# Patient Record
Sex: MALE | Race: WHITE | NOT HISPANIC OR LATINO | Employment: OTHER | URBAN - METROPOLITAN AREA
[De-identification: names, ages, dates, MRNs, and addresses within clinical notes are randomized per-mention and may not be internally consistent; named-entity substitution may affect disease eponyms.]

---

## 2017-06-01 ENCOUNTER — HISTORICAL (OUTPATIENT)
Dept: ADMINISTRATIVE | Facility: HOSPITAL | Age: 57
End: 2017-06-01

## 2017-06-05 ENCOUNTER — TELEPHONE (OUTPATIENT)
Dept: UROLOGY | Facility: CLINIC | Age: 57
End: 2017-06-05

## 2017-06-05 DIAGNOSIS — R97.20 ELEVATED PSA, LESS THAN 10 NG/ML: ICD-10-CM

## 2017-06-05 DIAGNOSIS — R31.0 GROSS HEMATURIA: Primary | ICD-10-CM

## 2017-06-05 NOTE — TELEPHONE ENCOUNTER
Spoke with patient assisted with scheduling follow up appointments , date time and locations confirmed for all appointments. Informed appointment slips would be mailed out as an reminder. Verbalized understanding

## 2017-06-07 ENCOUNTER — CLINICAL SUPPORT (OUTPATIENT)
Dept: UROLOGY | Facility: CLINIC | Age: 57
End: 2017-06-07
Payer: MEDICARE

## 2017-06-07 ENCOUNTER — LAB VISIT (OUTPATIENT)
Dept: LAB | Facility: HOSPITAL | Age: 57
End: 2017-06-07
Attending: UROLOGY
Payer: MEDICARE

## 2017-06-07 DIAGNOSIS — R31.0 GROSS HEMATURIA: ICD-10-CM

## 2017-06-07 LAB
ANION GAP SERPL CALC-SCNC: 13 MMOL/L
BUN SERPL-MCNC: 10 MG/DL
CALCIUM SERPL-MCNC: 9.8 MG/DL
CHLORIDE SERPL-SCNC: 99 MMOL/L
CO2 SERPL-SCNC: 24 MMOL/L
CREAT SERPL-MCNC: 1 MG/DL
EST. GFR  (AFRICAN AMERICAN): >60 ML/MIN/1.73 M^2
EST. GFR  (NON AFRICAN AMERICAN): >60 ML/MIN/1.73 M^2
GLUCOSE SERPL-MCNC: 88 MG/DL
POTASSIUM SERPL-SCNC: 3.5 MMOL/L
SODIUM SERPL-SCNC: 136 MMOL/L

## 2017-06-07 PROCEDURE — 36415 COLL VENOUS BLD VENIPUNCTURE: CPT

## 2017-06-07 PROCEDURE — 80048 BASIC METABOLIC PNL TOTAL CA: CPT

## 2017-06-07 NOTE — PROGRESS NOTES
Pt arrived to clinic to have barker cath removed. 16FR barker cath with 10cc balloon intacked and d/c'd with no problem. Pt tolerated procedure well. Pt advised to drink plenty of fluid to help urinate. If by 2pm pt has not urinated pt instructed to come back to clinic to get another barker cath.

## 2017-06-08 ENCOUNTER — LAB VISIT (OUTPATIENT)
Dept: LAB | Facility: HOSPITAL | Age: 57
End: 2017-06-08
Attending: UROLOGY
Payer: MEDICARE

## 2017-06-08 ENCOUNTER — CLINICAL SUPPORT (OUTPATIENT)
Dept: UROLOGY | Facility: CLINIC | Age: 57
End: 2017-06-08
Payer: MEDICARE

## 2017-06-08 ENCOUNTER — TELEPHONE (OUTPATIENT)
Dept: UROLOGY | Facility: CLINIC | Age: 57
End: 2017-06-08

## 2017-06-08 DIAGNOSIS — R31.0 GROSS HEMATURIA: ICD-10-CM

## 2017-06-08 DIAGNOSIS — R97.20 ELEVATED PSA, LESS THAN 10 NG/ML: ICD-10-CM

## 2017-06-08 DIAGNOSIS — E29.1 HYPOGONADISM IN MALE: ICD-10-CM

## 2017-06-08 DIAGNOSIS — R97.20 ELEVATED PSA, LESS THAN 10 NG/ML: Primary | ICD-10-CM

## 2017-06-08 DIAGNOSIS — R31.0 GROSS HEMATURIA: Primary | ICD-10-CM

## 2017-06-08 DIAGNOSIS — N40.0 BENIGN PROSTATIC HYPERPLASIA, PRESENCE OF LOWER URINARY TRACT SYMPTOMS UNSPECIFIED, UNSPECIFIED MORPHOLOGY: ICD-10-CM

## 2017-06-08 LAB
PROSTATE SPECIFIC ANTIGEN, TOTAL: 9.6 NG/ML
PSA FREE MFR SERPL: 34.69 %
PSA FREE SERPL-MCNC: 3.33 NG/ML

## 2017-06-08 PROCEDURE — 51700 IRRIGATION OF BLADDER: CPT | Mod: S$PBB,,, | Performed by: UROLOGY

## 2017-06-08 PROCEDURE — 99212 OFFICE O/P EST SF 10 MIN: CPT | Mod: PBBFAC,PO

## 2017-06-08 PROCEDURE — 88112 CYTOPATH CELL ENHANCE TECH: CPT | Performed by: PATHOLOGY

## 2017-06-08 PROCEDURE — 88112 CYTOPATH CELL ENHANCE TECH: CPT | Mod: 26,,, | Performed by: PATHOLOGY

## 2017-06-08 PROCEDURE — 87086 URINE CULTURE/COLONY COUNT: CPT

## 2017-06-08 PROCEDURE — 84153 ASSAY OF PSA TOTAL: CPT

## 2017-06-08 PROCEDURE — 99999 PR PBB SHADOW E&M-EST. PATIENT-LVL II: CPT | Mod: PBBFAC,,,

## 2017-06-08 PROCEDURE — 99215 OFFICE O/P EST HI 40 MIN: CPT | Mod: S$PBB,25,, | Performed by: UROLOGY

## 2017-06-08 PROCEDURE — 51700 IRRIGATION OF BLADDER: CPT | Mod: PBBFAC,PO | Performed by: UROLOGY

## 2017-06-08 PROCEDURE — 36415 COLL VENOUS BLD VENIPUNCTURE: CPT

## 2017-06-08 PROCEDURE — 96372 THER/PROPH/DIAG INJ SC/IM: CPT | Mod: PBBFAC,PO

## 2017-06-08 RX ORDER — FINASTERIDE 5 MG/1
5 TABLET, FILM COATED ORAL DAILY
Qty: 30 TABLET | Refills: 11 | Status: SHIPPED | OUTPATIENT
Start: 2017-06-08 | End: 2018-01-24

## 2017-06-08 RX ORDER — LIDOCAINE HYDROCHLORIDE 20 MG/ML
JELLY TOPICAL
Status: CANCELLED | OUTPATIENT
Start: 2017-06-08 | End: 2017-06-08

## 2017-06-08 RX ORDER — LIDOCAINE HYDROCHLORIDE 20 MG/ML
JELLY TOPICAL ONCE
Status: CANCELLED | OUTPATIENT
Start: 2017-06-08 | End: 2017-06-08

## 2017-06-08 RX ORDER — TAMSULOSIN HYDROCHLORIDE 0.4 MG/1
0.4 CAPSULE ORAL DAILY
Qty: 30 CAPSULE | Refills: 11 | Status: SHIPPED | OUTPATIENT
Start: 2017-06-08 | End: 2017-09-06

## 2017-06-08 RX ORDER — GENTAMICIN SULFATE 40 MG/ML
160 INJECTION, SOLUTION INTRAMUSCULAR; INTRAVENOUS ONCE
Status: COMPLETED | OUTPATIENT
Start: 2017-06-08 | End: 2017-06-08

## 2017-06-08 RX ADMIN — GENTAMICIN SULFATE 160 MG: 40 INJECTION, SOLUTION INTRAMUSCULAR; INTRAVENOUS at 04:06

## 2017-06-08 NOTE — PROGRESS NOTES
Pt arrived to clinic. Pt sent to have PSA done at lab. Pt returned to clinic. Dr Mcconnell to put 20FR coude cath with 30cc balloon inserted. Dr Mcconnell to irrigate bladder continuously until urine went from dark, red blood to pink. Leg bag attached and taped to side of leg for gravity. Pt given more water to drink to check out put.

## 2017-06-08 NOTE — PROGRESS NOTES
"[facility] Urology Progress Note    Mo Escobar is a 57 y.o. male who presents for gross hematuria follow up.     I saw him as inpt consult at [facility] on 5/29/17 where he had just completed CBI for gross hematuria which has been a recurrent problem given his history of BPH and clotting disorder with DVTs/PEs on chronic anticoagulation.   Previously followed by Dr Humphrey since urinary retention episode in 2014 and had TURP with lovenox bridge at that time. Reports that ever since that TURP in 2014 he has had intermittent GH for a few days at a time.  Longest hematuria free interval in 3 years has been 2 mos. Path reportedly benign at that time.  PSA 10/2015 4.3, 4/2016 3.6. Has not had dedicated prostate biopsy.  4/2016 negative RBUS  5/2016 noncon CT normal except prostatomegaly ([id number]) with sig median lobe (larger med lobe than in 2010)  6/2016 scope by Dr Humphrey as part of GH workup only noted BPH regrowth and was started on finasteride for presumed prostatic bleeding (which he stopped 9/2016 noting a "funny feeling")    2 weeks of GH with clots prompted his [facility] admit late may. Once barker out, retention and needed replaced next day after which I saw him. Recommended restarting flomax (had been off since pre-TURP) and had nurse visit yesterday for barker removal.  Discussed restarting 5ari but only after free total psa after his barker had been out a few week for assessment of his age specific elevated psa. Also recommended fresh GH workup with CT urogram and cystoscopy. With further interventions based on assessment of psa and bladder/urethral findings.   He was found to have nonocclusive R popliteal DVT during his [facility] admit    Resumed anticoagulation (warfarin with lovenox bridge) yesterday. Had recurrence of GH and called today noting frankly bloody urine and has passed some clots.  I advised he come in to clinic for catheter placement and bladder irrigation, and expedite timeline of evaluation.  Does " state GH started to resume last night even before starting anticoagulation. Had otherwise been clear with barker in place.    He also mentioned today he has been on TRT for 6 months, Q2 weeks, with last injection <1 week ago as he was found to have low T on fatigue workup earlier this year.    ROS: A comprehensive 10 system review was performed and is negative except as noted above in HPI    PHYSICAL EXAM:  General: Alert, cooperative, no distress, appears stated age   Head: Normocephalic, without obvious abnormality, atraumatic   Eyes: PERRL, conjunctiva/corneas clear   Lungs: Respirations unlabored   Heart: Warm and well perfused   Abdomen: soft NT ND  : uncirc normal phallus, normal meatus, bilat desc testes without mass.  Extremities: Extremities normal, atraumatic, no cyanosis or edema   Skin: Skin color, texture, turgor normal, no rashes or lesions   Psych: Appropriate   Neurologic: Non-focal     Catheterization/Bladder irrigation  Given his persistent hematuria with clots, proceeded with irrigation of his bladder after verbal consent.  Using aseptic technique, a 20fr coude barker catheter placed into bladder. 350cc grossly bloody urine drained, no clots but had appearance of raghu blood.  60 cc cath tip syringe filled with sterile water used to irrigate bladder. 60cc instilled, then next 60cc used to flush and irrigate until return of clear to light pink urine seen after approximately 1.5 liters of manual irrigation, during which the 30cc balloon was overdistended with 40cc and catheter held on traction while irrigating to tamponade presumed prostatic bleeding, after which urine cleared. Barker taped to traction on thigh, irrigated clear again, and left on traction for 1 hour after which 10cc removed from balloon and untaped from traction and placed to stat lock and leg bag at which time urine translucent/clear light red. No clots.  Patient tolerated well. He was then given 160mg IM gentamicin. Urine sent for  culture and cytology.        Recent Results (from the past 336 hour(s))   Basic metabolic panel    Collection Time: 06/07/17  8:51 AM   Result Value Ref Range    Sodium 136 136 - 145 mmol/L    Potassium 3.5 3.5 - 5.1 mmol/L    Chloride 99 95 - 110 mmol/L    CO2 24 23 - 29 mmol/L    Glucose 88 70 - 110 mg/dL    BUN, Bld 10 6 - 20 mg/dL    Creatinine 1.0 0.5 - 1.4 mg/dL    Calcium 9.8 8.7 - 10.5 mg/dL    Anion Gap 13 8 - 16 mmol/L    eGFR if African American >60 >60 mL/min/1.73 m^2    eGFR if non African American >60 >60 mL/min/1.73 m^2       ASSESSMENT   1. Gross hematuria  Urine culture    Cytology, urine    finasteride (PROSCAR) 5 mg tablet   2. Elevated PSA, less than 10 ng/ml     3. Benign prostatic hyperplasia, presence of lower urinary tract symptoms unspecified, unspecified morphology  tamsulosin (FLOMAX) 0.4 mg Cp24    finasteride (PROSCAR) 5 mg tablet   4. Hypogonadism in male         Plan    Recommended barker remaining indwelling for prostatic urethral tamponade effect until cystoscopic evaluation, and resuming finasteride to decrease prostatic bleeding. I had him draw his free/total psa prior to catheterization today and prior to finasteride effect. Though may not be accurate given recent barker, was out for > 24 hours and is best window will have to reeval.  His CT urogram has been r/s for tomorrow and cystoscopy scheduled at [facility] 6/13/17.  Did discuss that if urine clear would proceed with diagnostic cysto for evaluation and definitive planning. Have tentatively planned for OR 6/22/17 as that is next prostate laser availability as we discussed if his friable prostate regrowth is indeed source of his hematuria would need TURP and would prefer laser vaporization for hemostasis. If other findings contributory to his hematuria such as bladder source could be managed at that time as well. For now advised he continue his current anticoagulation plan. Will not need to stop bloodthinners for diagnostic flexible  cysto, so will have time to discuss with Maribel Garrido/Swapna prior to any intervention 6/22/17, but advised will most likely require lovenox. Will chart check CT results and check in on Monday regarding status of urine.  Advised if urine becomes frankly bloody again, significant clots, or barker has poor drainage should call back if business hours otherwise present to ER.  Advised he hold off on any TRT pending eval of psa/prostate. Discussed that TRT does not cause prostate cancer, but if was too develop could exacerbate.  Given his BPH and recent retention should continue flomax. If psa elevated or free psa low would consider prostate biopsy but he is high risk given anticoag and hematuria and may need to proceed with turp without biopsy given his significant refractory hematuria if indeed prostate is source  45 mins spent in face to face encounter with patient, over half in counseling, and otherwise in extended manual irrigation of bladder

## 2017-06-08 NOTE — TELEPHONE ENCOUNTER
Pt called to say still bleeding and every once in a while feels a clot pass. Pt just wanted Dr Mcconnell to know. He is drinking plenty of water and is urinating fine. Pt having CT can and Cystoscope next week. Pt can come in to have another barker cath put in until procedure next week.   Pt would prefer not to have cath put back in. Dr Mcconnell stated with his hx he needs the cath. Pt will come in today.

## 2017-06-09 ENCOUNTER — TELEPHONE (OUTPATIENT)
Dept: UROLOGY | Facility: CLINIC | Age: 57
End: 2017-06-09

## 2017-06-09 ENCOUNTER — HOSPITAL ENCOUNTER (OUTPATIENT)
Dept: RADIOLOGY | Facility: HOSPITAL | Age: 57
Discharge: HOME | End: 2017-06-09
Attending: UROLOGY
Payer: MEDICARE

## 2017-06-09 DIAGNOSIS — D68.9 COAGULOPATHY: ICD-10-CM

## 2017-06-09 DIAGNOSIS — D68.9 CLOTTING DISORDER: Primary | ICD-10-CM

## 2017-06-09 DIAGNOSIS — N40.0 BPH (BENIGN PROSTATIC HYPERPLASIA): ICD-10-CM

## 2017-06-09 DIAGNOSIS — N40.0 BENIGN PROSTATIC HYPERPLASIA, PRESENCE OF LOWER URINARY TRACT SYMPTOMS UNSPECIFIED, UNSPECIFIED MORPHOLOGY: Primary | ICD-10-CM

## 2017-06-09 DIAGNOSIS — R31.0 GROSS HEMATURIA: ICD-10-CM

## 2017-06-09 LAB — BACTERIA UR CULT: NO GROWTH

## 2017-06-09 PROCEDURE — 25500020 PHARM REV CODE 255

## 2017-06-09 PROCEDURE — 74178 CT ABD&PLV WO CNTR FLWD CNTR: CPT | Mod: TC

## 2017-06-09 PROCEDURE — 74178 CT ABD&PLV WO CNTR FLWD CNTR: CPT | Mod: 26,,, | Performed by: RADIOLOGY

## 2017-06-09 RX ORDER — ENOXAPARIN SODIUM 100 MG/ML
1 INJECTION SUBCUTANEOUS 2 TIMES DAILY
Qty: 20 SYRINGE | Refills: 1 | Status: SHIPPED | OUTPATIENT
Start: 2017-06-09 | End: 2017-07-31

## 2017-06-09 RX ORDER — CIPROFLOXACIN 500 MG/1
500 TABLET ORAL 2 TIMES DAILY
Qty: 6 TABLET | Refills: 0 | Status: SHIPPED | OUTPATIENT
Start: 2017-06-09 | End: 2017-06-21 | Stop reason: ALTCHOICE

## 2017-06-09 RX ORDER — SODIUM CHLORIDE 9 MG/ML
INJECTION, SOLUTION INTRAVENOUS CONTINUOUS
Status: CANCELLED | OUTPATIENT
Start: 2017-06-09

## 2017-06-09 RX ADMIN — IOHEXOL 100 ML: 350 INJECTION, SOLUTION INTRAVENOUS at 07:06

## 2017-06-09 NOTE — TELEPHONE ENCOUNTER
Spoke with Pt. Pt stated he had a clog again but was able to pinch tube and push air back through to get unclogged. Pt doing fine now. Pt was seen in the ER at 5am this morning for clog.

## 2017-06-09 NOTE — TELEPHONE ENCOUNTER
Spoke with patient.   Has had a few times catheter stopped flowing since ER visit this am but has been able to manipulate it to drain again. Has had sediment, like sand, drain through.  Reviewed CT urogram with him - no stones, otherwise normal except for large prostate.  States his urine is now draining clear.

## 2017-06-09 NOTE — TELEPHONE ENCOUNTER
Call rtn to pt notified that we will have him placed on a Lovenox bridge that will start 5 days prior to his procedure I will review his bridge with him next week and submit a script for Lovenox to his pharmacy. Pt verbalized understanding has been on bridge for procedures in the past. appreciates call.

## 2017-06-09 NOTE — TELEPHONE ENCOUNTER
----- Message from Radha Alejo sent at 6/9/2017  2:46 PM CDT -----  Contact: Patient  Place call to pod, patient called  Regarding catheter clog,need to know what to do? Please call back at [phone]. Thanks,

## 2017-06-09 NOTE — TELEPHONE ENCOUNTER
----- Message from Dion Garrido MD sent at 6/8/2017  8:38 PM CDT -----  He is going to have urological procedure on 6/22 and may just need to stay on lovenox until that time; or he can go back on coumadin and start lovenox bridge 5 days before procedure. I think just staying on lovenox is the better of the two options      ----- Message -----  From: iMke Mcconnell MD  Sent: 6/8/2017   4:40 PM  To: Dion Garrido MD

## 2017-06-12 ENCOUNTER — TELEPHONE (OUTPATIENT)
Dept: HEMATOLOGY/ONCOLOGY | Facility: CLINIC | Age: 57
End: 2017-06-12

## 2017-06-13 ENCOUNTER — HOSPITAL ENCOUNTER (OUTPATIENT)
Facility: AMBULARY SURGERY CENTER | Age: 57
Discharge: HOME | End: 2017-06-13
Attending: UROLOGY | Admitting: UROLOGY
Payer: MEDICARE

## 2017-06-13 ENCOUNTER — SURGERY (OUTPATIENT)
Age: 57
End: 2017-06-13

## 2017-06-13 DIAGNOSIS — R31.0 GROSS HEMATURIA: ICD-10-CM

## 2017-06-13 PROCEDURE — G8907 PT DOC NO EVENTS ON DISCHARG: HCPCS | Performed by: UROLOGY

## 2017-06-13 PROCEDURE — G8918 PT W/O PREOP ORDER IV AB PRO: HCPCS | Performed by: UROLOGY

## 2017-06-13 PROCEDURE — 76942 ECHO GUIDE FOR BIOPSY: CPT | Mod: 26,59,, | Performed by: UROLOGY

## 2017-06-13 PROCEDURE — 51700 IRRIGATION OF BLADDER: CPT | Mod: 59,,, | Performed by: UROLOGY

## 2017-06-13 PROCEDURE — 76872 US TRANSRECTAL: CPT | Mod: 26,,, | Performed by: UROLOGY

## 2017-06-13 PROCEDURE — 52000 CYSTOURETHROSCOPY: CPT | Performed by: UROLOGY

## 2017-06-13 PROCEDURE — 52000 CYSTOURETHROSCOPY: CPT | Mod: 59,,, | Performed by: UROLOGY

## 2017-06-13 PROCEDURE — 52005 CYSTO W/URTRL CATHJ: CPT | Performed by: UROLOGY

## 2017-06-13 PROCEDURE — 88305 TISSUE EXAM BY PATHOLOGIST: CPT | Performed by: PATHOLOGY

## 2017-06-13 PROCEDURE — 88305 TISSUE EXAM BY PATHOLOGIST: CPT | Mod: 26,,, | Performed by: PATHOLOGY

## 2017-06-13 PROCEDURE — 76872 US TRANSRECTAL: CPT | Performed by: UROLOGY

## 2017-06-13 PROCEDURE — 55700 HC PROSTATE NEEDLE BIOPSY: CPT | Performed by: UROLOGY

## 2017-06-13 PROCEDURE — 55700 PR BIOPSY OF PROSTATE,NEEDLE/PUNCH: CPT | Mod: ,,, | Performed by: UROLOGY

## 2017-06-13 RX ORDER — GENTAMICIN SULFATE 40 MG/ML
INJECTION, SOLUTION INTRAMUSCULAR; INTRAVENOUS
Status: DISCONTINUED
Start: 2017-06-13 | End: 2017-06-13 | Stop reason: HOSPADM

## 2017-06-13 RX ORDER — WATER 1 ML/ML
IRRIGANT IRRIGATION
Status: DISCONTINUED | OUTPATIENT
Start: 2017-06-13 | End: 2017-06-13 | Stop reason: HOSPADM

## 2017-06-13 RX ORDER — LIDOCAINE HYDROCHLORIDE 20 MG/ML
JELLY TOPICAL ONCE
Status: COMPLETED | OUTPATIENT
Start: 2017-06-13 | End: 2017-06-13

## 2017-06-13 RX ORDER — GENTAMICIN SULFATE 40 MG/ML
INJECTION, SOLUTION INTRAMUSCULAR; INTRAVENOUS
Status: COMPLETED
Start: 2017-06-13 | End: 2017-06-13

## 2017-06-13 RX ORDER — GENTAMICIN SULFATE 40 MG/ML
160 INJECTION, SOLUTION INTRAMUSCULAR; INTRAVENOUS ONCE
Status: COMPLETED | OUTPATIENT
Start: 2017-06-13 | End: 2017-06-13

## 2017-06-13 RX ORDER — LIDOCAINE HYDROCHLORIDE 10 MG/ML
INJECTION, SOLUTION EPIDURAL; INFILTRATION; INTRACAUDAL; PERINEURAL
Status: DISCONTINUED | OUTPATIENT
Start: 2017-06-13 | End: 2017-06-13 | Stop reason: HOSPADM

## 2017-06-13 RX ORDER — LIDOCAINE HYDROCHLORIDE 20 MG/ML
JELLY TOPICAL
Status: DISCONTINUED
Start: 2017-06-13 | End: 2017-06-13 | Stop reason: HOSPADM

## 2017-06-13 RX ADMIN — LIDOCAINE HYDROCHLORIDE: 20 JELLY TOPICAL at 02:06

## 2017-06-13 RX ADMIN — WATER 500 ML: 1 IRRIGANT IRRIGATION at 02:06

## 2017-06-13 RX ADMIN — LIDOCAINE HYDROCHLORIDE 14 ML: 10 INJECTION, SOLUTION EPIDURAL; INFILTRATION; INTRACAUDAL; PERINEURAL at 03:06

## 2017-06-13 RX ADMIN — GENTAMICIN SULFATE 160 MG: 40 INJECTION, SOLUTION INTRAMUSCULAR; INTRAVENOUS at 01:06

## 2017-06-13 NOTE — OR NURSING
20 Thai catheter removed prior to procedure with 300mls of clear yellow fluid.     Bladder flushed with 120 mls of sterile water after catheter placed per .

## 2017-06-13 NOTE — INTERVAL H&P NOTE
The patient has been examined and the H&P has been reviewed:    In addition to above, as psa 9.3 despite high free psa percentage and recent catheterization, given history of elevated psa discussed proceeding with prostate biopsy in addition to cysto as long as his urine was clear up to this point,  He had been using lovenox, and held it 24 hours ago, completed abx and enema, and urine has been clear yellow per barker for last 2-3 days.    Proceed with biopsy and cysto    Anesthesia/Surgery risks, benefits and alternative options discussed and understood by patient/family.          Active Hospital Problems    Diagnosis  POA    Gross hematuria [R31.0]  Yes      Resolved Hospital Problems    Diagnosis Date Resolved POA   No resolved problems to display.

## 2017-06-13 NOTE — H&P (VIEW-ONLY)
"[facility] Urology Progress Note    Mo Escobar is a 57 y.o. male who presents for gross hematuria follow up.     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   Previously followed by Dr Humphrey since urinary retention episode in 2014 and had TURP with lovenox bridge at that time. Reports that ever since that TURP in 2014 he has had intermittent GH for a few days at a time.  Longest hematuria free interval in 3 years has been 2 mos. Path reportedly benign at that time.  PSA 10/2015 4.3, 4/2016 3.6. Has not had dedicated prostate biopsy.  4/2016 negative RBUS  5/2016 noncon CT normal except prostatomegaly ([id number]) with sig median lobe (larger med lobe than in 2010)  6/2016 scope by Dr Humphrey as part of GH workup only noted BPH regrowth and was started on finasteride for presumed prostatic bleeding (which he stopped 9/2016 noting a "funny feeling")    2 weeks of GH with clots prompted his [facility] admit late may. Once barker out, retention and needed replaced next day after which I saw him. Recommended restarting flomax (had been off since pre-TURP) and had nurse visit yesterday for barker removal.  Discussed restarting 5ari but only after free total psa after his barker had been out a few week for assessment of his age specific elevated psa. Also recommended fresh GH workup with CT urogram and cystoscopy. With further interventions based on assessment of psa and bladder/urethral findings.   He was found to have nonocclusive R popliteal DVT during his [facility] admit    Resumed anticoagulation (warfarin with lovenox bridge) yesterday. Had recurrence of GH and called today noting frankly bloody urine and has passed some clots.  I advised he come in to clinic for catheter placement and bladder irrigation, and expedite timeline of evaluation.  Does " state GH started to resume last night even before starting anticoagulation. Had otherwise been clear with barker in place.    He also mentioned today he has been on TRT for 6 months, Q2 weeks, with last injection <1 week ago as he was found to have low T on fatigue workup earlier this year.    ROS: A comprehensive 10 system review was performed and is negative except as noted above in HPI    PHYSICAL EXAM:  General: Alert, cooperative, no distress, appears stated age   Head: Normocephalic, without obvious abnormality, atraumatic   Eyes: PERRL, conjunctiva/corneas clear   Lungs: Respirations unlabored   Heart: Warm and well perfused   Abdomen: soft NT ND  : uncirc normal phallus, normal meatus, bilat desc testes without mass.  Extremities: Extremities normal, atraumatic, no cyanosis or edema   Skin: Skin color, texture, turgor normal, no rashes or lesions   Psych: Appropriate   Neurologic: Non-focal     Catheterization/Bladder irrigation  Given his persistent hematuria with clots, proceeded with irrigation of his bladder after verbal consent.  Using aseptic technique, a 20fr coude barker catheter placed into bladder. 350cc grossly bloody urine drained, no clots but had appearance of raghu blood.  60 cc cath tip syringe filled with sterile water used to irrigate bladder. 60cc instilled, then next 60cc used to flush and irrigate until return of clear to light pink urine seen after approximately 1.5 liters of manual irrigation, during which the 30cc balloon was overdistended with 40cc and catheter held on traction while irrigating to tamponade presumed prostatic bleeding, after which urine cleared. Barker taped to traction on thigh, irrigated clear again, and left on traction for 1 hour after which 10cc removed from balloon and untaped from traction and placed to stat lock and leg bag at which time urine translucent/clear light red. No clots.  Patient tolerated well. He was then given 160mg IM gentamicin. Urine sent for  culture and cytology.        Recent Results (from the past 336 hour(s))   Basic metabolic panel    Collection Time: 06/07/17  8:51 AM   Result Value Ref Range    Sodium 136 136 - 145 mmol/L    Potassium 3.5 3.5 - 5.1 mmol/L    Chloride 99 95 - 110 mmol/L    CO2 24 23 - 29 mmol/L    Glucose 88 70 - 110 mg/dL    BUN, Bld 10 6 - 20 mg/dL    Creatinine 1.0 0.5 - 1.4 mg/dL    Calcium 9.8 8.7 - 10.5 mg/dL    Anion Gap 13 8 - 16 mmol/L    eGFR if African American >60 >60 mL/min/1.73 m^2    eGFR if non African American >60 >60 mL/min/1.73 m^2       ASSESSMENT   1. Gross hematuria  Urine culture    Cytology, urine    finasteride (PROSCAR) 5 mg tablet   2. Elevated PSA, less than 10 ng/ml     3. Benign prostatic hyperplasia, presence of lower urinary tract symptoms unspecified, unspecified morphology  tamsulosin (FLOMAX) 0.4 mg Cp24    finasteride (PROSCAR) 5 mg tablet   4. Hypogonadism in male         Plan    Recommended barker remaining indwelling for prostatic urethral tamponade effect until cystoscopic evaluation, and resuming finasteride to decrease prostatic bleeding. I had him draw his free/total psa prior to catheterization today and prior to finasteride effect. Though may not be accurate given recent barker, was out for > 24 hours and is best window will have to reeval.  His CT urogram has been r/s for tomorrow and cystoscopy scheduled at [facility] 6/13/17.  Did discuss that if urine clear would proceed with diagnostic cysto for evaluation and definitive planning. Have tentatively planned for OR 6/22/17 as that is next prostate laser availability as we discussed if his friable prostate regrowth is indeed source of his hematuria would need TURP and would prefer laser vaporization for hemostasis. If other findings contributory to his hematuria such as bladder source could be managed at that time as well. For now advised he continue his current anticoagulation plan. Will not need to stop bloodthinners for diagnostic flexible  cysto, so will have time to discuss with Maribel Garrido/Swapna prior to any intervention 6/22/17, but advised will most likely require lovenox. Will chart check CT results and check in on Monday regarding status of urine.  Advised if urine becomes frankly bloody again, significant clots, or barker has poor drainage should call back if business hours otherwise present to ER.  Advised he hold off on any TRT pending eval of psa/prostate. Discussed that TRT does not cause prostate cancer, but if was too develop could exacerbate.  Given his BPH and recent retention should continue flomax. If psa elevated or free psa low would consider prostate biopsy but he is high risk given anticoag and hematuria and may need to proceed with turp without biopsy given his significant refractory hematuria if indeed prostate is source  45 mins spent in face to face encounter with patient, over half in counseling, and otherwise in extended manual irrigation of bladder

## 2017-06-13 NOTE — OP NOTE
[facility] Urology Operative/Brief Discharge Note     Date: 06/13/2017     Staff Surgeon: Mike Mcconnell MD     Pre-Op Diagnosis:   1. gross hematuria  2. urinary retention  3. elevated psa   4.BPH (s/p TURP in 2014)     Post-Op Diagnosis: same     Procedure(s) Performed:   1. Cystoscopy, flexible  2. Transrectal ultrasound guided prostate needle biopsy  3. Change of barker catheter, complex (with manual irrigation of bladder)     Specimen(s): 12 Prostate Biopsy Cores: right and left base, apex, and mid - medial and lateral of each     Anesthesia: Local: 2% xylocaine jelly per urethra (urojet), 1% lidocaine periprostatic block     Findings: 92g prostate by ultrasound with cystoscopic evidence of outlet obstruction with significant regrowth of lateral lobes and very large significant abnormal hypervascular edematous median lobe with calcifications     Estimated Blood Loss: none     Drains: 20fr coude barker with 20cc in balloon     Complications: none      Indications for procedure: Mr Young is a 56 yo M with hx BPH s/p TURP in 2014 but persistent gross hematuria intermittently since, worsening lately as he is chronically anticoagulated for clotting disorder. Recent admit to [facility] cleared GH on CBI but unable to void and had barker replaced. After removal had recurrence of raghu hematuria within one day necessitating barker and irrigation. PSA has also been historically elevated for his age, and planning TURP/laser vaporization to resolve retention and GH as prostate previously found to be source of bleeding.  Has been on lovenox bridge with clear urine and here today for cystoscopic evaluation of his GH and prostate biopsy. CT urogram negative except for prostatomegaly.     Procedure in detail:  After informed consent, the patient was first placed in upine position and prepped and drapped in standard cystoscopic fashion and 2% xylocaine jelly was instilled into the urethra after removing his indwelling barker. A flexible  cystoscope was passed into the bladder via the urethra.      Anterior urethra appeared normal without any lesions or narrowings. The prostatic urethra demonstrated significant regrowth of bilateral lateral lobes with kissing lateral lobes and evidence of outlet obstruction. Long prostatic urethra >7cm. Bladderder systematically inspected and no mucosal lesions or tumors seen besides one small focus of edema on posterior trigone consistent with barker edema. Bladder otherwise normal with grade 1 trabeculations.       Bilateral ureteral orifices seen in orthotopic position on trigone bilaterally with clear efflux but only on retroflexion. On retroflexion, could also see significant obstructing prostate tissue surrounding scope obstructing outlet. Median lobe appeared very irregular, edematous, hypervascular, erythematous and has multiple small calcifications.    Cystoscope was removed and He was then placed in left lateral position. Unable to void between procedures       US probe inserted into rectum and prostate visualized on screen. Very large prostate with scattered hypoechoic areas noted. Measurements taken with total volume of 92cc (W 59.4, H 46.1, L 64.2). On saggital view, was noted to have moderate intravesical extension.      Approximately 15cc of 1% lidocaine injected bilaterally in cristina-prostatic block fashion, as well as at the apex. 12 core  biopies taken in a sextant fashion from right base to apex then left base to apex, medial and lateral cores at each as above.    After holding rectal pressure for 5 minutes, he was returned to supine position and 20fr coude barker placed using aseptic technique, which drained about 200cc of clear yellow urine+irrigation fluid. No significant blood or blood clots. To ensure no looming blood clots in bladder, 60cc cath tip syringe used to fill bladder with 60cc sterile water, and then syringe used to flush/irrigate with subsequent syringefuls. No clots. Barker placed to  gravity drainage and stat lock on patient's right thigh, draining clear.     Disposition: Home. Will hold lovenox for 48 hours then resume. Tentative plans for TURP/TULVP next week 6/22/17 pending pathology.    Discharge:  Status post uncomplicated outpatient procedure as above.   Disposition: Home  No new meds - continue antibiotics as prescribed  Resume regular diet  Hold lovenox until 6/15/17  Return to ER if temp >101, uncontrollable urethral or rectal bleeding, or inability to urinate/urinary retention/blocked catheter

## 2017-06-14 VITALS
BODY MASS INDEX: 40.02 KG/M2 | HEIGHT: 72 IN | WEIGHT: 295.44 LBS | SYSTOLIC BLOOD PRESSURE: 127 MMHG | TEMPERATURE: 99 F | HEART RATE: 68 BPM | DIASTOLIC BLOOD PRESSURE: 63 MMHG | RESPIRATION RATE: 18 BRPM | OXYGEN SATURATION: 97 %

## 2017-06-15 ENCOUNTER — TELEPHONE (OUTPATIENT)
Dept: UROLOGY | Facility: HOSPITAL | Age: 57
End: 2017-06-15

## 2017-06-15 ENCOUNTER — TELEPHONE (OUTPATIENT)
Dept: HEMATOLOGY/ONCOLOGY | Facility: CLINIC | Age: 57
End: 2017-06-15

## 2017-06-15 NOTE — TELEPHONE ENCOUNTER
Discussed prostate biopsy pathology with patient, as all cores negative for malignancy. Appreciate pathology expediting results.     His urine was clear after until resuming his Lovenox shots today and notes his urine is now just slightly pink.  Will proceed as planned 1 week from today with transurethral resection and laser vaporization of his prostate.     Will continue full dose lovenox until that time and hold coumadin, and stop lovenox 24 hours prior to procedure. Dr Garrido aware.

## 2017-06-19 ENCOUNTER — TELEPHONE (OUTPATIENT)
Dept: HEMATOLOGY/ONCOLOGY | Facility: CLINIC | Age: 57
End: 2017-06-19

## 2017-06-19 NOTE — TELEPHONE ENCOUNTER
Pt notified to follow instructions given per urologist Dr. Mcconnell for Lovenox. Understanding verbalized. Lovenox bridge paperwork mailed prior to biopsy decision and e-mail notification. Dr. Prieto is aware and has been in contact with Dr. Cowan regarding up coming procedure.  Pt has a f/u after procedure 6/27/17

## 2017-06-19 NOTE — TELEPHONE ENCOUNTER
Spoke with pt states he has medication and will start bridge according to instruction surgical procedure planned for later date with Dr Mcconnell's office. Notified to call office with any questions or concerns

## 2017-06-19 NOTE — TELEPHONE ENCOUNTER
----- Message from Renate Oseguera sent at 6/19/2017  2:39 PM CDT -----  Patient is on Lovenox paper he received in the mail says to do the shots say 1x a day. However was told to do it 2x a day. Please Call patient and Clarify @ [phone]. Thanks

## 2017-06-21 ENCOUNTER — HOSPITAL ENCOUNTER (OUTPATIENT)
Dept: RADIOLOGY | Facility: HOSPITAL | Age: 57
Discharge: HOME | End: 2017-06-21
Attending: UROLOGY
Payer: MEDICARE

## 2017-06-21 ENCOUNTER — ANESTHESIA EVENT (OUTPATIENT)
Dept: SURGERY | Facility: HOSPITAL | Age: 57
End: 2017-06-21
Payer: MEDICARE

## 2017-06-21 ENCOUNTER — HOSPITAL ENCOUNTER (OUTPATIENT)
Dept: PREADMISSION TESTING | Facility: HOSPITAL | Age: 57
Discharge: HOME | End: 2017-06-21
Attending: UROLOGY
Payer: MEDICARE

## 2017-06-21 VITALS — WEIGHT: 295 LBS | HEIGHT: 72 IN | BODY MASS INDEX: 39.96 KG/M2

## 2017-06-21 DIAGNOSIS — Z01.810 PREOP CARDIOVASCULAR EXAM: ICD-10-CM

## 2017-06-21 DIAGNOSIS — D68.9 COAGULOPATHY: ICD-10-CM

## 2017-06-21 DIAGNOSIS — N40.0 BENIGN PROSTATIC HYPERPLASIA, PRESENCE OF LOWER URINARY TRACT SYMPTOMS UNSPECIFIED, UNSPECIFIED MORPHOLOGY: ICD-10-CM

## 2017-06-21 LAB
ABO + RH BLD: NORMAL
ANION GAP SERPL CALC-SCNC: 11 MMOL/L
BASOPHILS # BLD AUTO: 0 K/UL
BASOPHILS NFR BLD: 0.6 %
BLD GP AB SCN CELLS X3 SERPL QL: NORMAL
BUN SERPL-MCNC: 13 MG/DL
CALCIUM SERPL-MCNC: 8.7 MG/DL
CHLORIDE SERPL-SCNC: 103 MMOL/L
CO2 SERPL-SCNC: 22 MMOL/L
CREAT SERPL-MCNC: 1 MG/DL
DIFFERENTIAL METHOD: ABNORMAL
EOSINOPHIL # BLD AUTO: 0.3 K/UL
EOSINOPHIL NFR BLD: 5.3 %
ERYTHROCYTE [DISTWIDTH] IN BLOOD BY AUTOMATED COUNT: 16.2 %
EST. GFR  (AFRICAN AMERICAN): >60 ML/MIN/1.73 M^2
EST. GFR  (NON AFRICAN AMERICAN): >60 ML/MIN/1.73 M^2
GLUCOSE SERPL-MCNC: 118 MG/DL
HCT VFR BLD AUTO: 33.7 %
HGB BLD-MCNC: 11.1 G/DL
INR PPP: 0.9
LYMPHOCYTES # BLD AUTO: 1.6 K/UL
LYMPHOCYTES NFR BLD: 26.1 %
MCH RBC QN AUTO: 24.5 PG
MCHC RBC AUTO-ENTMCNC: 33 %
MCV RBC AUTO: 74 FL
MONOCYTES # BLD AUTO: 0.4 K/UL
MONOCYTES NFR BLD: 5.7 %
NEUTROPHILS # BLD AUTO: 3.8 K/UL
NEUTROPHILS NFR BLD: 62.3 %
PLATELET # BLD AUTO: 323 K/UL
PMV BLD AUTO: 8.4 FL
POTASSIUM SERPL-SCNC: 3.8 MMOL/L
PROTHROMBIN TIME: 9.9 SEC
RBC # BLD AUTO: 4.55 M/UL
SODIUM SERPL-SCNC: 136 MMOL/L
WBC # BLD AUTO: 6.1 K/UL

## 2017-06-21 PROCEDURE — 86920 COMPATIBILITY TEST SPIN: CPT

## 2017-06-21 PROCEDURE — 86901 BLOOD TYPING SEROLOGIC RH(D): CPT

## 2017-06-21 PROCEDURE — 93005 ELECTROCARDIOGRAM TRACING: CPT

## 2017-06-21 PROCEDURE — 93010 ELECTROCARDIOGRAM REPORT: CPT | Mod: ,,, | Performed by: INTERNAL MEDICINE

## 2017-06-21 PROCEDURE — 80048 BASIC METABOLIC PNL TOTAL CA: CPT

## 2017-06-21 PROCEDURE — 85025 COMPLETE CBC W/AUTO DIFF WBC: CPT

## 2017-06-21 PROCEDURE — 99900104 DSU ONLY-NO CHARGE-EA ADD'L HR (STAT)

## 2017-06-21 PROCEDURE — 85610 PROTHROMBIN TIME: CPT

## 2017-06-21 PROCEDURE — 99900103 DSU ONLY-NO CHARGE-INITIAL HR (STAT)

## 2017-06-21 PROCEDURE — 71020 XR CHEST PA AND LATERAL: CPT | Mod: TC

## 2017-06-21 PROCEDURE — 71020 XR CHEST PA AND LATERAL: CPT | Mod: 26,,, | Performed by: RADIOLOGY

## 2017-06-21 PROCEDURE — 36415 COLL VENOUS BLD VENIPUNCTURE: CPT

## 2017-06-21 PROCEDURE — 86900 BLOOD TYPING SEROLOGIC ABO: CPT

## 2017-06-22 ENCOUNTER — HOSPITAL ENCOUNTER (OUTPATIENT)
Facility: HOSPITAL | Age: 57
Discharge: HOME | End: 2017-06-22
Attending: UROLOGY | Admitting: UROLOGY
Payer: MEDICARE

## 2017-06-22 ENCOUNTER — TELEPHONE (OUTPATIENT)
Dept: HEMATOLOGY/ONCOLOGY | Facility: CLINIC | Age: 57
End: 2017-06-22

## 2017-06-22 ENCOUNTER — ANESTHESIA (OUTPATIENT)
Dept: SURGERY | Facility: HOSPITAL | Age: 57
End: 2017-06-22
Payer: MEDICARE

## 2017-06-22 VITALS
SYSTOLIC BLOOD PRESSURE: 134 MMHG | WEIGHT: 295 LBS | BODY MASS INDEX: 39.96 KG/M2 | HEIGHT: 72 IN | RESPIRATION RATE: 18 BRPM | HEART RATE: 72 BPM | OXYGEN SATURATION: 100 % | TEMPERATURE: 98 F | DIASTOLIC BLOOD PRESSURE: 62 MMHG

## 2017-06-22 DIAGNOSIS — N40.0 BPH (BENIGN PROSTATIC HYPERPLASIA): ICD-10-CM

## 2017-06-22 DIAGNOSIS — R31.0 GROSS HEMATURIA: Primary | ICD-10-CM

## 2017-06-22 PROCEDURE — 99900104 DSU ONLY-NO CHARGE-EA ADD'L HR (STAT): Performed by: UROLOGY

## 2017-06-22 PROCEDURE — 63600175 PHARM REV CODE 636 W HCPCS: Performed by: ANESTHESIOLOGY

## 2017-06-22 PROCEDURE — 25000003 PHARM REV CODE 250: Performed by: ANESTHESIOLOGY

## 2017-06-22 PROCEDURE — 71000016 HC POSTOP RECOV ADDL HR: Performed by: UROLOGY

## 2017-06-22 PROCEDURE — 71000015 HC POSTOP RECOV 1ST HR: Performed by: UROLOGY

## 2017-06-22 PROCEDURE — 71000039 HC RECOVERY, EACH ADD'L HOUR: Performed by: UROLOGY

## 2017-06-22 PROCEDURE — 27201423 OPTIME MED/SURG SUP & DEVICES STERILE SUPPLY: Performed by: UROLOGY

## 2017-06-22 PROCEDURE — 36000706: Performed by: UROLOGY

## 2017-06-22 PROCEDURE — 36000707: Performed by: UROLOGY

## 2017-06-22 PROCEDURE — 71000033 HC RECOVERY, INTIAL HOUR: Performed by: UROLOGY

## 2017-06-22 PROCEDURE — 99900103 DSU ONLY-NO CHARGE-INITIAL HR (STAT): Performed by: UROLOGY

## 2017-06-22 PROCEDURE — D9220A PRA ANESTHESIA: Mod: CRNA,,,

## 2017-06-22 PROCEDURE — 63600175 PHARM REV CODE 636 W HCPCS: Performed by: UROLOGY

## 2017-06-22 PROCEDURE — 37000008 HC ANESTHESIA 1ST 15 MINUTES: Performed by: UROLOGY

## 2017-06-22 PROCEDURE — D9220A PRA ANESTHESIA: Mod: ANES,,, | Performed by: ANESTHESIOLOGY

## 2017-06-22 PROCEDURE — 37000009 HC ANESTHESIA EA ADD 15 MINS: Performed by: UROLOGY

## 2017-06-22 PROCEDURE — 25000003 PHARM REV CODE 250: Performed by: NURSE ANESTHETIST, CERTIFIED REGISTERED

## 2017-06-22 PROCEDURE — 88305 TISSUE EXAM BY PATHOLOGIST: CPT | Performed by: PATHOLOGY

## 2017-06-22 PROCEDURE — 63600175 PHARM REV CODE 636 W HCPCS: Performed by: NURSE ANESTHETIST, CERTIFIED REGISTERED

## 2017-06-22 PROCEDURE — 25000003 PHARM REV CODE 250: Performed by: UROLOGY

## 2017-06-22 RX ORDER — TRAMADOL HYDROCHLORIDE 50 MG/1
50 TABLET ORAL EVERY 6 HOURS PRN
Qty: 20 TABLET | Refills: 0 | Status: SHIPPED | OUTPATIENT
Start: 2017-06-22 | End: 2017-07-02

## 2017-06-22 RX ORDER — SODIUM CHLORIDE, SODIUM LACTATE, POTASSIUM CHLORIDE, CALCIUM CHLORIDE 600; 310; 30; 20 MG/100ML; MG/100ML; MG/100ML; MG/100ML
INJECTION, SOLUTION INTRAVENOUS CONTINUOUS
Status: DISCONTINUED | OUTPATIENT
Start: 2017-06-22 | End: 2017-06-22 | Stop reason: HOSPADM

## 2017-06-22 RX ORDER — ROCURONIUM BROMIDE 10 MG/ML
INJECTION, SOLUTION INTRAVENOUS
Status: DISCONTINUED | OUTPATIENT
Start: 2017-06-22 | End: 2017-06-22

## 2017-06-22 RX ORDER — FENTANYL CITRATE 50 UG/ML
25 INJECTION, SOLUTION INTRAMUSCULAR; INTRAVENOUS EVERY 5 MIN PRN
Status: DISCONTINUED | OUTPATIENT
Start: 2017-06-22 | End: 2017-06-22 | Stop reason: HOSPADM

## 2017-06-22 RX ORDER — MIDAZOLAM HYDROCHLORIDE 1 MG/ML
INJECTION, SOLUTION INTRAMUSCULAR; INTRAVENOUS
Status: DISCONTINUED | OUTPATIENT
Start: 2017-06-22 | End: 2017-06-22

## 2017-06-22 RX ORDER — SODIUM CHLORIDE 0.9 % (FLUSH) 0.9 %
3 SYRINGE (ML) INJECTION
Status: DISCONTINUED | OUTPATIENT
Start: 2017-06-22 | End: 2017-06-22 | Stop reason: HOSPADM

## 2017-06-22 RX ORDER — LIDOCAINE HCL/PF 100 MG/5ML
SYRINGE (ML) INTRAVENOUS
Status: DISCONTINUED | OUTPATIENT
Start: 2017-06-22 | End: 2017-06-22

## 2017-06-22 RX ORDER — SODIUM CHLORIDE 9 MG/ML
INJECTION, SOLUTION INTRAVENOUS CONTINUOUS
Status: DISCONTINUED | OUTPATIENT
Start: 2017-06-22 | End: 2017-06-22 | Stop reason: HOSPADM

## 2017-06-22 RX ORDER — ONDANSETRON 2 MG/ML
4 INJECTION INTRAMUSCULAR; INTRAVENOUS DAILY PRN
Status: DISCONTINUED | OUTPATIENT
Start: 2017-06-22 | End: 2017-06-22 | Stop reason: HOSPADM

## 2017-06-22 RX ORDER — ONDANSETRON 2 MG/ML
INJECTION INTRAMUSCULAR; INTRAVENOUS
Status: DISCONTINUED | OUTPATIENT
Start: 2017-06-22 | End: 2017-06-22

## 2017-06-22 RX ORDER — ONDANSETRON 2 MG/ML
4 INJECTION INTRAMUSCULAR; INTRAVENOUS ONCE
Status: DISCONTINUED | OUTPATIENT
Start: 2017-06-22 | End: 2017-06-22 | Stop reason: HOSPADM

## 2017-06-22 RX ORDER — ATROPA BELLADONNA AND OPIUM 16.2; 3 MG/1; MG/1
30 SUPPOSITORY RECTAL ONCE
Status: COMPLETED | OUTPATIENT
Start: 2017-06-22 | End: 2017-06-22

## 2017-06-22 RX ORDER — KETOROLAC TROMETHAMINE 30 MG/ML
30 INJECTION, SOLUTION INTRAMUSCULAR; INTRAVENOUS ONCE
Status: COMPLETED | OUTPATIENT
Start: 2017-06-22 | End: 2017-06-22

## 2017-06-22 RX ORDER — FENTANYL CITRATE 50 UG/ML
INJECTION, SOLUTION INTRAMUSCULAR; INTRAVENOUS
Status: DISCONTINUED | OUTPATIENT
Start: 2017-06-22 | End: 2017-06-22

## 2017-06-22 RX ORDER — OXYCODONE HYDROCHLORIDE 5 MG/1
5 TABLET ORAL EVERY 30 MIN PRN
Status: DISCONTINUED | OUTPATIENT
Start: 2017-06-22 | End: 2017-06-22 | Stop reason: HOSPADM

## 2017-06-22 RX ORDER — GENTAMICIN SULFATE 80 MG/100ML
80 INJECTION, SOLUTION INTRAVENOUS
Status: COMPLETED | OUTPATIENT
Start: 2017-06-22 | End: 2017-06-22

## 2017-06-22 RX ORDER — CEFAZOLIN SODIUM 2 G/50ML
2 SOLUTION INTRAVENOUS
Status: COMPLETED | OUTPATIENT
Start: 2017-06-22 | End: 2017-06-22

## 2017-06-22 RX ORDER — SULFAMETHOXAZOLE AND TRIMETHOPRIM 800; 160 MG/1; MG/1
1 TABLET ORAL 2 TIMES DAILY
Qty: 10 TABLET | Refills: 0 | Status: SHIPPED | OUTPATIENT
Start: 2017-06-22 | End: 2017-06-27

## 2017-06-22 RX ORDER — FENTANYL CITRATE 50 UG/ML
25 INJECTION, SOLUTION INTRAMUSCULAR; INTRAVENOUS EVERY 5 MIN PRN
Status: COMPLETED | OUTPATIENT
Start: 2017-06-22 | End: 2017-06-22

## 2017-06-22 RX ORDER — DEXAMETHASONE SODIUM PHOSPHATE 4 MG/ML
INJECTION, SOLUTION INTRA-ARTICULAR; INTRALESIONAL; INTRAMUSCULAR; INTRAVENOUS; SOFT TISSUE
Status: DISCONTINUED | OUTPATIENT
Start: 2017-06-22 | End: 2017-06-22

## 2017-06-22 RX ORDER — PROPOFOL 10 MG/ML
VIAL (ML) INTRAVENOUS
Status: DISCONTINUED | OUTPATIENT
Start: 2017-06-22 | End: 2017-06-22

## 2017-06-22 RX ORDER — LIDOCAINE HYDROCHLORIDE 20 MG/ML
JELLY TOPICAL
Qty: 5 ML | Refills: 0 | Status: SHIPPED | OUTPATIENT
Start: 2017-06-22 | End: 2017-10-30

## 2017-06-22 RX ORDER — LIDOCAINE HYDROCHLORIDE 20 MG/ML
JELLY TOPICAL ONCE
Status: COMPLETED | OUTPATIENT
Start: 2017-06-22 | End: 2017-06-22

## 2017-06-22 RX ORDER — SUCCINYLCHOLINE CHLORIDE 20 MG/ML
INJECTION INTRAMUSCULAR; INTRAVENOUS
Status: DISCONTINUED | OUTPATIENT
Start: 2017-06-22 | End: 2017-06-22

## 2017-06-22 RX ORDER — LIDOCAINE HYDROCHLORIDE 10 MG/ML
1 INJECTION, SOLUTION EPIDURAL; INFILTRATION; INTRACAUDAL; PERINEURAL ONCE
Status: COMPLETED | OUTPATIENT
Start: 2017-06-22 | End: 2017-06-22

## 2017-06-22 RX ORDER — OXYCODONE HYDROCHLORIDE 5 MG/1
5 TABLET ORAL ONCE AS NEEDED
Status: COMPLETED | OUTPATIENT
Start: 2017-06-22 | End: 2017-06-22

## 2017-06-22 RX ADMIN — LIDOCAINE HYDROCHLORIDE 100 MG: 20 INJECTION, SOLUTION INTRAVENOUS at 07:06

## 2017-06-22 RX ADMIN — FENTANYL CITRATE 50 MCG: 50 INJECTION INTRAMUSCULAR; INTRAVENOUS at 09:06

## 2017-06-22 RX ADMIN — LIDOCAINE HYDROCHLORIDE 10 MG: 10 INJECTION, SOLUTION EPIDURAL; INFILTRATION; INTRACAUDAL; PERINEURAL at 06:06

## 2017-06-22 RX ADMIN — ATROPA BELLADONNA AND OPIUM 30 MG: 16.2; 3 SUPPOSITORY RECTAL at 12:06

## 2017-06-22 RX ADMIN — SODIUM CHLORIDE, SODIUM LACTATE, POTASSIUM CHLORIDE, AND CALCIUM CHLORIDE: .6; .31; .03; .02 INJECTION, SOLUTION INTRAVENOUS at 11:06

## 2017-06-22 RX ADMIN — FENTANYL CITRATE 100 MCG: 50 INJECTION INTRAMUSCULAR; INTRAVENOUS at 07:06

## 2017-06-22 RX ADMIN — FENTANYL CITRATE 50 MCG: 50 INJECTION INTRAMUSCULAR; INTRAVENOUS at 08:06

## 2017-06-22 RX ADMIN — KETOROLAC TROMETHAMINE 30 MG: 30 INJECTION, SOLUTION INTRAMUSCULAR at 12:06

## 2017-06-22 RX ADMIN — ROCURONIUM BROMIDE 5 MG: 10 INJECTION, SOLUTION INTRAVENOUS at 07:06

## 2017-06-22 RX ADMIN — OXYCODONE HYDROCHLORIDE 5 MG: 5 TABLET ORAL at 11:06

## 2017-06-22 RX ADMIN — CEFAZOLIN SODIUM 2 G: 2 SOLUTION INTRAVENOUS at 07:06

## 2017-06-22 RX ADMIN — FENTANYL CITRATE 25 MCG: 50 INJECTION, SOLUTION INTRAMUSCULAR; INTRAVENOUS at 10:06

## 2017-06-22 RX ADMIN — SUCCINYLCHOLINE CHLORIDE 180 MG: 20 INJECTION, SOLUTION INTRAMUSCULAR; INTRAVENOUS at 07:06

## 2017-06-22 RX ADMIN — SODIUM CHLORIDE, SODIUM LACTATE, POTASSIUM CHLORIDE, AND CALCIUM CHLORIDE: .6; .31; .03; .02 INJECTION, SOLUTION INTRAVENOUS at 08:06

## 2017-06-22 RX ADMIN — ONDANSETRON 4 MG: 2 INJECTION, SOLUTION INTRAMUSCULAR; INTRAVENOUS at 07:06

## 2017-06-22 RX ADMIN — SODIUM CHLORIDE, SODIUM LACTATE, POTASSIUM CHLORIDE, AND CALCIUM CHLORIDE: .6; .31; .03; .02 INJECTION, SOLUTION INTRAVENOUS at 06:06

## 2017-06-22 RX ADMIN — OXYCODONE HYDROCHLORIDE 5 MG: 5 TABLET ORAL at 10:06

## 2017-06-22 RX ADMIN — LIDOCAINE HYDROCHLORIDE: 20 JELLY TOPICAL at 01:06

## 2017-06-22 RX ADMIN — GENTAMICIN SULFATE 80 MG: 80 INJECTION, SOLUTION INTRAVENOUS at 07:06

## 2017-06-22 RX ADMIN — DEXAMETHASONE SODIUM PHOSPHATE 8 MG: 4 INJECTION, SOLUTION INTRAMUSCULAR; INTRAVENOUS at 07:06

## 2017-06-22 RX ADMIN — PROPOFOL 200 MG: 10 INJECTION, EMULSION INTRAVENOUS at 07:06

## 2017-06-22 RX ADMIN — FENTANYL CITRATE 25 MCG: 50 INJECTION, SOLUTION INTRAMUSCULAR; INTRAVENOUS at 11:06

## 2017-06-22 RX ADMIN — MIDAZOLAM 2 MG: 1 INJECTION INTRAMUSCULAR; INTRAVENOUS at 07:06

## 2017-06-22 NOTE — ANESTHESIA PREPROCEDURE EVALUATION
06/22/2017  Mo Escobar is a 57 y.o., male.    Anesthesia Evaluation    I have reviewed the Patient Summary Reports.    I have reviewed the Nursing Notes.      Review of Systems  Anesthesia Hx:  No problems with previous Anesthesia    Cardiovascular:   Hx of PE - recent LE blood clots - on heparin last two weeks       Physical Exam  General:  Obesity                 Anesthesia Plan  Type of Anesthesia, risks & benefits discussed:  Anesthesia Type:  general  Patient's Preference:   Intra-op Monitoring Plan:   Intra-op Monitoring Plan Comments:   Post Op Pain Control Plan:   Post Op Pain Control Plan Comments:   Induction:   IV  Beta Blocker:  Patient is not currently on a Beta-Blocker (No further documentation required).       Informed Consent: Patient understands risks and agrees with Anesthesia plan.  Questions answered. Anesthesia consent signed with patient.  ASA Score: 3     Day of Surgery Review of History & Physical:    H&P update referred to the surgeon.         Ready For Surgery From Anesthesia Perspective.

## 2017-06-22 NOTE — ANESTHESIA POSTPROCEDURE EVALUATION
Anesthesia Post Evaluation    Patient: Mo Escobar    Procedure(s) Performed: Procedure(s) (LRB):  TRANSURETHRAL LASER VAPORIZATION OF PROSTATE WITH QANTA LASER (N/A)  PROSTATECTOMY-TRANSURETHRAL (N/A)    Final Anesthesia Type: general  Patient location during evaluation: PACU  Patient participation: Yes- Able to Participate  Level of consciousness: awake and alert and oriented  Post-procedure vital signs: reviewed and stable  Pain management: adequate  Airway patency: patent  PONV status at discharge: No PONV  Anesthetic complications: no      Cardiovascular status: blood pressure returned to baseline  Respiratory status: unassisted, spontaneous ventilation and room air  Hydration status: euvolemic  Follow-up not needed.        Visit Vitals  BP (!) 141/76 (BP Location: Right arm, Patient Position: Lying, BP Method: Automatic)   Pulse 68   Temp 36.5 °C (97.7 °F) (Temporal)   Resp 18   Ht 6' (1.829 m)   Wt 133.8 kg (295 lb)   SpO2 98%   BMI 40.01 kg/m²       Pain/Abigail Score: Pain Assessment Performed: Yes (6/22/2017 10:41 AM)  Presence of Pain: complains of pain/discomfort (6/22/2017 10:41 AM)  Pain Rating Prior to Med Admin: 4 (6/22/2017 11:11 AM)  Abigail Score: 10 (6/22/2017 10:50 AM)

## 2017-06-22 NOTE — DISCHARGE INSTRUCTIONS
Diet - resume home diet  Follow up: Tues 6/27/17 for voiding trial  Instructions: drink plenty of water, may see blood in urine  Hold hold anticoagulation, Lovenox for a minimum of 24 hours, can increase to 48 hours if still having blood in urine. Hold Coumadin per Dr. Garrido's instruction.  Meds: sent to pharmacy  No lifting greater than 10 lbs for 4 weeks    1. At 12:00, untape barker from traction and remove all tape, remove 20cc from balloon, and place to stat lock/leg bag.  2. Review barker teaching and how to switch from leg bag to night bag and instruct to wear large bag at night  3. MD will assess after has been off of traction for at least 30-60 mins and give ok to discharge.  4. Advise patient to hold lovenox 24 hours, can increase to 48 hours if urine grossly bloody tomorrow. Continue to hold coumadin and resume as per Dr GarridoWe hope your stay was comfortable as you heal now, mend and rest.    For we have enjoyed taking care of you by giving your our best.    And as you get better, by regaining your health and strength;   We count it as a privilege to have served you and hope your time at [facility] was well spent.      Thank  You!!!Discharge Instructions: After Your Surgery/Procedure  Youve just had surgery. During surgery you were given medicine called anesthesia to keep you relaxed and free of pain. After surgery you may have some pain or nausea. This is common. Here are some tips for feeling better and getting well after surgery.     Stay on schedule with your medication.   Going home  Your doctor or nurse will show you how to take care of yourself when you go home. He or she will also answer your questions. Have an adult family member or friend drive you home.      For your safety we recommend these precaution for the first 24 hours after your procedure:  · Do not drive or use heavy equipment.  · Do not make important decisions or sign legal papers.  · Do not drink alcohol.  · Have someone stay  "with you, if needed. He or she can watch for problems and help keep you safe.  · Your concentration, balance, coordination, and judgement may be impaired for many hours after anesthesia.  Use caution when ambulating or standing up.     · You may feel weak and "washed out" after anesthesia and surgery.      Subtle residual effects of general anesthesia or sedation with regional / local anesthesia can last more than 24 hours.  Rest for the remainder of the day or longer if your Doctor/Surgeon has advised you to do so.  Although you may feel normal within the first 24 hours, your reflexes and mental ability may be impaired without you realizing it.  You may feel dizzy, lightheaded or sleepy for 24 hours or longer.      Be sure to go to all follow-up visits with your doctor. And rest after your surgery for as long as your doctor tells you to.  Coping with pain  If you have pain after surgery, pain medicine will help you feel better. Take it as told, before pain becomes severe. Also, ask your doctor or pharmacist about other ways to control pain. This might be with heat, ice, or relaxation. And follow any other instructions your surgeon or nurse gives you.  Tips for taking pain medicine  To get the best relief possible, remember these points:  · Pain medicines can upset your stomach. Taking them with a little food may help.  · Most pain relievers taken by mouth need at least 20 to 30 minutes to start to work.  · Taking medicine on a schedule can help you remember to take it. Try to time your medicine so that you can take it before starting an activity. This might be before you get dressed, go for a walk, or sit down for dinner.  · Constipation is a common side effect of pain medicines. Call your doctor before taking any medicines such as laxatives or stool softeners to help ease constipation. Also ask if you should skip any foods. Drinking lots of fluids and eating foods such as fruits and vegetables that are high in " fiber can also help. Remember, do not take laxatives unless your surgeon has prescribed them.  · Drinking alcohol and taking pain medicine can cause dizziness and slow your breathing. It can even be deadly. Do not drink alcohol while taking pain medicine.  · Pain medicine can make you react more slowly to things. Do not drive or run machinery while taking pain medicine.  Your health care provider may tell you to take acetaminophen to help ease your pain. Ask him or her how much you are supposed to take each day. Acetaminophen or other pain relievers may interact with your prescription medicines or other over-the-counter (OTC) drugs. Some prescription medicines have acetaminophen and other ingredients. Using both prescription and OTC acetaminophen for pain can cause you to overdose. Read the labels on your OTC medicines with care. This will help you to clearly know the list of ingredients, how much to take, and any warnings. It may also help you not take too much acetaminophen. If you have questions or do not understand the information, ask your pharmacist or health care provider to explain it to you before you take the OTC medicine.  Managing nausea  Some people have an upset stomach after surgery. This is often because of anesthesia, pain, or pain medicine, or the stress of surgery. These tips will help you handle nausea and eat healthy foods as you get better. If you were on a special food plan before surgery, ask your doctor if you should follow it while you get better. These tips may help:  · Do not push yourself to eat. Your body will tell you when to eat and how much.  · Start off with clear liquids and soup. They are easier to digest.  · Next try semi-solid foods, such as mashed potatoes, applesauce, and gelatin, as you feel ready.  · Slowly move to solid foods. Dont eat fatty, rich, or spicy foods at first.  · Do not force yourself to have 3 large meals a day. Instead eat smaller amounts more often.  · Take  pain medicines with a small amount of solid food, such as crackers or toast, to avoid nausea.     Call your surgeon if  · You still have pain an hour after taking medicine. The medicine may not be strong enough.  · You feel too sleepy, dizzy, or groggy. The medicine may be too strong.  · You have side effects like nausea, vomiting, or skin changes, such as rash, itching, or hives.       If you have obstructive sleep apnea  You were given anesthesia medicine during surgery to keep you comfortable and free of pain. After surgery, you may have more apnea spells because of this medicine and other medicines you were given. The spells may last longer than usual.   At home:  · Keep using the continuous positive airway pressure (CPAP) device when you sleep. Unless your health care provider tells you not to, use it when you sleep, day or night. CPAP is a common device used to treat obstructive sleep apnea.  · Talk with your provider before taking any pain medicine, muscle relaxants, or sedatives. Your provider will tell you about the possible dangers of taking these medicines.  © 4809-0851 [employer]. [address] [place] [address]. All rights reserved. This information is not intended as a substitute for professional medical care. Always follow your healthcare professional's instructions.  Using Opioids for Pain Management     Your doctor has given instructions for you to take an opioid.  This is a drug for bad pain.  It helps control pain without causing bleeding and kidney problems.  Common opioid names are morphine, hydromorphone, oxycodone, and methadone. These drugs are called narcotics.    There are several safety concerns you need to know.     · It is against the law to give or sell this drug to another person.  You must keep this medicine safely locked.    · You may have side effects from taking this medication.  These include nausea, itching, sweating, sleepiness, a change in your ability  to breathe, and depression.  · Do not take alcohol or sleeping pills opioids.    · Long-term opoid use may no longer giver you relief from pain.  It can cause you stomach pain, mental anxiety, and headaches.  Long-term opoid use can potentially lead to unlawful street drug abuse and reduce your ability to stay employed.    · Your body may become opioid tolerant if you need to take more to get relief.    · You must stop taking opioids if you begin having more pain as a result of the medicine.    · Opioid withdrawal occurs when you have to stop taking the drug.  It can cause you to have nausea, vomiting, diarrhea, stomach pain, anxiety, and dilated pupils in your eyes. This condition means you are opioid dependent.    · Addiction is a drug induced brain disease. It means there are changes in how your brain is working.  Children, teens, and young adults under 25 years old are more likely to get addicted to opioids.      · Addiction can happen with repeated opioid use.  It does not happen with short-term use of two weeks or less.       For more information, please speak with your doctor or pharmacist.

## 2017-06-22 NOTE — BRIEF OP NOTE
[facility] Urology  Brief Operative/Discharge Note    Date: 06/22/2017    Staff Surgeon: Mike Mcconnell MD    Pre-Op Diagnosis:   1. Urinary retention secondary to benign prostatic hyperplasia  2. Gross hematuria, prostatic source  3. Chronic anticoagulation secondary to clotting disorder    Post-Op Diagnosis: same    Procedure(s) Performed:   1. Transurethral resection of prostate, bipolar  2. Transurethral laser vaporization of prostate, Qanta    Specimen(s): prostate chips    Anesthesia: General endotracheal anesthesia    Findings: large obstructing erythematous prostate    Estimated Blood Loss: 75cc    Drains: 24fr barker, 50cc in 30cc balloon   (20cc to be removed prior to discharge when untaped from traction)    Complications: none    Disposition: pacu    Discharge home today status post uncomplicated procedure as above  Diet - resume home diet  Follow up: Tues 6/27/17 for voiding trial  Instructions: drink plenty of water, may see blood in urine  Hold anticoagulation minimum 24 hours, can increase to 48 hours if still having blood in urine  Meds:     Medication List      START taking these medications    lidocaine HCL 2% 2 % jelly  Commonly known as:  XYLOCAINE  Apply topically as needed. For catheter irritation     sulfamethoxazole-trimethoprim 800-160mg 800-160 mg Tab  Commonly known as:  BACTRIM DS  Take 1 tablet by mouth 2 (two) times daily.     tramadol 50 mg tablet  Commonly known as:  ULTRAM  Take 1 tablet (50 mg total) by mouth every 6 (six) hours as needed for Pain.        CONTINUE taking these medications    enoxaparin 100 mg/mL Syrg  Commonly known as:  LOVENOX  Inject 1.3 mLs (130 mg total) into the skin 2 (two) times daily.     finasteride 5 mg tablet  Commonly known as:  PROSCAR  Take 1 tablet (5 mg total) by mouth once daily.     FOLBIC 2.5-25-2 mg Tab  Generic drug:  folic acid-vit B6-vit B12 2.5-25-2 mg     SYNTHROID 112 MCG tablet  Generic drug:  levothyroxine     tamsulosin 0.4 mg  Cp24  Commonly known as:  FLOMAX  Take 1 capsule (0.4 mg total) by mouth once daily.     triamterene-hydrochlorothiazide 75-50 mg 75-50 mg per tablet  Commonly known as:  MAXZIDE  Take 1 tablet by mouth once daily.     VITAMIN D3 5,000 unit Tab  Generic drug:  cholecalciferol (vitamin D3)     warfarin 4 MG tablet  Commonly known as:  COUMADIN           Where to Get Your Medications      These medications were sent to [id number] - [address] [address]    Phone:  [phone]   · lidocaine HCL 2% 2 % jelly  · sulfamethoxazole-trimethoprim 800-160mg 800-160 mg Tab  · tramadol 50 mg tablet

## 2017-06-22 NOTE — PLAN OF CARE
Per Dr. Mcconnell - ok to discharge home.  Barker cath draining pink to red urine with no clots = 250ml emptied from barker bag.  Stat-lock applied by Dr. Mcconnell to right upper thigh.  Tension and tape removed at 1200 along with 20ml of water from barker balloon.  VSS.  Pt feeling much better after Toradol 30mg, B&O supp 30mg and Lidocaine 2% to head of penis given.  Pt tolerated po intake of x2 sprites with no n/v noted.  Discharge instructions reviewed with the patient and spouse including barker care. Leg bag given to the patient but he refused applying it on at this time.

## 2017-06-22 NOTE — TELEPHONE ENCOUNTER
Lovenox bridge protocol reviewed with pt prior to procedure he should resume coumadin post procedure as previously discussed along with Lovenox until INR is within therapeutic range 2.0-3.0.  Written medication instructions mailed to pt. PT INR should be checked every other day until off of Lovenox. PT INR cheeked weekly thereafter. Understanding verbalized. Will message MD for further instruction. Pt verbalized understanding. Encouraged to call clinic or pharmacy with any questions or concerns.

## 2017-06-22 NOTE — TELEPHONE ENCOUNTER
----- Message from Senait Fabian sent at 6/22/2017  2:15 PM CDT -----  Patient called in he had surgery this morning and would like to clarify when he can start back his warfarin. I asked pt what kind of surgery he had and he said that went through his urethrae and took part of his prostate.    Please call pt back at [phone]

## 2017-06-22 NOTE — H&P
"[facility] Urology Progress Note     Mo Escobar is a 57 y.o. male who presents for gross hematuria follow up.      I saw him as inpt consult at [facility] on 5/29/17 where he had just completed CBI for gross hematuria which has been a recurrent problem given his history of BPH and clotting disorder with DVTs/PEs on chronic anticoagulation.   Previously followed by Dr Humphrey since urinary retention episode in 2014 and had TURP with lovenox bridge at that time. Reports that ever since that TURP in 2014 he has had intermittent GH for a few days at a time.  Longest hematuria free interval in 3 years has been 2 mos. Path reportedly benign at that time.  PSA 10/2015 4.3, 4/2016 3.6. Has not had dedicated prostate biopsy.  4/2016 negative RBUS  5/2016 noncon CT normal except prostatomegaly ([id number]) with sig median lobe (larger med lobe than in 2010)  6/2016 scope by Dr Humphrey as part of GH workup only noted BPH regrowth and was started on finasteride for presumed prostatic bleeding (which he stopped 9/2016 noting a "funny feeling")     2 weeks of GH with clots prompted his [facility] admit late may. Once barker out, retention and needed replaced next day after which I saw him. Recommended restarting flomax (had been off since pre-TURP) and had nurse visit yesterday for barker removal.  Discussed restarting 5ari but only after free total psa after his barker had been out a few week for assessment of his age specific elevated psa. Also recommended fresh GH workup with CT urogram and cystoscopy. With further interventions based on assessment of psa and bladder/urethral findings.   He was found to have nonocclusive R popliteal DVT during his [facility] admit     Resumed anticoagulation (warfarin with lovenox bridge) yesterday. Had recurrence of GH and called today noting frankly bloody urine and has passed some clots.  I advised he come in to clinic for catheter placement and bladder irrigation, and expedite timeline of " evaluation.  Does state GH started to resume last night even before starting anticoagulation. Had otherwise been clear with barker in place.     He also mentioned today he has been on TRT for 6 months, Q2 weeks, with last injection <1 week ago as he was found to have low T on fatigue workup earlier this year.     ROS: A comprehensive 10 system review was performed and is negative except as noted above in HPI     PHYSICAL EXAM:  General: Alert, cooperative, no distress, appears stated age   Head: Normocephalic, without obvious abnormality, atraumatic   Eyes: PERRL, conjunctiva/corneas clear   Lungs: Respirations unlabored   Heart: Warm and well perfused   Abdomen: soft NT ND  : uncirc normal phallus, normal meatus, bilat desc testes without mass.  Extremities: Extremities normal, atraumatic, no cyanosis or edema   Skin: Skin color, texture, turgor normal, no rashes or lesions   Psych: Appropriate   Neurologic: Non-focal                Recent Results   PSA 9.3  Prostate biopsy negative     ASSESSMENT   1. Gross hematuria  Urine culture     Cytology, urine     finasteride (PROSCAR) 5 mg tablet   2. Elevated PSA, less than 10 ng/ml      3. Benign prostatic hyperplasia, presence of lower urinary tract symptoms unspecified, unspecified morphology  tamsulosin (FLOMAX) 0.4 mg Cp24     finasteride (PROSCAR) 5 mg tablet   4. Hypogonadism in male            Plan    Recommended barker remaining indwelling for prostatic urethral tamponade effect until cystoscopic evaluation, and resuming finasteride to decrease prostatic bleeding.   As PSA returned elevated, underwent TRUS biopsy 1 week ago which was negative for maligancy. Cysto found prostate to be significantly obstructing with median lobe appearing enlarged and grossly erythematous and abnormal. CT urogram negative except for prostatomegaly.  Given his BPH and recent retention should continue flomax.  Have planned for OR 6/22/17 as that is next prostate laser availability as  we discussed his friable prostate regrowth is source of his hematuria so will need TURP and would prefer laser vaporization for hemostasis.  Cleared by Dr Garrido and on Incuron bridge. Advised he hold off on any TRT pending eval of psa/prostate. Discussed that TRT does not cause prostate cancer, but if was too develop could exacerbate.

## 2017-06-22 NOTE — TRANSFER OF CARE
Anesthesia Transfer of Care Note    Patient: Mo Escobar    Procedure(s) Performed: Procedure(s) (LRB):  TRANSURETHRAL LASER VAPORIZATION OF PROSTATE WITH QANTA LASER (N/A)  PROSTATECTOMY-TRANSURETHRAL (N/A)    Patient location: PACU    Anesthesia Type: general    Transport from OR: Transported from OR on 2-3 L/min O2 by NC with adequate spontaneous ventilation    Post pain: adequate analgesia    Post assessment: no apparent anesthetic complications    Post vital signs: stable    Level of consciousness: awake    Nausea/Vomiting: no nausea/vomiting    Complications: none    Transfer of care protocol was followed      Last vitals:   Visit Vitals  /66 (BP Location: Left arm, Patient Position: Lying)   Pulse (!) 57   Temp 36.7 °C (98.1 °F) (Other (see comments))   Ht 6' (1.829 m)   Wt 133.8 kg (295 lb)   SpO2 98%   BMI 40.01 kg/m²

## 2017-06-22 NOTE — INTERVAL H&P NOTE
The patient has been examined and the H&P has been reviewed:    No change to above, Confirmed patient held lovenox yesterday. Proceed with TURP/TULVP    Anesthesia/Surgery risks, benefits and alternative options discussed and understood by patient/family.          Active Hospital Problems    Diagnosis  POA    BPH (benign prostatic hyperplasia) [N40.0]  Yes      Resolved Hospital Problems    Diagnosis Date Resolved POA   No resolved problems to display.

## 2017-06-23 NOTE — OP NOTE
Urology Operative Report    Date: 06/22/2017     Staff Surgeon: Mike Mcconnell MD     Pre-Op Diagnosis:   1. Urinary retention secondary to benign prostatic hyperplasia  2. Gross hematuria, prostatic source  3. Chronic anticoagulation secondary to clotting disorder     Post-Op Diagnosis: same     Procedure(s) Performed:   1. Transurethral resection of prostate, bipolar  2. Transurethral laser vaporization of prostate, Qanta     Specimen(s): prostate chips     Anesthesia: General endotracheal anesthesia     Findings: large obstructing erythematous prostate     Estimated Blood Loss: 75cc     Drains: 24fr barker, 50cc in 30cc balloon   (20cc to be removed prior to discharge when untaped from traction)     Complications: none     Disposition: pacu    Indications for Procedure:  Mr Young is a 58 yo M with hx BPH s/p TURP in 2014 but persistent gross hematuria intermittently since, worsening lately as he is chronically anticoagulated for clotting disorder. Recent admit to [facility] cleared GH on CBI but unable to void and had barker replaced. After removal had recurrence of raghu hematuria within one day necessitating barker and irrigation. PSA has also been historically elevated for his age, and planning TURP/laser vaporization to resolve retention and GH as prostate previously found to be source of bleeding.  Has been on lovenox bridge with clear urine and presented for cystoscopic evaluation of his GH and prostate biopsy on 6/13/17. 92g prostate by ultrasound with cystoscopic evidence of outlet obstruction with significant regrowth of lateral lobes and very large significant abnormal hypervascular edematous median lobe with calcifications. CT urogram negative except for prostatomegaly.  We discussed TURP with concurrent laser vaporization, given his chronic anticoagulation, for management of his obstructing prostate to resolve his retention. After thorough discussion of risks and benefits, including continued difficulty  voiding postop and need for further catheterization and bladder retraining, he elected to proceed with above noted procedure and appropriate informed consent obatined. He was cleared by Dr Garrido for lovenox bridge perioperatively, and has held coumadin.     Procedure in Detail:     After appropriate informed consent was obtained, the patient was taken back to the cystoscopy suite and placed in the lithotomy position. A WHO-approved time out was performed and preoperative antibiotics were given. He was then prepped and draped in the usual sterile fashion.       As I had recently performed cystoscopy, the resectoscope was then assembled and placed into the patient's bladder using a visual obturator.  The patient's bladder was drained and the visual obturator was exchanged for the bipolar resectoscope loop.       I first used the bipolar resectoscope to resect his median lobe and some of the tissue between 5 and 7 o'clock to create a central channel and obtain tissue for pathology.  I also wanted to ensure this area was safely resected without laser compromise of his trigone.  His ureteral orifices were seen on the trigone in close proximity to median lobe and bladder neck and constantly checked throughout the course of this procedure to ensure they were not involved in resection or coagulation     Prostate chips were sent for pathologic analysis after removal with Luciana evacuator, after which time the Quanta laser scope was assembled and passed into the prostatic urethra, only after one more re-check with resectoscope loop to remove any remaining prostate chips and spot coagulating any bleeding vessels.       End-fire Quanta laser fiber was then used to perform systematic laser vaporization of prostate, completing the central channel from 5 to 7 o'clock, and then systematically resecting his lateral lobes.  The verumontanum was kept in view the entire time and all resection and laser vaporization is performed  proximal to it. He did have some anterior obstructing tissue which was carefully vaporized as well.  After adequate laser vaporization of his obstructing prostate tissue, a patent open prostatic urethra was viewed with the scope placed at the verumontanum.  With the water and suction off bed of resection appeared to be hemostatic, so the laser scope was removed from the bladder.     A 24-Nepali Barker catheter was placed with 50cc in a 30cc balloon, and 60cc cath tip syringe used to irrigate the bladder.  His urine was noted clear at time of irrigation and very small punctate pinpoint fragments of prostate tissue residual from his laser vaporization were irrigated free from his bladder.  Barker catheter was placed to drainage bag after being secured to traction on right thigh with mastasol and silk tape.   Urine in the barker bag was clear.      The patient tolerated the procedure well with no complications and was awakened and transferred to the recovery room in stable condition.       Disposition:   The patient will be discharged home today status post uncomplicated procedure as above.  His barker will be removed from traction in 2 hours and placed to statlock and leg bag, and 20cc will be removed from balloon. He will restart his lovenox in 24-48 hours depending on clarity of urine, and return in 5 days for fill and pull voiding trial. He will then follow up with Dr Garrido to resume prescribed course of anticoagulation.

## 2017-06-25 LAB
BLD PROD TYP BPU: NORMAL
BLD PROD TYP BPU: NORMAL
BLOOD UNIT EXPIRATION DATE: NORMAL
BLOOD UNIT EXPIRATION DATE: NORMAL
BLOOD UNIT TYPE CODE: 6200
BLOOD UNIT TYPE CODE: 6200
BLOOD UNIT TYPE: NORMAL
BLOOD UNIT TYPE: NORMAL
CODING SYSTEM: NORMAL
CODING SYSTEM: NORMAL
DISPENSE STATUS: NORMAL
DISPENSE STATUS: NORMAL
NUM UNITS TRANS PACKED RBC: NORMAL
NUM UNITS TRANS PACKED RBC: NORMAL

## 2017-06-27 ENCOUNTER — OFFICE VISIT (OUTPATIENT)
Dept: HEMATOLOGY/ONCOLOGY | Facility: CLINIC | Age: 57
End: 2017-06-27
Payer: MEDICARE

## 2017-06-27 ENCOUNTER — CLINICAL SUPPORT (OUTPATIENT)
Dept: UROLOGY | Facility: CLINIC | Age: 57
End: 2017-06-27
Payer: MEDICARE

## 2017-06-27 VITALS
BODY MASS INDEX: 39.47 KG/M2 | SYSTOLIC BLOOD PRESSURE: 111 MMHG | RESPIRATION RATE: 18 BRPM | WEIGHT: 291.38 LBS | HEIGHT: 72 IN | TEMPERATURE: 98 F | DIASTOLIC BLOOD PRESSURE: 68 MMHG | HEART RATE: 70 BPM

## 2017-06-27 DIAGNOSIS — Z15.89 HETEROZYGOUS MTHFR MUTATION A1298C: ICD-10-CM

## 2017-06-27 DIAGNOSIS — I82.431 ACUTE DEEP VEIN THROMBOSIS (DVT) OF POPLITEAL VEIN OF RIGHT LOWER EXTREMITY: ICD-10-CM

## 2017-06-27 DIAGNOSIS — I26.92 CHRONIC SADDLE PULMONARY EMBOLISM WITHOUT ACUTE COR PULMONALE: Primary | ICD-10-CM

## 2017-06-27 DIAGNOSIS — I27.82 CHRONIC SADDLE PULMONARY EMBOLISM WITHOUT ACUTE COR PULMONALE: Primary | ICD-10-CM

## 2017-06-27 DIAGNOSIS — Z15.89 HETEROZYGOUS MTHFR MUTATION C677T: ICD-10-CM

## 2017-06-27 LAB
ALBUMIN SERPL-MCNC: 4.2 G/DL (ref 3.1–4.7)
ALP SERPL-CCNC: 42 IU/L (ref 40–104)
ALT (SGPT): 22 IU/L (ref 3–33)
AST SERPL-CCNC: 16 IU/L (ref 10–40)
BASOPHILS NFR BLD: 0 K/UL (ref 0–0.2)
BASOPHILS NFR BLD: 0.3 %
BILIRUB SERPL-MCNC: 0.5 MG/DL (ref 0.3–1)
BUN SERPL-MCNC: 15 MG/DL (ref 8–20)
CALCIUM SERPL-MCNC: 9.3 MG/DL (ref 7.7–10.4)
CHLORIDE: 100 MMOL/L (ref 98–110)
CO2 SERPL-SCNC: 26.5 MMOL/L (ref 22.8–31.6)
CREATININE: 1.15 MG/DL (ref 0.6–1.4)
EOSINOPHIL NFR BLD: 0.4 K/UL (ref 0–0.7)
EOSINOPHIL NFR BLD: 4.1 %
ERYTHROCYTE [DISTWIDTH] IN BLOOD BY AUTOMATED COUNT: 14.9 % (ref 12.5–14.5)
GLUCOSE: 128 MG/DL (ref 70–99)
GRAN #: 7.1 K/UL (ref 1.4–6.5)
GRAN%: 74.8 %
HCT VFR BLD AUTO: 32.4 % (ref 39–55)
HGB BLD-MCNC: 9.9 G/DL (ref 14–16)
IMMATURE GRANS (ABS): 0 K/UL (ref 0–1)
IMMATURE GRANULOCYTES: 0.4 %
INR PPP: 1.2
LYMPH #: 1.5 K/UL (ref 1.2–3.4)
LYMPH%: 15.4 %
MCH RBC QN AUTO: 23.3 PG (ref 25–35)
MCHC RBC AUTO-ENTMCNC: 30.6 G/DL (ref 31–36)
MCV RBC AUTO: 76.2 FL (ref 80–100)
MONO #: 0.5 K/UL (ref 0.1–0.6)
MONO%: 5 %
NUCLEATED RBCS: 0 %
NUCLEATED RED BLOOD CELLS: 0 /100 WBC
PERFORMED BY:: ABNORMAL
PLATELET # BLD AUTO: 318 K/UL (ref 140–440)
PMV BLD AUTO: 9.6 FL (ref 8.8–12.7)
POTASSIUM SERPL-SCNC: 4.1 MMOL/L (ref 3.5–5)
PROT SERPL-MCNC: 7.9 G/DL (ref 6–8.2)
PROTHROMBIN TIME: 15.1 SEC (ref 11.3–15.2)
RBC # BLD AUTO: 4.25 M/UL (ref 4.3–5.9)
SODIUM: 137 MMOL/L (ref 134–144)
WBC # BLD: 9.5 K/UL (ref 5–10)

## 2017-06-27 PROCEDURE — 99214 OFFICE O/P EST MOD 30 MIN: CPT | Mod: ,,, | Performed by: INTERNAL MEDICINE

## 2017-06-27 PROCEDURE — 99999 PR PBB SHADOW E&M-EST. PATIENT-LVL I: CPT | Mod: PBBFAC,,,

## 2017-06-27 PROCEDURE — 99211 OFF/OP EST MAY X REQ PHY/QHP: CPT | Mod: PBBFAC,PO

## 2017-06-27 RX ORDER — ENOXAPARIN SODIUM 100 MG/ML
INJECTION SUBCUTANEOUS
Refills: 1 | COMMUNITY
Start: 2017-06-13 | End: 2017-07-31

## 2017-06-27 NOTE — LETTER
June 27, 2017      Edwin Saldivar MD  [address] [place] [address]           [facility]  [address]  [address]  [place] [address]  Phone: [phone]  Fax: [phone]          Patient: Mo Escobar   MR Number: [id number]   YOB: 1960   Date of Visit: 6/27/2017       Dear Dr. Edwin Saldiavr:    Thank you for referring Mo Escobar to me for evaluation. Attached you will find relevant portions of my assessment and plan of care.    If you have questions, please do not hesitate to call me. I look forward to following Mo Escobar along with you.    Sincerely,    Dion Garrido MD    Enclosure  CC:  No Recipients    If you would like to receive this communication electronically, please contact [email or web address] or [phone] to request more information on [employer] access.    For providers and/or their staff who would like to refer a patient to [facility], please contact us through our one-stop-shop provider referral line, [facility], at [phone].    If you feel you have received this communication in error or would no longer like to receive these types of communications, please e-mail [email or web address]

## 2017-06-27 NOTE — PROGRESS NOTES
"       [facility]            PROGRESS NOTE      Subjective:       Patient ID:   NAME: Mo Escobar : 1960     57 y.o. male    Referring Doc: Edwin Saldivar MD  Other Physicians: Travis Best Pinsky    Chief Complaint:  f/u    History of Present Illness:     Patient returns today for an earlier scheduled follow-up visit.  The patient recently had hospitalization at [facility] with recurrent hematuria. He also had a recurrent DVT in the lower extremity. He has undergone evaluation and cystoscope with Dr Mcconnell with  followed by a repeat TURPT. Leg doing good; barker removed today; feeling "good"; no CP, SOB, HA's or N/V            ROS:   GEN: normal without any fever, night sweats or weight loss  HEENT: normal with no HA's, sore throat, stiff neck, changes in vision  CV: normal with no CP, SOB, PND, HERRERA or orthopnea  PULM: normal with no SOB, cough, hemoptysis, sputum or pleuritic pain  GI: normal with no abdominal pain, nausea, vomiting, constipation, diarrhea, melanotic stools, BRBPR, or hematemesis  : normal with no hematuria, dysuria  BREAST: normal with no mass, discharge, pain  SKIN: normal with no rash, erythema, bruising, or swelling    Allergies:  Review of patient's allergies indicates:  No Known Allergies    Medications:    Current Outpatient Prescriptions:     cholecalciferol, vitamin D3, (VITAMIN D3) 5,000 unit Tab, Take 5,000 Units by mouth once daily., Disp: , Rfl:     enoxaparin (LOVENOX) 100 mg/mL Syrg, Inject 1.3 mLs (130 mg total) into the skin 2 (two) times daily., Disp: 20 Syringe, Rfl: 1    enoxaparin (LOVENOX) 30 mg/0.3 mL Syrg, INJECT 1 SYRINGE SC BID. INJECT WITH 100 MG SYRINGE, Disp: , Rfl: 1    finasteride (PROSCAR) 5 mg tablet, Take 1 tablet (5 mg total) by mouth once daily., Disp: 30 tablet, Rfl: 11    folic acid-vit B6-vit B12 2.5-25-2 mg (FOLBIC) 2.5-25-2 mg Tab, Take 1 tablet by mouth once daily., Disp: , Rfl:     levothyroxine (SYNTHROID) 112 MCG tablet, " Take 112 mcg by mouth once daily., Disp: , Rfl:     lidocaine HCL 2% (XYLOCAINE) 2 % jelly, Apply topically as needed. For catheter irritation, Disp: 5 mL, Rfl: 0    sulfamethoxazole-trimethoprim 800-160mg (BACTRIM DS) 800-160 mg Tab, Take 1 tablet by mouth 2 (two) times daily., Disp: 10 tablet, Rfl: 0    tamsulosin (FLOMAX) 0.4 mg Cp24, Take 1 capsule (0.4 mg total) by mouth once daily., Disp: 30 capsule, Rfl: 11    tramadol (ULTRAM) 50 mg tablet, Take 1 tablet (50 mg total) by mouth every 6 (six) hours as needed for Pain., Disp: 20 tablet, Rfl: 0    triamterene-hydrochlorothiazide 75-50 mg (MAXZIDE) 75-50 mg per tablet, Take 1 tablet by mouth once daily., Disp: 30 tablet, Rfl: 11    warfarin (COUMADIN) 4 MG tablet, Take 6 mg by mouth Daily., Disp: , Rfl:     PMHx/PSHx Updates:  See patient's last visit with me on 4/4/17.  See H&P on 8/21/14  S/p cyctoscope and TURPT    Pathology:  none        Objective:     Vitals:  Blood pressure 111/68, pulse 70, temperature 98 °F (36.7 °C), resp. rate 18, height 6' (1.829 m), weight 132.2 kg (291 lb 6.4 oz).    Physical Examination:   GEN: no apparent distress, comfortable; AAOx3  HEAD: atraumatic and normocephalic  EYES: no pallor, no icterus, PERRLA  ENT: OMM, no pharyngeal erythema, external ears WNL; no nasal discharge; no thrush  NECK: no masses, thyroid normal, trachea midline, no LAD/LN's, supple  CV: RRR with no murmur; normal pulse; normal S1 and S2; no pedal edema  CHEST: Normal respiratory effort; CTAB; normal breath sounds; no wheeze or crackles  ABDOM: nontender and nondistended; soft; normal bowel sounds; no rebound/guarding  MUSC/Skeletal: ROM normal; no crepitus; joints normal; no deformities or arthropathy  EXTREM: no clubbing, cyanosis, inflammation or swelling  SKIN: no rashes, lesions, ulcers, petechiae or subcutaneous nodules  : no barker  NEURO: grossly intact; motor/sensory WNL; AAOx3; no tremors  PSYCH: normal mood, affect and behavior  LYMPH:  normal cervical, supraclavicular, axillary and groin LN's            Labs:   Lab Results   Component Value Date    WBC 6.10 06/21/2017    HGB 11.1 (L) 06/21/2017    HCT 33.7 (L) 06/21/2017    MCV 74 (L) 06/21/2017     06/21/2017    CMP  Sodium   Date Value Ref Range Status   06/21/2017 136 136 - 145 mmol/L Final     Potassium   Date Value Ref Range Status   06/21/2017 3.8 3.5 - 5.1 mmol/L Final     Chloride   Date Value Ref Range Status   06/21/2017 103 95 - 110 mmol/L Final     CO2   Date Value Ref Range Status   06/21/2017 22 (L) 23 - 29 mmol/L Final     Glucose   Date Value Ref Range Status   06/21/2017 118 (H) 70 - 110 mg/dL Final     BUN, Bld   Date Value Ref Range Status   06/21/2017 13 6 - 20 mg/dL Final     Creatinine   Date Value Ref Range Status   06/21/2017 1.0 0.5 - 1.4 mg/dL Final     Calcium   Date Value Ref Range Status   06/21/2017 8.7 8.7 - 10.5 mg/dL Final     Anion Gap   Date Value Ref Range Status   06/21/2017 11 8 - 16 mmol/L Final     eGFR if    Date Value Ref Range Status   06/21/2017 >60 >60 mL/min/1.73 m^2 Final     eGFR if non    Date Value Ref Range Status   06/21/2017 >60 >60 mL/min/1.73 m^2 Final     Comment:     Calculation used to obtain the estimated glomerular filtration  rate (eGFR) is the CKD-EPI equation. Since race is unknown   in our information system, the eGFR values for   -American and Non--American patients are given   for each creatinine result.       I have reviewed all available lab results and radiology reports.    Radiology/Diagnostic Studies:        Assessment/Plan:   (1) 57 y.o. male with diagnosis of chronic clot disorder with prior bilateral pulmonary emboli  - he has underlying clot disorder with MTHFR-A and MTHFR-C heterozygous condition  - he has been on coumadin long term per direction of Dr Best with cardiology    (2) Recurrent hematuria issues - he was recently hospitalized at [facility] and was previously under  the care of Dr Sanchez and is now under the care of Dr Mcconnell with   - recent cyctoscope and TURP with Dr Mcconnell; everything looked good per patient  - he had barker removed today  - minor hematuria residual    (3) Recurrent clot event with DVT in the right popliteal vein  - he had been lovenox and resumed the coumadin this past Saturday    (4) Pulmonary HTN hx - followed by Dr Robles with Pulmonary    (5) Hx/of gout issues    PLAN:  1. F/up with PCP, cardiology, pulm and   2. Continue lovenox and coumadin, check INR today and Friday (he can then eventually resume coumadin maintenance per Dr Best)  3. Fax note to [facility] Mcconnell [place], [place]  4. RTC 3 months        I spent over 25 mins with the patient, of which over half was face to face with the patient.     Discussion:     I have explained all of the above in detail and the patient understands all of the current recommendation(s). I have answered all of their questions to the best of my ability and to their complete satisfaction.   The patient is to continue with the current management plan.    RTC in  3 months          Electronically signed by Dion Garrido MD

## 2017-06-27 NOTE — PROGRESS NOTES
Pt arrived at clinic to have a fill and pull. Pt's urinary bag was disconnected. 180cc of sterile water was pushed into pt's bladder. Pt at that point felt full. 24FR barker cath with 30cc balloon was removed. Balloon was intacked with 25ml of NS removed. Pt began to release fluids. Pt immediately started urinating light red bloody urine. PVR done, pt had no residual. Pt to go home and drink plenty of water. Pt advised to come back to clinic if he cannot void between 2 - 3 pm today. Pt verbalized understanding.

## 2017-06-28 ENCOUNTER — TELEPHONE (OUTPATIENT)
Dept: HEMATOLOGY/ONCOLOGY | Facility: CLINIC | Age: 57
End: 2017-06-28

## 2017-06-28 NOTE — TELEPHONE ENCOUNTER
Spoke with pt going to cardiologist for repeat INR in am, currently taking 5mg coumadin along with lovenox INR results faxed to cardiologist's office. Normal INR levels discussed with pt. S/s of bleeding/blood clots reviewed. No questions or concerns verbalized at this time. Will route INR results to Dr. Mcconnell's office for f/u / treatment.

## 2017-06-28 NOTE — TELEPHONE ENCOUNTER
----- Message from Dion Garrido MD sent at 6/27/2017  2:19 PM CDT -----  Call patient with results; INR still low at 1.2; stay on lovenox; fax these results to Dr Best and call his office since they manage his coumadin

## 2017-06-29 NOTE — TELEPHONE ENCOUNTER
Call placed to Dr. Best's office at [facility] in [place] notified pt is currently being managed by Dr Garrido on Lovenox but pt prefers to have INR's checked by cardiologist and coumadin has previously been managed by dr best prior to procedure. At this time pt states he is taking 5 mg of coumadin. Per joseph she will cont pt and have him f/u with office visit for INR check and coumadin dose adjustment.

## 2017-06-29 NOTE — TELEPHONE ENCOUNTER
----- Message from Mike Mcconnell MD sent at 6/29/2017 12:03 PM CDT -----  His INR would be normal if on lovenox, until he restarts his coumadin and bridges back. I have no contraindication to this at this time. I thought he was following up with Dr Garrido this week to discuss transitioning back to coumadin    ----- Message -----  From: Jonnie Hare LPN  Sent: 6/29/2017  10:41 AM  To: Mike Mcconnell MD        ----- Message -----  From: Dimas Green RN  Sent: 6/28/2017   4:10 PM  To: Jayesh COON Staff    Please review INR

## 2017-06-30 NOTE — TELEPHONE ENCOUNTER
called Nasir's office Trav his MA or nurse said she was going to make the pt come in on Friday 6/30/17 for an appt he had not been into see him since march thou he told us that he preferred to f/u with them concerning his INR because they were closer to his house. I spoke with the pt thurs before I left to make sure that he went in for a check because the last INR he had read 1.2 and he was taking lovenox and coumadin 5mg he told me he did not go do to the weather. Franklyn's office was concerned because of his noncompliance as well.When I spoke with the pt on Thursday I made sure he was aware of that especially because he just had that procedure done. Tried to get in touch with Trav at Nasir's office this evening but was unable to just to f/u phone circuits were busy all afternoon. Tried to call pt no answer message left to rtn call.

## 2017-06-30 NOTE — TELEPHONE ENCOUNTER
6/29/17 called Nasir's office Trav CULVER /nurse said she was going to make the pt come in on Friday 6/30/17 for an appt he had not been into see him since march thou he told us that he preferred to f/u with them concerning his INR because they were closer to his house. Spoke to the pt thurs before I left to make sure that he went in for a check because the last INR he had read 1.2 and he was taking lovenox and coumadin 5mg he told me he did not go do to the weather. Franklyn's office was concerned because of his noncompliance as well.When I spoke with the pt on Thursday I made sure he was aware of that especially because he just had that procedure done. Tried to get in touch with Trav at Nasir's office this evening but was unable to just to f/u phone circuits were busy all afternoon. Tried to call pt no answer message left to rtn call.

## 2017-06-30 NOTE — TELEPHONE ENCOUNTER
Spoke with Aleja at [facility] pt was in today for appointment INR 1.7. They will adjust medication appropriately.

## 2017-07-31 ENCOUNTER — OFFICE VISIT (OUTPATIENT)
Dept: UROLOGY | Facility: CLINIC | Age: 57
End: 2017-07-31
Payer: MEDICARE

## 2017-07-31 VITALS
RESPIRATION RATE: 18 BRPM | HEIGHT: 72 IN | TEMPERATURE: 99 F | DIASTOLIC BLOOD PRESSURE: 77 MMHG | SYSTOLIC BLOOD PRESSURE: 121 MMHG | BODY MASS INDEX: 38.91 KG/M2 | WEIGHT: 287.25 LBS | HEART RATE: 65 BPM

## 2017-07-31 DIAGNOSIS — R82.998 OTHER CELLS AND CASTS IN URINE: ICD-10-CM

## 2017-07-31 DIAGNOSIS — N40.0 BENIGN PROSTATIC HYPERPLASIA WITHOUT LOWER URINARY TRACT SYMPTOMS: Primary | ICD-10-CM

## 2017-07-31 LAB
BILIRUB SERPL-MCNC: ABNORMAL MG/DL
BLOOD URINE, POC: ABNORMAL
COLOR, POC UA: YELLOW
GLUCOSE UR QL STRIP: ABNORMAL
KETONES UR QL STRIP: ABNORMAL
LEUKOCYTE ESTERASE URINE, POC: ABNORMAL
NITRITE, POC UA: ABNORMAL
PH, POC UA: 7
PROTEIN, POC: ABNORMAL
SPECIFIC GRAVITY, POC UA: 1.01
UROBILINOGEN, POC UA: ABNORMAL

## 2017-07-31 PROCEDURE — 99999 PR PBB SHADOW E&M-EST. PATIENT-LVL IV: CPT | Mod: PBBFAC,,, | Performed by: UROLOGY

## 2017-07-31 PROCEDURE — 99024 POSTOP FOLLOW-UP VISIT: CPT | Mod: ,,, | Performed by: UROLOGY

## 2017-07-31 PROCEDURE — 87086 URINE CULTURE/COLONY COUNT: CPT

## 2017-07-31 PROCEDURE — 99214 OFFICE O/P EST MOD 30 MIN: CPT | Mod: PBBFAC,PO | Performed by: UROLOGY

## 2017-07-31 PROCEDURE — 81002 URINALYSIS NONAUTO W/O SCOPE: CPT | Mod: PBBFAC,PO | Performed by: UROLOGY

## 2017-07-31 NOTE — PROGRESS NOTES
[facility] Urology Progress Note    Mo Escobar is a 57 y.o. male who presents for follow up of BPH, hematuria, retention, s/p TURP/TULVP on 6/22/17    Mr Young is a 58 yo M with hx BPH s/p TURP in 2014 but had persistent gross hematuria intermittently since, worsening lately as he is chronically anticoagulated for clotting disorder. On recent admit to [facility] cleared GH on CBI but was unable to void and had barker replaced. After removal had recurrence of raghu hematuria within one day necessitating barker replacement and irrigation. Prostate had previously been determined source of bleeding, and was again found to be on my evaluation.    PSA has also been historically elevated for his age.  Bridged with lovenox bridge for cystoscopic evaluation of his GH and prostate biopsy on 6/13/17. 92g prostate by ultrasound with cystoscopic evidence of outlet obstruction with significant regrowth of lateral lobes and very large significant abnormal hypervascular edematous median lobe with calcifications. CT urogram negative except for prostatomegaly. Biopsy pathology negative for malignancy, and only demonstrated BPH.    We discussed TURP with concurrent laser vaporization, given his chronic anticoagulation, for management of his obstructing prostate to resolve his retention and gross hematuria.   On 6/22/17 he had combination TURP/TULVP of his large erythematous obstructing prostate. Resected portion demonstrated pathologic BPH (20g).  He did pass a fill-and-pull voiding trial on 6/27/17 and has since resumed chronic anticoagulation with Coumadin.    He returns today for 1 month follow up noting he is voiding well with a good and full stream. Notes even after his last TURP he didn't have this full of a stream. He is not having to force his urine out any longer.  He did have some blood in urine a few weeks after barker removed 1 day after seeing some passage of tissue, feeling as if a scab came off. GH for almost a week, with  clots, which cleared and his urine has been clear since. He has resumed regular dose of his coumadin.  He did have some initial urgency which is improving every week and almost resoled. No leakage or incontinence. He initially had nocturia 2-3x which has decreased to 1x which is because he drinks up until bedtime.  UA trc leuk, 2+ blood, trc protein.  PVR 05cc     ROS: A comprehensive 10 system review was performed and is negative except as noted above in HPI    PHYSICAL EXAM:    Vitals:    07/31/17 1027   BP: 121/77   Pulse: 65   Resp: 18   Temp: 98.7 °F (37.1 °C)     Body mass index is 38.96 kg/m². Weight: 130.3 kg (287 lb 4.2 oz) Height: 6' (182.9 cm)       General: Alert, cooperative, no distress, appears stated age   Head: Normocephalic, without obvious abnormality, atraumatic   Eyes: PERRL, conjunctiva/corneas clear   Lungs: Respirations unlabored   Heart: Warm and well perfused   Abdomen: soft NT ND  Extremities: Extremities normal, atraumatic, no cyanosis or edema   Skin: Skin color, texture, turgor normal, no rashes or lesions   Psych: Appropriate   Neurologic: Non-focal       Recent Results (from the past 336 hour(s))   POCT URINE DIPSTICK WITHOUT MICROSCOPE    Collection Time: 07/31/17 10:29 AM   Result Value Ref Range    Color, UA yellow     Spec Grav UA 1.010     pH, UA 7.0     WBC, UA trace     Nitrite, UA neg     Protein trace     Glucose, UA norm     Ketones, UA neg     Urobilinogen, UA neg     Bilirubin neg     Blood, UA 2+        ASSESSMENT   1. Benign prostatic hyperplasia without lower urinary tract symptoms  POCT URINE DIPSTICK WITHOUT MICROSCOPE    POCT Bladder Scan     Urine culture     Plan    He is doing well s/p TURP/TULVP. He did have persistent gross hematuria for years after his initial TURP years ago and is chronically anticoagulated for bleeding disorder. He has largely had resolution of his hematuria since except for the 1 week noted above. He is voiding well without complaint, and  can discontinue his flomax at this time. RTC in 3 months for follow up. At that time if has not had recurrence of his hematuria, will stop finasteride at that time.

## 2017-08-01 LAB — BACTERIA UR CULT: NO GROWTH

## 2017-08-10 DIAGNOSIS — R79.83 HOMOCYSTEINEMIA: ICD-10-CM

## 2017-08-10 DIAGNOSIS — E56.9 VITAMIN DEFICIENCY: Primary | ICD-10-CM

## 2017-08-10 RX ORDER — FOLIC ACID-PYRIDOXINE-CYANOCOBALAMIN TAB 2.5-25-2 MG 2.5-25-2 MG
TAB ORAL
Qty: 30 TABLET | Refills: 2 | Status: SHIPPED | OUTPATIENT
Start: 2017-08-10 | End: 2017-11-17 | Stop reason: SDUPTHER

## 2017-09-06 ENCOUNTER — OFFICE VISIT (OUTPATIENT)
Dept: UROLOGY | Facility: CLINIC | Age: 57
End: 2017-09-06
Payer: MEDICARE

## 2017-09-06 ENCOUNTER — TELEPHONE (OUTPATIENT)
Dept: UROLOGY | Facility: CLINIC | Age: 57
End: 2017-09-06

## 2017-09-06 ENCOUNTER — HOSPITAL ENCOUNTER (EMERGENCY)
Facility: HOSPITAL | Age: 57
Discharge: HOME | End: 2017-09-06
Attending: EMERGENCY MEDICINE
Payer: MEDICARE

## 2017-09-06 VITALS
WEIGHT: 291.44 LBS | HEIGHT: 72 IN | BODY MASS INDEX: 39.47 KG/M2 | DIASTOLIC BLOOD PRESSURE: 72 MMHG | TEMPERATURE: 98 F | HEART RATE: 50 BPM | RESPIRATION RATE: 18 BRPM | SYSTOLIC BLOOD PRESSURE: 122 MMHG

## 2017-09-06 VITALS
HEIGHT: 72 IN | HEART RATE: 51 BPM | SYSTOLIC BLOOD PRESSURE: 136 MMHG | RESPIRATION RATE: 20 BRPM | WEIGHT: 290 LBS | DIASTOLIC BLOOD PRESSURE: 65 MMHG | OXYGEN SATURATION: 98 % | TEMPERATURE: 97 F | BODY MASS INDEX: 39.28 KG/M2

## 2017-09-06 DIAGNOSIS — N39.0 URINARY TRACT INFECTION WITH HEMATURIA, SITE UNSPECIFIED: ICD-10-CM

## 2017-09-06 DIAGNOSIS — R31.9 URINARY TRACT INFECTION WITH HEMATURIA, SITE UNSPECIFIED: ICD-10-CM

## 2017-09-06 DIAGNOSIS — D68.9 COAGULOPATHY: ICD-10-CM

## 2017-09-06 DIAGNOSIS — R31.0 GROSS HEMATURIA: Primary | ICD-10-CM

## 2017-09-06 DIAGNOSIS — R33.9 URINARY RETENTION: Primary | ICD-10-CM

## 2017-09-06 DIAGNOSIS — Z90.79 S/P TURP: ICD-10-CM

## 2017-09-06 DIAGNOSIS — T45.515A WARFARIN-INDUCED COAGULOPATHY: ICD-10-CM

## 2017-09-06 DIAGNOSIS — D68.32 WARFARIN-INDUCED COAGULOPATHY: ICD-10-CM

## 2017-09-06 DIAGNOSIS — R82.81 PYURIA: Primary | ICD-10-CM

## 2017-09-06 LAB
ANION GAP SERPL CALC-SCNC: 10 MMOL/L
BACTERIA #/AREA URNS HPF: ABNORMAL /HPF
BASOPHILS # BLD AUTO: 0 K/UL
BASOPHILS NFR BLD: 0.7 %
BILIRUB UR QL STRIP: ABNORMAL
BUN SERPL-MCNC: 12 MG/DL
CALCIUM SERPL-MCNC: 9 MG/DL
CHLORIDE SERPL-SCNC: 108 MMOL/L
CLARITY UR: ABNORMAL
CO2 SERPL-SCNC: 23 MMOL/L
COLOR UR: ABNORMAL
CREAT SERPL-MCNC: 0.9 MG/DL
DIFFERENTIAL METHOD: ABNORMAL
EOSINOPHIL # BLD AUTO: 0.2 K/UL
EOSINOPHIL NFR BLD: 3.3 %
ERYTHROCYTE [DISTWIDTH] IN BLOOD BY AUTOMATED COUNT: 18 %
EST. GFR  (AFRICAN AMERICAN): >60 ML/MIN/1.73 M^2
EST. GFR  (NON AFRICAN AMERICAN): >60 ML/MIN/1.73 M^2
GLUCOSE SERPL-MCNC: 111 MG/DL
GLUCOSE UR QL STRIP: ABNORMAL
HCT VFR BLD AUTO: 31.4 %
HGB BLD-MCNC: 10 G/DL
HGB UR QL STRIP: ABNORMAL
INR PPP: 2.1
KETONES UR QL STRIP: ABNORMAL
LEUKOCYTE ESTERASE UR QL STRIP: ABNORMAL
LYMPHOCYTES # BLD AUTO: 1.9 K/UL
LYMPHOCYTES NFR BLD: 37.8 %
MCH RBC QN AUTO: 22.2 PG
MCHC RBC AUTO-ENTMCNC: 32 G/DL
MCV RBC AUTO: 70 FL
MICROSCOPIC COMMENT: ABNORMAL
MONOCYTES # BLD AUTO: 0.4 K/UL
MONOCYTES NFR BLD: 6.9 %
NEUTROPHILS # BLD AUTO: 2.6 K/UL
NEUTROPHILS NFR BLD: 51.3 %
NITRITE UR QL STRIP: ABNORMAL
PH UR STRIP: ABNORMAL [PH] (ref 5–8)
PLATELET # BLD AUTO: 284 K/UL
PMV BLD AUTO: 8.1 FL
POTASSIUM SERPL-SCNC: 3.7 MMOL/L
PROT UR QL STRIP: ABNORMAL
PROTHROMBIN TIME: 21.6 SEC
RBC # BLD AUTO: 4.52 M/UL
RBC #/AREA URNS HPF: >100 /HPF (ref 0–4)
SODIUM SERPL-SCNC: 141 MMOL/L
SP GR UR STRIP: ABNORMAL (ref 1–1.03)
URN SPEC COLLECT METH UR: ABNORMAL
UROBILINOGEN UR STRIP-ACNC: ABNORMAL EU/DL
WBC # BLD AUTO: 5.1 K/UL
WBC #/AREA URNS HPF: 35 /HPF (ref 0–5)

## 2017-09-06 PROCEDURE — 51700 IRRIGATION OF BLADDER: CPT | Mod: PBBFAC,PO | Performed by: UROLOGY

## 2017-09-06 PROCEDURE — 96372 THER/PROPH/DIAG INJ SC/IM: CPT | Mod: PBBFAC,PO

## 2017-09-06 PROCEDURE — 85025 COMPLETE CBC W/AUTO DIFF WBC: CPT

## 2017-09-06 PROCEDURE — 99999 PR PBB SHADOW E&M-EST. PATIENT-LVL III: CPT | Mod: PBBFAC,,, | Performed by: UROLOGY

## 2017-09-06 PROCEDURE — 80048 BASIC METABOLIC PNL TOTAL CA: CPT

## 2017-09-06 PROCEDURE — 85610 PROTHROMBIN TIME: CPT

## 2017-09-06 PROCEDURE — 51700 IRRIGATION OF BLADDER: CPT | Mod: S$PBB,58,, | Performed by: UROLOGY

## 2017-09-06 PROCEDURE — 99213 OFFICE O/P EST LOW 20 MIN: CPT | Mod: PBBFAC,PO | Performed by: UROLOGY

## 2017-09-06 PROCEDURE — 99283 EMERGENCY DEPT VISIT LOW MDM: CPT | Mod: 25,27

## 2017-09-06 PROCEDURE — 81000 URINALYSIS NONAUTO W/SCOPE: CPT

## 2017-09-06 PROCEDURE — 36415 COLL VENOUS BLD VENIPUNCTURE: CPT

## 2017-09-06 PROCEDURE — 87086 URINE CULTURE/COLONY COUNT: CPT

## 2017-09-06 PROCEDURE — 25000003 PHARM REV CODE 250: Performed by: EMERGENCY MEDICINE

## 2017-09-06 PROCEDURE — 99024 POSTOP FOLLOW-UP VISIT: CPT | Mod: S$PBB,,, | Performed by: UROLOGY

## 2017-09-06 RX ORDER — HYDROCODONE BITARTRATE AND ACETAMINOPHEN 5; 325 MG/1; MG/1
1-2 TABLET ORAL EVERY 4 HOURS PRN
Qty: 20 TABLET | Refills: 0 | Status: SHIPPED | OUTPATIENT
Start: 2017-09-06 | End: 2017-09-16

## 2017-09-06 RX ORDER — GENTAMICIN SULFATE 40 MG/ML
160 INJECTION, SOLUTION INTRAMUSCULAR; INTRAVENOUS
Status: COMPLETED | OUTPATIENT
Start: 2017-09-06 | End: 2017-09-06

## 2017-09-06 RX ORDER — NITROFURANTOIN 25; 75 MG/1; MG/1
100 CAPSULE ORAL
Status: COMPLETED | OUTPATIENT
Start: 2017-09-06 | End: 2017-09-06

## 2017-09-06 RX ORDER — NITROFURANTOIN 25; 75 MG/1; MG/1
100 CAPSULE ORAL 2 TIMES DAILY
Qty: 10 CAPSULE | Refills: 0 | Status: SHIPPED | OUTPATIENT
Start: 2017-09-06 | End: 2017-09-11

## 2017-09-06 RX ADMIN — GENTAMICIN SULFATE 160 MG: 40 INJECTION, SOLUTION INTRAMUSCULAR; INTRAVENOUS at 04:09

## 2017-09-06 RX ADMIN — NITROFURANTOIN (MONOHYDRATE/MACROCRYSTALS) 100 MG: 75; 25 CAPSULE ORAL at 01:09

## 2017-09-06 NOTE — PROGRESS NOTES
[facility] Progress Note    Mo Escobar is a 57 y.o. male who presents for evaluation of gross hematuria     He has a hx BPH s/p TURP in 2014 but had persistent gross hematuria intermittently since, worsening lately as he is chronically anticoagulated for clotting disorder. On recent admit to [facility] cleared GH on CBI but was unable to void and had barker replaced. After removal had recurrence of raghu hematuria within one day necessitating barker replacement and irrigation. Prostate had previously been determined source of bleeding, and was again found to be on my evaluation. Also had PSA which had been historically elevated for his age.  Bridged with lovenox for cystoscopic evaluation of his GH and prostate biopsy on 6/13/17. 92g prostate by ultrasound with cystoscopic evidence of outlet obstruction with significant regrowth of lateral lobes and very large significant abnormal hypervascular edematous median lobe with calcifications. CT urogram negative except for prostatomegaly. Biopsy pathology negative for malignancy, and only demonstrated BPH.     We discussed TURP with concurrent laser vaporization, given his chronic anticoagulation, for management of his obstructing prostate to resolve his retention and gross hematuria.   On 6/22/17 he had combination TURP/TULVP of his large erythematous obstructing prostate. Resected portion demonstrated pathologic BPH (20g).  He did pass a fill-and-pull voiding trial on 6/27/17 and has since resumed chronic anticoagulation with Coumadin.  He returned on 7/31/17 noting he was voiding well with a good and full stream, which he didn't even have after his first TURP, and was no longer having to force his urine out. A few weeks after barker removed had a scab came off and GH for almost a week, with clots, which cleared ultimately. He did have some initial urgency postop which had nearly resolved by that point, nocturia decd to 1x, PVR 5cc.    This morning he presented to the ER  in retention with urine only dribbling out with gross hematuria with clots. A 16 fr barker was placed and he was DCed with nitrofurantoin as ua micro had 35 wbc and many bacteria. No culture sent. Patient reports no manual irrigation done in ER. Unsure of how much urine was drained on barker placement.   He does note that up until about 5 days ago he was doing very well with great stream and urinating well consistent with visit 1 month ago.  Friday (5d ago) he did use his riding mower after which he noticed some blood in his urine. Became progressively more bloody, especially after going out in a flat bottom boat 2d later, after which he started passing blood clots, to the point of inability to pass urine except dribble out and presented to ER. Has been taking his coumadin, INR 2.1 this morning  Denies fevers chills or raghu dysuria. Some L back pain but also notes chronicity from fall off ladder years prior.      ROS: A comprehensive 10 system review was performed and is negative except as noted above in HPI    PHYSICAL EXAM:    Vitals:    09/06/17 1506   BP: 122/72   Pulse: (!) 50   Resp: 18   Temp: 98.4 °F (36.9 °C)     Body mass index is 39.53 kg/m². Weight: 132.2 kg (291 lb 7.2 oz) Height: 6' (182.9 cm)       General: Alert, cooperative, no distress, appears stated age   Head: Normocephalic, without obvious abnormality, atraumatic   Eyes: PERRL, conjunctiva/corneas clear   Lungs: Respirations unlabored   Heart: Warm and well perfused   Abdomen: soft NT ND  : normal circ phallus with 16 fr barker and scant urine in bag with bloody urine draining around barker  Extremities: Extremities normal, atraumatic, no cyanosis or edema   Skin: Skin color, texture, turgor normal, no rashes or lesions   Psych: Appropriate   Neurologic: Non-focal     Bladder irrigation;  Prior to procedure, patient received 160mg IM gentamicin in case of present infection, before bladder manipulation.  16 fr barker catheter removed, and using  aseptic technique I passed a 22fr 2-way barker and inflated 10cc balloon.  Immediate return of mildly blood tinged urine. 60cc cath tip syringe with sterile water used to instill 60cc into bladder and then manually flush/irrigate with subsequent syringefuls. Irrigated clear in < one liter of manual irrigation with only punctate clots, no significant clots. Barker placed to drainage bag and stat lock on leg. Draining well, pt felt relief, tolerated well.      Recent Results (from the past 336 hour(s))   Urinalysis    Collection Time: 09/06/17  1:04 AM   Result Value Ref Range    Specimen UA Urine, Catheterized     Color, UA Red (A) Yellow, Straw, Cecy    Appearance, UA Bloody Clear    pH, UA SEE COMMENT 5.0 - 8.0    Specific Gravity, UA SEE COMMENT 1.005 - 1.030    Protein, UA SEE COMMENT Negative    Glucose, UA SEE COMMENT Negative    Ketones, UA SEE COMMENT Negative    Bilirubin (UA) SEE COMMENT Negative    Occult Blood UA SEE COMMENT Negative    Nitrite, UA SEE COMMENT Negative    Urobilinogen, UA SEE COMMENT <2.0 EU/dL    Leukocytes, UA SEE COMMENT Negative   Urinalysis Microscopic    Collection Time: 09/06/17  1:04 AM   Result Value Ref Range    RBC, UA >100 (H) 0 - 4 /hpf    WBC, UA 35 (H) 0 - 5 /hpf    Bacteria, UA Many (A) None-Occ /hpf    Microscopic Comment SEE COMMENT    Protime-INR    Collection Time: 09/06/17  1:08 AM   Result Value Ref Range    Prothrombin Time 21.6 (H) 9.0 - 12.5 sec    INR 2.1 (H) 0.8 - 1.2   Basic metabolic panel    Collection Time: 09/06/17  1:08 AM   Result Value Ref Range    Sodium 141 136 - 145 mmol/L    Potassium 3.7 3.5 - 5.1 mmol/L    Chloride 108 95 - 110 mmol/L    CO2 23 23 - 29 mmol/L    Glucose 111 (H) 70 - 110 mg/dL    BUN, Bld 12 6 - 20 mg/dL    Creatinine 0.9 0.5 - 1.4 mg/dL    Calcium 9.0 8.7 - 10.5 mg/dL    Anion Gap 10 8 - 16 mmol/L    eGFR if African American >60 >60 mL/min/1.73 m^2    eGFR if non African American >60 >60 mL/min/1.73 m^2   CBC auto differential     Collection Time: 09/06/17  1:08 AM   Result Value Ref Range    WBC 5.10 3.90 - 12.70 K/uL    RBC 4.52 (L) 4.60 - 6.20 M/uL    Hemoglobin 10.0 (L) 14.0 - 18.0 g/dL    Hematocrit 31.4 (L) 40.0 - 54.0 %    MCV 70 (L) 82 - 98 fL    MCH 22.2 (L) 27.0 - 31.0 pg    MCHC 32.0 32.0 - 36.0 g/dL    RDW 18.0 (H) 11.5 - 14.5 %    Platelets 284 150 - 350 K/uL    MPV 8.1 (L) 9.2 - 12.9 fL    Gran # 2.6 1.8 - 7.7 K/uL    Lymph # 1.9 1.0 - 4.8 K/uL    Mono # 0.4 0.3 - 1.0 K/uL    Eos # 0.2 0.0 - 0.5 K/uL    Baso # 0.00 0.00 - 0.20 K/uL    Gran% 51.3 38.0 - 73.0 %    Lymph% 37.8 18.0 - 48.0 %    Mono% 6.9 4.0 - 15.0 %    Eosinophil% 3.3 0.0 - 8.0 %    Basophil% 0.7 0.0 - 1.9 %    Differential Method Automated        ASSESSMENT   1. Gross hematuria  CANCELED: Urine culture   2. S/P TURP     3. Coagulopathy         Plan    Bladder irrigated as above. Advised he hold his coumadin tomorrow, then ok to restart following day. Should restart flomax and continue his finasteride. I had urine culture added on to his urine sample from ER. Advised to continue NF at this time pending culture result. Can return Monday 9/11/17 for voiding trial.

## 2017-09-06 NOTE — TELEPHONE ENCOUNTER
----- Message from Gregoria Hreedia sent at 9/6/2017  8:38 AM CDT -----  Contact: pt  Pt would like to come in today for ER follow have catheter in,passing blood...[phone] (home)

## 2017-09-06 NOTE — ED PROVIDER NOTES
Encounter Date: 9/6/2017    SCRIBE #1 NOTE: ITricia, am scribing for, and in the presence of, Dr Cristobal.       History     Chief Complaint   Patient presents with    Urinary Retention     Patient reports urinary retention and hematuria with left flank pain onset 5 days ago     09/06/2017  12:58 AM     Chief Complaint: Urinary Retention      Mo Escobar is a 57 y.o. male presenting to the E.D. With an acute onset of urinary retention which has been ongoing x 5 days. He notes he produces a small amount of urine of which he has noticed blood and also has complaints of left flank pain. Pain is exacerbated with movement and there are no alleviating factors. Pt recently underwent TURP procedure three months ago. Pt has a past surgical history that includes Back surgery (1989); Hand tendon surgery (1987); and Cardiac catheterization.        The history is provided by the patient and the spouse.     Review of patient's allergies indicates:  No Known Allergies  Past Medical History:   Diagnosis Date    Acute deep vein thrombosis (DVT) of popliteal vein of right lower extremity 6/27/2017    Anticoagulant long-term use     Blood clotting disorder     Dr. Garrido    Cotter catheter in place     Heterozygous MTHFR mutation [id number] 6/27/2017    Heterozygous MTHFR mutation [id number] 6/27/2017    Pulmonary embolism     2014  - Due to clotting disorder    Sleep apnea     uses c-pap    Thyroid disease      Past Surgical History:   Procedure Laterality Date    BACK SURGERY  1989    CARDIAC CATHETERIZATION      2014    HAND TENDON SURGERY  1987     Family History   Problem Relation Age of Onset    Cancer Mother     No Known Problems Father      Social History   Substance Use Topics    Smoking status: Former Smoker     Packs/day: 1.00     Years: 7.00     Types: Cigarettes     Quit date: 2012    Smokeless tobacco: Never Used    Alcohol use No     Review of Systems   Constitutional: Negative for fever.   HENT:  Negative for sore throat.    Respiratory: Negative for shortness of breath.    Cardiovascular: Negative for chest pain.   Gastrointestinal: Negative for nausea.   Genitourinary: Positive for decreased urine volume, flank pain and hematuria. Negative for dysuria.   Musculoskeletal: Negative for back pain.   Skin: Negative for rash.   Neurological: Negative for weakness.   Hematological: Does not bruise/bleed easily.       Physical Exam     Initial Vitals [09/06/17 0033]   BP Pulse Resp Temp SpO2   (!) 143/75 (!) 50 20 97.2 °F (36.2 °C) 97 %      MAP       97.67         Physical Exam    Nursing note and vitals reviewed.  Constitutional: He appears well-developed and well-nourished.   HENT:   Head: Normocephalic and atraumatic.   Mouth/Throat: Oropharynx is clear and moist.   Eyes: Conjunctivae are normal. Pupils are equal, round, and reactive to light.   Neck: Normal range of motion. Neck supple.   Cardiovascular: Normal rate, regular rhythm, normal heart sounds and intact distal pulses.   Pulmonary/Chest: No respiratory distress.   Abdominal: Soft. There is no tenderness. A hernia is present. Hernia confirmed positive in the ventral area (reducible).   Musculoskeletal: Normal range of motion.   Left lower and left thoracic Paraspinous tenderness.    Neurological: He is alert and oriented to person, place, and time.   Skin: Skin is warm and dry.   Psychiatric: He has a normal mood and affect.         ED Course   Procedures  Labs Reviewed   PROTIME-INR - Abnormal; Notable for the following:        Result Value    Prothrombin Time 21.6 (*)     INR 2.1 (*)     All other components within normal limits   URINALYSIS - Abnormal; Notable for the following:     Color, UA Red (*)     All other components within normal limits   BASIC METABOLIC PANEL - Abnormal; Notable for the following:     Glucose 111 (*)     All other components within normal limits   CBC W/ AUTO DIFFERENTIAL - Abnormal; Notable for the following:     RBC  4.52 (*)     Hemoglobin 10.0 (*)     Hematocrit 31.4 (*)     MCV 70 (*)     MCH 22.2 (*)     RDW 18.0 (*)     MPV 8.1 (*)     All other components within normal limits   URINALYSIS MICROSCOPIC - Abnormal; Notable for the following:     RBC, UA >100 (*)     WBC, UA 35 (*)     Bacteria, UA Many (*)     All other components within normal limits             Medical Decision Making:   ED Management:  Mo Escobar is a 57 y.o. male who presents with  urinary retention and hematuria.  He is chronically anticoagulated for a clotting disorder with pulmonary emboli.  INR is therapeutic at 2.  I have elected to defer discontinuation to his urologist an oncologist.  He does have significant pyuria and bacteriuria will be treated with nitrofurantoin.  Cotter catheter is placed with bloody urine removed.  Leg bag is attached.            Scribe Attestation:   Scribe #1: I performed the above scribed service and the documentation accurately describes the services I performed. I attest to the accuracy of the note.    Attending Attestation:           Physician Attestation for Scribe:  Physician Attestation Statement for Scribe #1: I, Dr Cristobal, reviewed documentation, as scribed by Tricia Estrada in my presence, and it is both accurate and complete.                 ED Course      Clinical Impression:   The primary encounter diagnosis was Urinary retention. Diagnoses of Urinary tract infection with hematuria, site unspecified and Warfarin-induced coagulopathy were also pertinent to this visit.                           Raheem Cristobal III, MD  09/06/17 0228

## 2017-09-06 NOTE — TELEPHONE ENCOUNTER
----- Message from Palmira Hwang sent at 9/6/2017 10:26 AM CDT -----  Contact: call [phone]  only  call  this   #  per pt   Calling to  Be  Fitted in ASAP  ,   Pt  Has a  catheter and  Has  Blood  Clots  In  Urine,  Pt  Stated  Left  Message  earlier // please call // placed a call to pod

## 2017-09-07 ENCOUNTER — TELEPHONE (OUTPATIENT)
Dept: UROLOGY | Facility: CLINIC | Age: 57
End: 2017-09-07

## 2017-09-07 ENCOUNTER — CLINICAL SUPPORT (OUTPATIENT)
Dept: UROLOGY | Facility: CLINIC | Age: 57
End: 2017-09-07
Payer: MEDICARE

## 2017-09-07 ENCOUNTER — HOSPITAL ENCOUNTER (OUTPATIENT)
Dept: RADIOLOGY | Facility: HOSPITAL | Age: 57
Discharge: HOME | End: 2017-09-07
Attending: UROLOGY
Payer: MEDICARE

## 2017-09-07 DIAGNOSIS — R33.8 CLOT RETENTION OF URINE: Primary | ICD-10-CM

## 2017-09-07 DIAGNOSIS — R33.8 CLOT RETENTION OF URINE: ICD-10-CM

## 2017-09-07 LAB — BACTERIA UR CULT: NO GROWTH

## 2017-09-07 PROCEDURE — 51700 IRRIGATION OF BLADDER: CPT | Mod: PBBFAC,PO | Performed by: UROLOGY

## 2017-09-07 PROCEDURE — 99024 POSTOP FOLLOW-UP VISIT: CPT | Mod: S$PBB,,, | Performed by: UROLOGY

## 2017-09-07 PROCEDURE — 74176 CT ABD & PELVIS W/O CONTRAST: CPT | Mod: TC

## 2017-09-07 PROCEDURE — 74176 CT ABD & PELVIS W/O CONTRAST: CPT | Mod: 26,,, | Performed by: RADIOLOGY

## 2017-09-07 PROCEDURE — 51700 IRRIGATION OF BLADDER: CPT | Mod: S$PBB,58,, | Performed by: UROLOGY

## 2017-09-07 NOTE — TELEPHONE ENCOUNTER
Pt came in to clinic with clotting issues and urinating around barker cath. Pt added to nurse schedule.

## 2017-09-08 ENCOUNTER — CLINICAL SUPPORT (OUTPATIENT)
Dept: UROLOGY | Facility: CLINIC | Age: 57
End: 2017-09-08
Payer: MEDICARE

## 2017-09-08 NOTE — PROGRESS NOTES
Pt returned today with complaint of continued urination around barker with clots.  Manual irrigation through barker with sterile water today returned a moderate amount of old blood clots, after which urine cleared quickly though once placed to drainage felt continued urge of needing to void and urinating around barker. 22fr barker removed, and new 22fr barker placed using aseptic technique and manual irrigation continued by instilling 60cc and irrigating with subsequent 60cc syringefuls of sterile water. One very large old organized clot +/- piece of tissue was aspirated from bladder and urine was clear. Once placing barker back to gravity drainage, pt no longer felt urge to void/bladder spasm and barker was draining well.    Appeared to have been in clot retention - likely progressive from anticoagulation plus days of increased activity with perineal pressure transmission (riding mower and flat bottom boat).  May also have element of cystitis given ua findings. Culture still pending. On NF. Got IM gent yesterday.      ASSESSMENT   1. Clot retention of urine  CT Pelvis  Without Contrast     Plan    Clot retention seems resolved today. Though as no sig clots irrigated yesterday, and aspiration of large organized clot today, want to make sure that his delayed presentation and clot retention today does not continue. Seems to be irrigated free and clear but will check noncon CT pelvis to evaluate for any clot/densities in bladder. If clot remains after 2d manual irrigation, may need cystoscopic intervention. Assuming he does well, is clot free, and urine continues draining appropriately into his leg bag, he should hold coumadin one more day and advised he can present to clinic tomorrow AM as nursing visit by 0900 for fill-and-pull voiding trial. Should remain on abx pending culture. If voiding well can then keep follow up appts as scheduled.

## 2017-09-08 NOTE — PROGRESS NOTES
Pt arrived at clinic for fill and pull. Pt has 22 FR barker cath with night bag. Cath secured with sure lock. Pt's bag was disconnected. Pt's bladder was filled with 160ml of sterile water. Barker cath removed and pt given urinal. Pt returned 160ml of clear yellow urine. jPt advised to drink plenty of fluids today to help pt to urinate. Pt to return by 3pm if he cannot urinated on his own. Pt verbalized understanding.

## 2017-09-15 PROBLEM — R60.9 EDEMA: Status: RESOLVED | Noted: 2017-09-15 | Resolved: 2017-09-15

## 2017-09-15 PROBLEM — E55.9 VITAMIN D DEFICIENCY: Status: ACTIVE | Noted: 2017-09-15

## 2017-09-15 PROBLEM — E78.5 HYPERLIPIDEMIA: Status: ACTIVE | Noted: 2017-09-15

## 2017-09-15 PROBLEM — R73.03 BORDERLINE DIABETES: Status: ACTIVE | Noted: 2017-09-15

## 2017-09-15 PROBLEM — R60.9 EDEMA: Status: ACTIVE | Noted: 2017-09-15

## 2017-10-03 ENCOUNTER — OFFICE VISIT (OUTPATIENT)
Dept: HEMATOLOGY/ONCOLOGY | Facility: CLINIC | Age: 57
End: 2017-10-03
Payer: MEDICARE

## 2017-10-03 ENCOUNTER — TELEPHONE (OUTPATIENT)
Dept: HEMATOLOGY/ONCOLOGY | Facility: CLINIC | Age: 57
End: 2017-10-03

## 2017-10-03 VITALS
SYSTOLIC BLOOD PRESSURE: 115 MMHG | RESPIRATION RATE: 18 BRPM | DIASTOLIC BLOOD PRESSURE: 70 MMHG | WEIGHT: 282.69 LBS | TEMPERATURE: 98 F | HEART RATE: 65 BPM | HEIGHT: 72 IN | BODY MASS INDEX: 38.29 KG/M2

## 2017-10-03 DIAGNOSIS — Z15.89 HETEROZYGOUS MTHFR MUTATION A1298C: ICD-10-CM

## 2017-10-03 DIAGNOSIS — I27.82 CHRONIC SADDLE PULMONARY EMBOLISM WITHOUT ACUTE COR PULMONALE: Primary | ICD-10-CM

## 2017-10-03 DIAGNOSIS — Z15.89 HETEROZYGOUS MTHFR MUTATION C677T: ICD-10-CM

## 2017-10-03 DIAGNOSIS — D50.0 ANEMIA DUE TO CHRONIC BLOOD LOSS: ICD-10-CM

## 2017-10-03 DIAGNOSIS — I26.92 CHRONIC SADDLE PULMONARY EMBOLISM WITHOUT ACUTE COR PULMONALE: Primary | ICD-10-CM

## 2017-10-03 DIAGNOSIS — D50.9 MICROCYTIC ANEMIA: ICD-10-CM

## 2017-10-03 DIAGNOSIS — I82.431 ACUTE DEEP VEIN THROMBOSIS (DVT) OF POPLITEAL VEIN OF RIGHT LOWER EXTREMITY: ICD-10-CM

## 2017-10-03 DIAGNOSIS — R31.0 GROSS HEMATURIA: ICD-10-CM

## 2017-10-03 LAB
% SATURATION: 4 %
FERRITIN SERPL-MCNC: 45 NG/ML (ref 37–201)
IRON: 19 MCG/DL (ref 32–176)
TOTAL IRON BINDING CAPACITY: 492 MCG/DL (ref 177–435)

## 2017-10-03 PROCEDURE — 99213 OFFICE O/P EST LOW 20 MIN: CPT | Mod: ,,, | Performed by: INTERNAL MEDICINE

## 2017-10-03 RX ORDER — IRON,CARBONYL/ASCORBIC ACID 100-250 MG
1 TABLET ORAL DAILY
COMMUNITY
End: 2018-01-05 | Stop reason: SDUPTHER

## 2017-10-03 NOTE — PROGRESS NOTES
[facility] Hematology/Oncology  PROGRESS NOTE      Subjective:       Patient ID:   NAME: Mo Escobar : 1960     57 y.o. male    Referring Doc:  Jose A  Other Physicians: Travis Best Pinsky    Chief Complaint:  Clot disorder    History of Present Illness:     Patient returns today for a regularly scheduled follow-up visit.  The patient is doing ok with no new issues. He has been under the care of Dr Mcconnell with  and has been doing good with the bladder issues. Only mild occasional hematuria. No excessive bleeding or bruisng. He is still on coumadin per direction of Dr Best. No CP,SOB, HA's or N/V. He just saw Dr Mcconnell on .             ROS:   GEN: normal without any fever, night sweats or weight loss  HEENT: normal with no HA's, sore throat, stiff neck, changes in vision  CV: normal with no CP, SOB, PND, HERRERA or orthopnea  PULM: normal with no SOB, cough, hemoptysis, sputum or pleuritic pain  GI: normal with no abdominal pain, nausea, vomiting, constipation, diarrhea, melanotic stools, BRBPR, or hematemesis  : normal with no hematuria, dysuria  BREAST: normal with no mass, discharge, pain  SKIN: normal with no rash, erythema, bruising, or swelling    Allergies:  Review of patient's allergies indicates:  No Known Allergies    Medications:    Current Outpatient Prescriptions:     cholecalciferol, vitamin D3, (VITAMIN D3) 5,000 unit Tab, Take 5,000 Units by mouth once daily., Disp: , Rfl:     finasteride (PROSCAR) 5 mg tablet, Take 1 tablet (5 mg total) by mouth once daily., Disp: 30 tablet, Rfl: 11    FOLBIC 2.5-25-2 mg Tab, TAKE ONE TABLET BY MOUTH ONCE DAILY, Disp: 30 tablet, Rfl: 2    levothyroxine (SYNTHROID) 112 MCG tablet, Take 112 mcg by mouth once daily., Disp: , Rfl:     lidocaine HCL 2% (XYLOCAINE) 2 % jelly, Apply topically as needed. For catheter irritation, Disp: 5 mL, Rfl: 0    warfarin (COUMADIN) 4 MG tablet, Take 5 mg by mouth Daily. , Disp: , Rfl:     PMHx/PSHx Updates:  See  patient's last visit with me on 6/27/2017.  See H&P on 8/21/2014        Pathology:  No matching staging information was found for the patient.          Objective:     Vitals:  Blood pressure 115/70, pulse 65, temperature 98.1 °F (36.7 °C), resp. rate 18, height 6' (1.829 m), weight 128.2 kg (282 lb 11.2 oz).    Physical Examination:   GEN: no apparent distress, comfortable; AAOx3  HEAD: atraumatic and normocephalic  EYES: no pallor, no icterus, PERRLA  ENT: OMM, no pharyngeal erythema, external ears WNL; no nasal discharge; no thrush  NECK: no masses, thyroid normal, trachea midline, no LAD/LN's, supple  CV: RRR with no murmur; normal pulse; normal S1 and S2; no pedal edema  CHEST: Normal respiratory effort; CTAB; normal breath sounds; no wheeze or crackles  ABDOM: nontender and nondistended; soft; normal bowel sounds; no rebound/guarding  MUSC/Skeletal: ROM normal; no crepitus; joints normal; no deformities or arthropathy  EXTREM: no clubbing, cyanosis, inflammation or swelling  SKIN: no rashes, lesions, ulcers, petechiae or subcutaneous nodules  : no barker  NEURO: grossly intact; motor/sensory WNL; AAOx3; no tremors  PSYCH: normal mood, affect and behavior  LYMPH: normal cervical, supraclavicular, axillary and groin LN's            Labs:     9/6/2017  Lab Results   Component Value Date    WBC 5.10 09/06/2017    HGB 10.0 (L) 09/06/2017    HCT 31.4 (L) 09/06/2017    MCV 70 (L) 09/06/2017     09/06/2017           Radiology/Diagnostic Studies:    Ct Abdomen Pelvis  Without Contrast    Result Date: 9/7/2017  CT Abdomen and Pelvis without contrast Comparison: 06/09/2017 Technique:  Helically acquired axial images of the abdomen and pelvis were acquired without contrast.   Reformatted coronal and sagittal images provided. FINDINGS: The liver, gallbladder, pancreas, spleen, and adrenal glands are unremarkable. There is no evidence for nephrolithiasis or hydroureteronephrosis.  A Barker catheter is present within  the urinary bladder, which appears thickwalled and with hazy surrounding fat stranding.  No visible thrombus is seen within the bladder.  The prostate gland appears enlarged.  A prominent 9 mm lymph node is present to the right atrium the bladder. A 3 mm nodule is present within the right lower lobe.  Moderate degenerative changes present at L5-S1.    1.  Decompressed ureter bladder with Cotter catheter in place without evidence for intraluminal blood clot.  The bladder wall appears thickened and with hazy stranding fat stranding suggesting cystitis. 2.  3 mm right lower lobe nodule.  In the absence of risk factors, no followup indicated.  Otherwise optional chest CT at 12 months may be performed. Electronically signed by: Liang Ceron MD Date:     09/07/17 Time:    16:19       I have reviewed all available lab results and radiology reports.    Assessment/Plan:   (1) 57 y.o. male with diagnosis of chronic clot disorder with prior bilateral pulmonary emboli  - he has underlying clot disorder with MTHFR-A and MTHFR-C heterozygous condition  - he has been on coumadin long term per direction of Dr Best with cardiology     (2) Recurrent hematuria issues - he was previously hospitalized at [facility] and was previously under the care of Dr Sanchez and is now under the care of Dr Mcconnell with   - recent cyctoscope and TURP with Dr Mcconnell; everything looked good per patient  - he last saw Dr Mcconnell on 9/7  - minor hematuria residual     (3) Recurrent clot event with DVT in the right popliteal vein  - he has been on coumadin per direction of Dr Best     (4) Pulmonary HTN hx - followed by Dr Robles with Pulmonary     (5) Hx/of gout issues    (6) residual microcytic anemia - will check iron panel; start ICAR-C       PLAN:  1. Check labs monthly  2. Check iron panel  3. Start ICAR-c  4. F/u with PCP,  and Card  5. Will take over the coumadin management per patient's request  RTC in 3-4 months  Fax note to Jayesh Naranjo  mily Best    Discussion:     I have explained all of the above in detail and the patient understands all of the current recommendation(s). I have answered all of their questions to the best of my ability and to their complete satisfaction.   The patient is to continue with the current management plan.            Electronically signed by Dion Garrido MD

## 2017-10-03 NOTE — TELEPHONE ENCOUNTER
ICAR c script called into [facility] pharmacy on [address] in [place] [phone] to message line. Office phone number left if there are any issues with filling script.

## 2017-10-03 NOTE — TELEPHONE ENCOUNTER
----- Message from Renate Oseguera sent at 10/3/2017  2:39 PM CDT -----  Patient stated that the pharmacy never received his Iron RX that was sent at today's appt. Please call into [facility] on [address] in [place] at [phone]. Please call patient once complete at [phone]. Thanks

## 2017-10-09 NOTE — LETTER
July 31, 2017        Edwin Saldivar MD  [address] [place] [address]             [facility]  [address]  [facility] [address]  Phone: [phone]   Patient: Mo Escobar   MR Number: [id number]   YOB: 1960   Date of Visit: 7/31/2017       Dear Dr. Saldivar:    I had the pleasure of seeing your patient Mo Escobar in postop follow up after his recent TURP with laser vaporization of prostate on 6/22/17. Attached you will find relevant portions of my assessment and plan of care. He is doing very well.    If you have questions, please do not hesitate to call me. I look forward to following Mo Escobar along with you.    Sincerely,        Mike Mcconnell MD            CC  No Recipients    Enclosure         
No complaints

## 2017-10-11 ENCOUNTER — TELEPHONE (OUTPATIENT)
Dept: HEMATOLOGY/ONCOLOGY | Facility: CLINIC | Age: 57
End: 2017-10-11

## 2017-10-11 NOTE — TELEPHONE ENCOUNTER
Spoke to patient about taking the daily Icar C as ordered. He will recheck his labs in 1 month. I explained to him that based on those results,  may do something different or have him continue the daily Icar C. He voiced undertanding

## 2017-10-12 DIAGNOSIS — D68.9 CLOTTING DISORDER: Primary | ICD-10-CM

## 2017-10-12 LAB
ALBUMIN SERPL-MCNC: 4.3 G/DL (ref 3.6–5.1)
ALBUMIN/GLOB SERPL: 1.5 (CALC) (ref 1–2.5)
ALP SERPL-CCNC: 55 U/L (ref 40–115)
ALT SERPL-CCNC: 21 U/L (ref 9–46)
AST SERPL-CCNC: 15 U/L (ref 10–35)
BASOPHILS # BLD AUTO: 20 CELLS/UL (ref 0–200)
BASOPHILS NFR BLD AUTO: 0.4 %
BILIRUB SERPL-MCNC: 0.3 MG/DL (ref 0.2–1.2)
BUN SERPL-MCNC: 16 MG/DL (ref 7–25)
BUN/CREAT SERPL: ABNORMAL (CALC) (ref 6–22)
CALCIUM SERPL-MCNC: 9.8 MG/DL (ref 8.6–10.3)
CHLORIDE SERPL-SCNC: 105 MMOL/L (ref 98–110)
CO2 SERPL-SCNC: 27 MMOL/L (ref 20–31)
CREAT SERPL-MCNC: 0.98 MG/DL (ref 0.7–1.33)
EOSINOPHIL # BLD AUTO: 100 CELLS/UL (ref 15–500)
EOSINOPHIL NFR BLD AUTO: 2 %
ERYTHROCYTE [DISTWIDTH] IN BLOOD BY AUTOMATED COUNT: 19.2 % (ref 11–15)
GFR SERPL CREATININE-BSD FRML MDRD: 85 ML/MIN/1.73M2
GLOBULIN SER CALC-MCNC: 2.9 G/DL (CALC) (ref 1.9–3.7)
GLUCOSE SERPL-MCNC: 105 MG/DL (ref 65–99)
HCT VFR BLD AUTO: 37 % (ref 38.5–50)
HGB BLD-MCNC: 11.6 G/DL (ref 13.2–17.1)
INR PPP: 1.6
LYMPHOCYTES # BLD AUTO: 1600 CELLS/UL (ref 850–3900)
LYMPHOCYTES NFR BLD AUTO: 32 %
MCH RBC QN AUTO: 22.7 PG (ref 27–33)
MCHC RBC AUTO-ENTMCNC: 31.4 G/DL (ref 32–36)
MCV RBC AUTO: 72.3 FL (ref 80–100)
MONOCYTES # BLD AUTO: 310 CELLS/UL (ref 200–950)
MONOCYTES NFR BLD AUTO: 6.2 %
NEUTROPHILS # BLD AUTO: 2970 CELLS/UL (ref 1500–7800)
NEUTROPHILS NFR BLD AUTO: 59.4 %
PLATELET # BLD AUTO: 331 THOUSAND/UL (ref 140–400)
PMV BLD REES-ECKER: 9.9 FL (ref 7.5–12.5)
POTASSIUM SERPL-SCNC: 4.5 MMOL/L (ref 3.5–5.3)
PROT SERPL-MCNC: 7.2 G/DL (ref 6.1–8.1)
PROTHROMBIN TIME: 17 SEC (ref 9–11.5)
RBC # BLD AUTO: 5.12 MILLION/UL (ref 4.2–5.8)
SODIUM SERPL-SCNC: 140 MMOL/L (ref 135–146)
WBC # BLD AUTO: 5 THOUSAND/UL (ref 3.8–10.8)

## 2017-10-12 RX ORDER — WARFARIN 1 MG/1
1 TABLET ORAL DAILY
COMMUNITY
End: 2017-10-12 | Stop reason: SDUPTHER

## 2017-10-12 RX ORDER — WARFARIN 1 MG/1
1 TABLET ORAL DAILY
Qty: 30 TABLET | Refills: 1 | Status: SHIPPED | OUTPATIENT
Start: 2017-10-12 | End: 2017-10-30

## 2017-10-12 NOTE — TELEPHONE ENCOUNTER
----- Message from Dion Garrido MD sent at 10/12/2017  2:45 PM CDT -----  He needs to increase his coumadin - by 1mg then recheck INR next week

## 2017-10-19 ENCOUNTER — TELEPHONE (OUTPATIENT)
Dept: HEMATOLOGY/ONCOLOGY | Facility: CLINIC | Age: 57
End: 2017-10-19

## 2017-10-19 LAB
INR PPP: 2.1
PROTHROMBIN TIME: 21.9 SEC (ref 9–11.5)

## 2017-10-19 NOTE — TELEPHONE ENCOUNTER
----- Message from Dion Garrido MD sent at 10/19/2017 10:17 AM CDT -----  Level looks good - recheck in 4 weeks

## 2017-10-28 NOTE — PROGRESS NOTES
[facility] Urology Progress Note    Mo Escobar is a 57 y.o. male who presents for follow up of BPH/gross hematuria     He has a hx BPH s/p TURP in 2014 but had persistent gross hematuria intermittently ever since, which worsened earlier this year as he is chronically anticoagulated for clotting disorder. On recent admit to [facility] cleared GH on CBI but was unable to void and had barker replaced. After removal had recurrence of raghu hematuria within one day necessitating barker replacement and irrigation. Prostate had previously been determined source of bleeding, and was again found to be on my evaluation. Also had PSA which had been historically elevated for his age.    Bridged with lovenox for cystoscopic evaluation of his GH and prostate biopsy on 6/13/17. 92g prostate by ultrasound with cystoscopic evidence of outlet obstruction with significant regrowth of lateral lobes and very large significant abnormal hypervascular edematous median lobe with calcifications. CT urogram negative except for prostatomegaly. Biopsy pathology negative for malignancy, and only demonstrated BPH.      On 6/22/17 he had combination TURP/TULVP of his large erythematous obstructing prostate. Resected portion demonstrated pathologic BPH (20g).  He did pass a fill-and-pull voiding trial on 6/27/17 and resumed chronic anticoagulation with Coumadin.  He returned on 7/31/17 voiding well with a good and full stream, which he didn't even have after his first TURP, and was no longer having to force his urine out.   A few weeks after barker removed had a scab came off and GH for almost a week, with clots, which cleared ultimately. He did have some initial urgency postop which had nearly resolved by that point, nocturia decd to 1x, PVR 5cc.     On 9/6/17 he presented to the ER in retention with urine only dribbling out with gross hematuria with clots. A 16 fr barker was placed and he was DCed with nitrofurantoin as ua micro had 35 wbc and many  bacteria. No culture sent. Patient reports no manual irrigation done in ER. Unsure of how much urine was drained on barker placement.   He does note that up until about 5 days prior he was doing very well with great stream and urinating well, though 5d prior he did use his riding mower after which he noticed gross hematuria, which became progressively worse, especially after ride in a flat bottom boat 2d later until clot passage and ultimately clot retention. Manually irrigated clear through 22fr barker with <1L and only mild amount clots, so restarted flomax/finasteride. He did return next day, 9/7/17 in clot retention of barker which moderate amount old clot irrigated out, and then after one very large old organized clot +/- piece of tissue was aspirated from bladder, urine was clear. CT pelvis after showed no residual hyperdense material or clot in bladder, and urine remained clear for 24 hours so barker removed on 9/8/17 after passing fill and pull voiding trial.    He returns today noting that he has been urinating well without any issues  Has not had any interim gross hematuria since last visit  Was on proscar to control prostatic source bleeding but actually ran out of proscar 2 weeks ago, and no issues since.  No urinary hesitancy, intermittency. Notes good flow/stream and feels like he empties his bladder.  First time in years he hasnt had voiding/prostate issues and has been free of hematuria    Udip trc prot, 2+ blood, though grossly yellow  PVR by bladder scan is 31cc        ROS: A comprehensive 10 system review was performed and is negative except as noted above in HPI    PHYSICAL EXAM:    Vitals:    10/30/17 0847   BP: 138/75   Pulse: (!) 47   Resp: 18   Temp: 97.6 °F (36.4 °C)     Body mass index is 39.3 kg/m². Weight: 131.4 kg (289 lb 12.7 oz) Height: 6' (182.9 cm)       General: Alert, cooperative, no distress, appears stated age   Head: Normocephalic, without obvious abnormality, atraumatic   Eyes:  PERRL, conjunctiva/corneas clear   Lungs: Respirations unlabored   Heart: Warm and well perfused   Abdomen: soft NT ND  Extremities: Extremities normal, atraumatic, no cyanosis or edema   Skin: Skin color, texture, turgor normal, no rashes or lesions   Psych: Appropriate   Neurologic: Non-focal       Recent Results (from the past 336 hour(s))   Protime-INR    Collection Time: 10/18/17  9:05 AM   Result Value Ref Range    INR 2.1 (H)     Prothrombin Time 21.9 (H) 9.0 - 11.5 sec       ASSESSMENT   1. Benign prostatic hyperplasia without lower urinary tract symptoms     2. S/P TURP     3. History of gross hematuria         Plan    Doing very well now 4+ months s/p TURP  He is now experiencing complete resolution of lower urinary tract symptoms, voiding and emptying well, and his hematuria has resolved for the first time in years.  Given significant prostatic enlargement, irregularity, and edema in setting of chronic anticoagulation what may have been normal intermittent GH in immediate postop period did present as clot retention. Prostatic bleeding controlled with both time and finasteride, which he has since discontinued and has had no recurrence of GH. Advised he can hold finasteride at this time and therefore be off all  meds, and is voiding well and pleased with his symptoms.  At this time he can return in 6 months for reeval of LUTS, and prn in the interim for gross hematuria or voiding difficulty.

## 2017-10-30 ENCOUNTER — OFFICE VISIT (OUTPATIENT)
Dept: UROLOGY | Facility: CLINIC | Age: 57
End: 2017-10-30
Payer: MEDICARE

## 2017-10-30 VITALS
TEMPERATURE: 98 F | RESPIRATION RATE: 18 BRPM | DIASTOLIC BLOOD PRESSURE: 75 MMHG | HEART RATE: 47 BPM | BODY MASS INDEX: 39.25 KG/M2 | SYSTOLIC BLOOD PRESSURE: 138 MMHG | WEIGHT: 289.81 LBS | HEIGHT: 72 IN

## 2017-10-30 DIAGNOSIS — Z90.79 S/P TURP: ICD-10-CM

## 2017-10-30 DIAGNOSIS — Z87.898 HISTORY OF GROSS HEMATURIA: ICD-10-CM

## 2017-10-30 DIAGNOSIS — N40.0 BENIGN PROSTATIC HYPERPLASIA WITHOUT LOWER URINARY TRACT SYMPTOMS: Primary | ICD-10-CM

## 2017-10-30 PROCEDURE — 99213 OFFICE O/P EST LOW 20 MIN: CPT | Mod: S$PBB,,, | Performed by: UROLOGY

## 2017-10-30 PROCEDURE — 99213 OFFICE O/P EST LOW 20 MIN: CPT | Mod: PBBFAC,PN | Performed by: UROLOGY

## 2017-10-30 PROCEDURE — 99999 PR PBB SHADOW E&M-EST. PATIENT-LVL III: CPT | Mod: PBBFAC,,, | Performed by: UROLOGY

## 2017-10-30 RX ORDER — MELOXICAM 15 MG/1
15 TABLET ORAL DAILY
COMMUNITY
End: 2018-11-05 | Stop reason: ALTCHOICE

## 2017-10-30 RX ORDER — WARFARIN 6 MG/1
6 TABLET ORAL DAILY
COMMUNITY
End: 2019-05-06 | Stop reason: DRUGHIGH

## 2017-10-30 RX ORDER — CYCLOBENZAPRINE HCL 10 MG
10 TABLET ORAL 3 TIMES DAILY PRN
COMMUNITY
End: 2018-04-30

## 2017-10-30 RX ORDER — HYDROCODONE BITARTRATE AND ACETAMINOPHEN 10; 325 MG/1; MG/1
TABLET ORAL
COMMUNITY
End: 2018-01-24

## 2017-10-30 NOTE — LETTER
October 30, 2017        Edwin Saldivar MD  [address] [place] [address]             [facility]  [address] [place] [address] [address]. [place] [address]  Phone: [phone]  Fax: [phone]   Patient: Mo Escobar   MR Number: [id number]   YOB: 1960   Date of Visit: 10/30/2017       Dear Dr. Saldivar:    I had the pleasure of seeing your patient Mo Escobar today in follow up. Attached you will find relevant portions of my assessment and plan of care. After many years of urinary difficulty and gross hematuria, he is finally asymptomatic and free of any gross hematuria.     He is doing well and pleased with his postop result, and at this time I can keep regular 6 month follow up unless he has any interim voiding issues.  If you have questions, please do not hesitate to call me. I look forward to following Mo Escobar along with you. As always, please do not hesitate to contact me for the urologic needs of any of your patients.    Sincerely,        Mike Mcconnell MD            CC  No Recipients    Enclosure

## 2017-11-01 ENCOUNTER — TELEPHONE (OUTPATIENT)
Dept: HEMATOLOGY/ONCOLOGY | Facility: CLINIC | Age: 57
End: 2017-11-01

## 2017-11-01 DIAGNOSIS — D50.9 MICROCYTIC ANEMIA: Primary | ICD-10-CM

## 2017-11-08 ENCOUNTER — TELEPHONE (OUTPATIENT)
Dept: HEMATOLOGY/ONCOLOGY | Facility: CLINIC | Age: 57
End: 2017-11-08

## 2017-11-08 LAB
ALBUMIN SERPL-MCNC: 4.4 G/DL (ref 3.6–5.1)
ALBUMIN/GLOB SERPL: 1.8 (CALC) (ref 1–2.5)
ALP SERPL-CCNC: 48 U/L (ref 40–115)
ALT SERPL-CCNC: 17 U/L (ref 9–46)
AST SERPL-CCNC: 13 U/L (ref 10–35)
BASOPHILS # BLD AUTO: 31 CELLS/UL (ref 0–200)
BASOPHILS NFR BLD AUTO: 0.8 %
BILIRUB SERPL-MCNC: 0.5 MG/DL (ref 0.2–1.2)
BUN SERPL-MCNC: 12 MG/DL (ref 7–25)
BUN/CREAT SERPL: ABNORMAL (CALC) (ref 6–22)
CALCIUM SERPL-MCNC: 9.8 MG/DL (ref 8.6–10.3)
CHLORIDE SERPL-SCNC: 105 MMOL/L (ref 98–110)
CO2 SERPL-SCNC: 29 MMOL/L (ref 20–31)
CREAT SERPL-MCNC: 0.87 MG/DL (ref 0.7–1.33)
EOSINOPHIL # BLD AUTO: 133 CELLS/UL (ref 15–500)
EOSINOPHIL NFR BLD AUTO: 3.4 %
ERYTHROCYTE [DISTWIDTH] IN BLOOD BY AUTOMATED COUNT: 20.5 % (ref 11–15)
FERRITIN SERPL-MCNC: 22 NG/ML (ref 20–380)
GFR SERPL CREATININE-BSD FRML MDRD: 96 ML/MIN/1.73M2
GLOBULIN SER CALC-MCNC: 2.5 G/DL (CALC) (ref 1.9–3.7)
GLUCOSE SERPL-MCNC: 106 MG/DL (ref 65–99)
HCT VFR BLD AUTO: 43.5 % (ref 38.5–50)
HGB BLD-MCNC: 13.3 G/DL (ref 13.2–17.1)
INR PPP: 2.3
IRON SATN MFR SERPL: 20 % (CALC) (ref 15–60)
IRON SERPL-MCNC: 91 MCG/DL (ref 50–180)
LYMPHOCYTES # BLD AUTO: 1435 CELLS/UL (ref 850–3900)
LYMPHOCYTES NFR BLD AUTO: 36.8 %
MCH RBC QN AUTO: 23.5 PG (ref 27–33)
MCHC RBC AUTO-ENTMCNC: 30.6 G/DL (ref 32–36)
MCV RBC AUTO: 77 FL (ref 80–100)
MONOCYTES # BLD AUTO: 234 CELLS/UL (ref 200–950)
MONOCYTES NFR BLD AUTO: 6 %
NEUTROPHILS # BLD AUTO: 2067 CELLS/UL (ref 1500–7800)
NEUTROPHILS NFR BLD AUTO: 53 %
PLATELET # BLD AUTO: 251 THOUSAND/UL (ref 140–400)
PMV BLD REES-ECKER: 10.4 FL (ref 7.5–12.5)
POTASSIUM SERPL-SCNC: 4.1 MMOL/L (ref 3.5–5.3)
PROT SERPL-MCNC: 6.9 G/DL (ref 6.1–8.1)
PROTHROMBIN TIME: 23.1 SEC (ref 9–11.5)
RBC # BLD AUTO: 5.65 MILLION/UL (ref 4.2–5.8)
SODIUM SERPL-SCNC: 140 MMOL/L (ref 135–146)
TIBC SERPL-MCNC: 451 MCG/DL (CALC) (ref 250–425)
WBC # BLD AUTO: 3.9 THOUSAND/UL (ref 3.8–10.8)

## 2017-11-08 NOTE — TELEPHONE ENCOUNTER
Patient notified INR 2.3 to continue on current dose. He verbalized understanding and will continue to have monthly INR checks.

## 2017-11-17 DIAGNOSIS — E56.9 VITAMIN DEFICIENCY: ICD-10-CM

## 2017-11-17 DIAGNOSIS — R79.83 HOMOCYSTEINEMIA: ICD-10-CM

## 2017-11-17 RX ORDER — FOLIC ACID-PYRIDOXINE-CYANOCOBALAMIN TAB 2.5-25-2 MG 2.5-25-2 MG
TAB ORAL
Qty: 30 TABLET | Refills: 1 | Status: SHIPPED | OUTPATIENT
Start: 2017-11-17 | End: 2018-03-21 | Stop reason: SDUPTHER

## 2017-11-20 ENCOUNTER — TELEPHONE (OUTPATIENT)
Dept: UROLOGY | Facility: CLINIC | Age: 57
End: 2017-11-20

## 2017-11-20 NOTE — TELEPHONE ENCOUNTER
Returned pt's call . Pt stated he started with some bleeding in his urine. Pt stated he started last week on wed 11/15/17, slight tinge of blood to urine. Pt stated on 11/17/17 pt urinated a large clot (old,dark colored) in the afternoon with no bleeding associated with it, also on Saturday and Sunday/1 clot only each time. Today pt urinated dark brown blood with no clots. Pt denies fever/pain. Pt has not been drinking water as instructed in the pass. Pt states he has been drinking rootbeer. Pt has not done anything strenuous or rode on any riding equipment lately (riding lawnmower approx 2 weeks ago). Pt states he would like to drink plenty of water today and call back tomorrow to notify Dr Mcconnell on status of urine. Pt also notified to go to the ER if any problems with urination or heavy bright red bleeding occurs. Dr Woo to be advised.

## 2017-11-20 NOTE — TELEPHONE ENCOUNTER
----- Message from Nicole Shi sent at 11/20/2017  2:50 PM CST -----  Contact: self  Patient [phone] is calling to speak with Nurse to advise that he is urinating blood/please call patient to discuss

## 2017-11-21 ENCOUNTER — TELEPHONE (OUTPATIENT)
Dept: UROLOGY | Facility: CLINIC | Age: 57
End: 2017-11-21

## 2017-11-21 NOTE — TELEPHONE ENCOUNTER
----- Message from Karli Pedroza sent at 11/21/2017  8:51 AM CST -----  Contact: anibal  Blood in urine, wants call back   Call back

## 2017-11-21 NOTE — TELEPHONE ENCOUNTER
Spoke with pt. Pt stated light tinge to urine, one small flake of clot.  Pt stated he has been drinking water all afternoon yesterday and this morning. Will continue today. Pt is without a vehicle today but could come in tomorrow morning if need be for urine dip/ucx. Dr Mcconnell not in the office tomorrow. Dr Mcconnell advised if pt's urine does not clear and still has any signs of clots pt to go to [facility] ER.

## 2017-11-22 ENCOUNTER — TELEPHONE (OUTPATIENT)
Dept: UROLOGY | Facility: CLINIC | Age: 57
End: 2017-11-22

## 2017-11-22 NOTE — TELEPHONE ENCOUNTER
----- Message from Ximena Barrett sent at 11/22/2017 12:33 PM CST -----  Please call [phone]  Has blood in urine / very concerned

## 2017-11-22 NOTE — TELEPHONE ENCOUNTER
Spoke with Pt. Pt was calling to notify Dr Mcconnell that his urine cleared up and no clots noted. Pt advised to continue drinking water during the day. Pt advised to go to the ER if he experiences bleeding/clots and if after drinking water it doesn't clear or if pt stops urinating.  Pt verbalized understanding. Dr Mcconnell to be advised.

## 2017-12-20 ENCOUNTER — TELEPHONE (OUTPATIENT)
Dept: HEMATOLOGY/ONCOLOGY | Facility: CLINIC | Age: 57
End: 2017-12-20

## 2017-12-20 NOTE — TELEPHONE ENCOUNTER
Returned patients call as he was requesting lab resultsfrom 2 weeks ago. After talking to him what he was really calling about was INR level. This lab was not done as ordered at [facility]. Level has not been checked in about 6 weeks. He is currently on 6mg daily and is almost out of medication. He goes to [facility] for labs. Due to the holidays he is going to get INR checked either on Saturday morning or Tuesday morning after [place]. I called in to his pharmacy enough coumadin medication to last him until next week only as I explained that a new INR must be done for accurate dosing of the coumadin.He voiced understanding.

## 2018-01-05 DIAGNOSIS — D50.0 IRON DEFICIENCY ANEMIA DUE TO CHRONIC BLOOD LOSS: Primary | ICD-10-CM

## 2018-01-05 NOTE — TELEPHONE ENCOUNTER
----- Message from Senait Fabian sent at 1/5/2018  9:17 AM CST -----  Pt called in had labs done at [facility] in [place] few days ago would like a call back at [phone] with results.

## 2018-01-05 NOTE — TELEPHONE ENCOUNTER
Patient notified of lab results from 2018; however, PT/INR not done. Patient stated he asked them a couple of times about the INR and was told they were drawing it. He will go over on Monday and have it redrawn. The lab orders  so renewed in the system which included INR

## 2018-01-08 RX ORDER — IRON,CARBONYL/ASCORBIC ACID 100-250 MG
TABLET ORAL
Qty: 30 TABLET | Refills: 1 | Status: SHIPPED | OUTPATIENT
Start: 2018-01-08 | End: 2018-03-09 | Stop reason: SDUPTHER

## 2018-01-09 ENCOUNTER — TELEPHONE (OUTPATIENT)
Dept: HEMATOLOGY/ONCOLOGY | Facility: CLINIC | Age: 58
End: 2018-01-09

## 2018-01-09 LAB
ALBUMIN SERPL-MCNC: 4.4 G/DL (ref 3.6–5.1)
ALBUMIN/GLOB SERPL: 1.8 (CALC) (ref 1–2.5)
ALP SERPL-CCNC: 46 U/L (ref 40–115)
ALT SERPL-CCNC: 17 U/L (ref 9–46)
AST SERPL-CCNC: 13 U/L (ref 10–35)
BASOPHILS # BLD AUTO: 19 CELLS/UL (ref 0–200)
BASOPHILS NFR BLD AUTO: 0.4 %
BILIRUB SERPL-MCNC: 0.4 MG/DL (ref 0.2–1.2)
BUN SERPL-MCNC: 17 MG/DL (ref 7–25)
BUN/CREAT SERPL: ABNORMAL (CALC) (ref 6–22)
CALCIUM SERPL-MCNC: 9.4 MG/DL (ref 8.6–10.3)
CHLORIDE SERPL-SCNC: 107 MMOL/L (ref 98–110)
CO2 SERPL-SCNC: 27 MMOL/L (ref 20–31)
CREAT SERPL-MCNC: 0.9 MG/DL (ref 0.7–1.33)
EOSINOPHIL # BLD AUTO: 221 CELLS/UL (ref 15–500)
EOSINOPHIL NFR BLD AUTO: 4.6 %
ERYTHROCYTE [DISTWIDTH] IN BLOOD BY AUTOMATED COUNT: 17.6 % (ref 11–15)
GFR SERPL CREATININE-BSD FRML MDRD: 94 ML/MIN/1.73M2
GLOBULIN SER CALC-MCNC: 2.5 G/DL (CALC) (ref 1.9–3.7)
GLUCOSE SERPL-MCNC: 114 MG/DL (ref 65–99)
HCT VFR BLD AUTO: 41.7 % (ref 38.5–50)
HGB BLD-MCNC: 13.8 G/DL (ref 13.2–17.1)
INR PPP: 2.1
LYMPHOCYTES # BLD AUTO: 1392 CELLS/UL (ref 850–3900)
LYMPHOCYTES NFR BLD AUTO: 29 %
MCH RBC QN AUTO: 26.4 PG (ref 27–33)
MCHC RBC AUTO-ENTMCNC: 33.1 G/DL (ref 32–36)
MCV RBC AUTO: 79.7 FL (ref 80–100)
MONOCYTES # BLD AUTO: 293 CELLS/UL (ref 200–950)
MONOCYTES NFR BLD AUTO: 6.1 %
NEUTROPHILS # BLD AUTO: 2875 CELLS/UL (ref 1500–7800)
NEUTROPHILS NFR BLD AUTO: 59.9 %
PLATELET # BLD AUTO: 261 THOUSAND/UL (ref 140–400)
PMV BLD REES-ECKER: 10.7 FL (ref 7.5–12.5)
POTASSIUM SERPL-SCNC: 4.7 MMOL/L (ref 3.5–5.3)
PROT SERPL-MCNC: 6.9 G/DL (ref 6.1–8.1)
PROTHROMBIN TIME: 21.7 SEC (ref 9–11.5)
RBC # BLD AUTO: 5.23 MILLION/UL (ref 4.2–5.8)
SODIUM SERPL-SCNC: 142 MMOL/L (ref 135–146)
WBC # BLD AUTO: 4.8 THOUSAND/UL (ref 3.8–10.8)

## 2018-01-09 NOTE — TELEPHONE ENCOUNTER
----- Message from Dion Garrido MD sent at 1/9/2018  9:47 AM CST -----  INR good - recheck in 4 weeks

## 2018-01-09 NOTE — TELEPHONE ENCOUNTER
Patient notified INR good at 2.1 to continue his Coumadin at 6mg daily and have a follow up INR check. Called a refill for his coumadin 4mg #60 (take as directed) to [facility].

## 2018-01-10 ENCOUNTER — TELEPHONE (OUTPATIENT)
Dept: HEMATOLOGY/ONCOLOGY | Facility: CLINIC | Age: 58
End: 2018-01-10

## 2018-01-24 ENCOUNTER — OFFICE VISIT (OUTPATIENT)
Dept: HEMATOLOGY/ONCOLOGY | Facility: CLINIC | Age: 58
End: 2018-01-24
Payer: MEDICARE

## 2018-01-24 VITALS
RESPIRATION RATE: 18 BRPM | HEART RATE: 49 BPM | DIASTOLIC BLOOD PRESSURE: 78 MMHG | TEMPERATURE: 98 F | SYSTOLIC BLOOD PRESSURE: 131 MMHG | HEIGHT: 72 IN | WEIGHT: 285.69 LBS | BODY MASS INDEX: 38.7 KG/M2

## 2018-01-24 DIAGNOSIS — I27.82 CHRONIC SADDLE PULMONARY EMBOLISM WITHOUT ACUTE COR PULMONALE: ICD-10-CM

## 2018-01-24 DIAGNOSIS — I82.431 ACUTE DEEP VEIN THROMBOSIS (DVT) OF POPLITEAL VEIN OF RIGHT LOWER EXTREMITY: ICD-10-CM

## 2018-01-24 DIAGNOSIS — D50.0 ANEMIA DUE TO CHRONIC BLOOD LOSS: ICD-10-CM

## 2018-01-24 DIAGNOSIS — Z15.89 HETEROZYGOUS MTHFR MUTATION C677T: ICD-10-CM

## 2018-01-24 DIAGNOSIS — D50.9 MICROCYTIC ANEMIA: Primary | ICD-10-CM

## 2018-01-24 DIAGNOSIS — Z15.89 HETEROZYGOUS MTHFR MUTATION A1298C: ICD-10-CM

## 2018-01-24 DIAGNOSIS — I26.92 CHRONIC SADDLE PULMONARY EMBOLISM WITHOUT ACUTE COR PULMONALE: ICD-10-CM

## 2018-01-24 PROCEDURE — 99213 OFFICE O/P EST LOW 20 MIN: CPT | Mod: ,,, | Performed by: INTERNAL MEDICINE

## 2018-01-24 NOTE — PROGRESS NOTES
[facility] Hematology/Oncology  PROGRESS NOTE      Subjective:       Patient ID:   NAME: Mo Escobar : 1960     57 y.o. male    Referring Doc:  Jose A  Other Physicians: Travis Best Pinsky    Chief Complaint:  Clot disorder    History of Present Illness:     Patient returns today for a regularly scheduled follow-up visit.  The patient is doing ok with no new issues. He has been under the care of Dr Mcconnell with  and has been doing good with the bladder issues. No excessive bleeding or bruising. He is still on coumadin per our direction. No CP,SOB, HA's or N/V. Occasional hematuria but intermittent and mild. He sees Dr Mcconnell in near future.           ROS:   GEN: normal without any fever, night sweats or weight loss  HEENT: normal with no HA's, sore throat, stiff neck, changes in vision  CV: normal with no CP, SOB, PND, HERRERA or orthopnea  PULM: normal with no SOB, cough, hemoptysis, sputum or pleuritic pain  GI: normal with no abdominal pain, nausea, vomiting, constipation, diarrhea, melanotic stools, BRBPR, or hematemesis  : normal with no hematuria, dysuria  BREAST: normal with no mass, discharge, pain  SKIN: normal with no rash, erythema, bruising, or swelling    Allergies:  Review of patient's allergies indicates:  No Known Allergies    Medications:    Current Outpatient Prescriptions:     cholecalciferol, vitamin D3, (VITAMIN D3) 5,000 unit Tab, Take 5,000 Units by mouth once daily., Disp: , Rfl:     cyclobenzaprine (FLEXERIL) 10 MG tablet, Take 10 mg by mouth 3 (three) times daily as needed for Muscle spasms., Disp: , Rfl:     FOLBIC 2.5-25-2 mg Tab, TAKE ONE TABLET BY MOUTH ONCE DAILY, Disp: 30 tablet, Rfl: 1    iron-vitamin C 100-250 mg, ICAR-C, 100-250 mg Tab, TAKE ONE TABLET BY MOUTH ONCE DAILY, Disp: 30 tablet, Rfl: 1    levothyroxine (SYNTHROID) 112 MCG tablet, Take 112 mcg by mouth once daily., Disp: , Rfl:     meloxicam (MOBIC) 15 MG tablet, Take 15 mg by mouth once daily., Disp: ,  Rfl:     warfarin (COUMADIN) 6 MG tablet, Take 6 mg by mouth once daily., Disp: , Rfl:     PMHx/PSHx Updates:  See patient's last visit with me on 10/3/2017.  See H&P on 8/21/2014        Pathology:  No matching staging information was found for the patient.          Objective:     Vitals:  Blood pressure 131/78, pulse (!) 49, temperature 98 °F (36.7 °C), resp. rate 18, height 6' (1.829 m), weight 129.6 kg (285 lb 11.2 oz).    Physical Examination:   GEN: no apparent distress, comfortable; AAOx3  HEAD: atraumatic and normocephalic  EYES: no pallor, no icterus, PERRLA  ENT: OMM, no pharyngeal erythema, external ears WNL; no nasal discharge; no thrush  NECK: no masses, thyroid normal, trachea midline, no LAD/LN's, supple  CV: RRR with no murmur; normal pulse; normal S1 and S2; no pedal edema  CHEST: Normal respiratory effort; CTAB; normal breath sounds; no wheeze or crackles  ABDOM: nontender and nondistended; soft; normal bowel sounds; no rebound/guarding  MUSC/Skeletal: ROM normal; no crepitus; joints normal; no deformities or arthropathy  EXTREM: no clubbing, cyanosis, inflammation or swelling  SKIN: no rashes, lesions, ulcers, petechiae or subcutaneous nodules  : no barker  NEURO: grossly intact; motor/sensory WNL; AAOx3; no tremors  PSYCH: normal mood, affect and behavior  LYMPH: normal cervical, supraclavicular, axillary and groin LN's            Labs:   1/8/2018  Lab Results   Component Value Date    WBC 4.8 01/08/2018    HGB 13.8 01/08/2018    HCT 41.7 01/08/2018    MCV 79.7 (L) 01/08/2018     01/08/2018     BMP  Lab Results   Component Value Date     01/08/2018    K 4.7 01/08/2018     01/08/2018    CO2 27 01/08/2018    BUN 17 01/08/2018    CREATININE 0.90 01/08/2018    CALCIUM 9.4 01/08/2018    ANIONGAP 10 09/06/2017    ESTGFRAFRICA 109 01/08/2018    EGFRNONAA 94 01/08/2018     Lab Results   Component Value Date    ALT 17 01/08/2018    AST 13 01/08/2018    ALKPHOS 46 01/08/2018    BILITOT  0.4 01/08/2018     Lab Results   Component Value Date    INR 2.1 (H) 01/08/2018    INR 2.3 (H) 11/07/2017    INR 2.1 (H) 10/18/2017       Lab Results   Component Value Date    IRON 88 01/03/2018    TIBC 403 01/03/2018    FERRITIN 26 01/03/2018         Radiology/Diagnostic Studies:    Ct Abdomen Pelvis  Without Contrast    Result Date: 9/7/2017  CT Abdomen and Pelvis without contrast Comparison: 06/09/2017 Technique:  Helically acquired axial images of the abdomen and pelvis were acquired without contrast.   Reformatted coronal and sagittal images provided. FINDINGS: The liver, gallbladder, pancreas, spleen, and adrenal glands are unremarkable. There is no evidence for nephrolithiasis or hydroureteronephrosis.  A Cotter catheter is present within the urinary bladder, which appears thickwalled and with hazy surrounding fat stranding.  No visible thrombus is seen within the bladder.  The prostate gland appears enlarged.  A prominent 9 mm lymph node is present to the right atrium the bladder. A 3 mm nodule is present within the right lower lobe.  Moderate degenerative changes present at L5-S1.    1.  Decompressed ureter bladder with Cotter catheter in place without evidence for intraluminal blood clot.  The bladder wall appears thickened and with hazy stranding fat stranding suggesting cystitis. 2.  3 mm right lower lobe nodule.  In the absence of risk factors, no followup indicated.  Otherwise optional chest CT at 12 months may be performed. Electronically signed by: Liang Ceron MD Date:     09/07/17 Time:    16:19       I have reviewed all available lab results and radiology reports.    Assessment/Plan:   (1) 57 y.o. male with diagnosis of chronic clot disorder with prior bilateral pulmonary emboli  - he has underlying clot disorder with MTHFR-A and MTHFR-C heterozygous condition  - he has been on coumadin long term per direction of Dr Best with cardiology  - INR's have been good     (2) Recurrent hematuria  issues - he was previously hospitalized at [facility] and was previously under the care of Dr Sanchez and is now under the care of Dr Mcconnell with   - recent cyctoscope and TURP with Dr Mcconnell; everything looked good per patient  - he last saw Dr Mcconnell on 10/30/2017  - minor hematuria residual on occasion     (3) Recurrent clot event with DVT in the right popliteal vein  - he has been on coumadin per direction of Dr Best     (4) Pulmonary HTN hx - followed by Dr Robles with Pulmonary     (5) Hx/of gout issues    (6) residual microcytic anemia - hgb wnl and iron panel good right now    PLAN:  1. Check labs every 3 months  2. Check iron panel every 3 months; INR q 4 weeks  3. Continue ICAR-C  4. F/u with PCP,  and Card  5. Will continue coumadin management per patient's request  RTC in 6 months  Fax note to Jayesh Naranjo Hickey, [place]    Discussion:     I have explained all of the above in detail and the patient understands all of the current recommendation(s). I have answered all of their questions to the best of my ability and to their complete satisfaction.   The patient is to continue with the current management plan.            Electronically signed by Dion Garrido MD

## 2018-02-15 ENCOUNTER — TELEPHONE (OUTPATIENT)
Dept: HEMATOLOGY/ONCOLOGY | Facility: CLINIC | Age: 58
End: 2018-02-15

## 2018-02-15 LAB
ALBUMIN SERPL-MCNC: 4.5 G/DL (ref 3.6–5.1)
ALBUMIN/GLOB SERPL: 2 (CALC) (ref 1–2.5)
ALP SERPL-CCNC: 48 U/L (ref 40–115)
ALT SERPL-CCNC: 17 U/L (ref 9–46)
AST SERPL-CCNC: 13 U/L (ref 10–35)
BASOPHILS # BLD AUTO: 20 CELLS/UL (ref 0–200)
BASOPHILS NFR BLD AUTO: 0.4 %
BILIRUB SERPL-MCNC: 0.4 MG/DL (ref 0.2–1.2)
BUN SERPL-MCNC: 16 MG/DL (ref 7–25)
BUN/CREAT SERPL: NORMAL (CALC) (ref 6–22)
CALCIUM SERPL-MCNC: 9.7 MG/DL (ref 8.6–10.3)
CHLORIDE SERPL-SCNC: 105 MMOL/L (ref 98–110)
CO2 SERPL-SCNC: 26 MMOL/L (ref 20–31)
CREAT SERPL-MCNC: 0.8 MG/DL (ref 0.7–1.33)
EOSINOPHIL # BLD AUTO: 172 CELLS/UL (ref 15–500)
EOSINOPHIL NFR BLD AUTO: 3.5 %
ERYTHROCYTE [DISTWIDTH] IN BLOOD BY AUTOMATED COUNT: 16 % (ref 11–15)
FERRITIN SERPL-MCNC: 36 NG/ML (ref 20–380)
GFR SERPL CREATININE-BSD FRML MDRD: 99 ML/MIN/1.73M2
GLOBULIN SER CALC-MCNC: 2.3 G/DL (CALC) (ref 1.9–3.7)
GLUCOSE SERPL-MCNC: 97 MG/DL (ref 65–99)
HCT VFR BLD AUTO: 43.1 % (ref 38.5–50)
HGB BLD-MCNC: 14.2 G/DL (ref 13.2–17.1)
INR PPP: 2.5
IRON SATN MFR SERPL: 18 % (CALC) (ref 15–60)
IRON SERPL-MCNC: 76 MCG/DL (ref 50–180)
LYMPHOCYTES # BLD AUTO: 1651 CELLS/UL (ref 850–3900)
LYMPHOCYTES NFR BLD AUTO: 33.7 %
MCH RBC QN AUTO: 27.7 PG (ref 27–33)
MCHC RBC AUTO-ENTMCNC: 32.9 G/DL (ref 32–36)
MCV RBC AUTO: 84.2 FL (ref 80–100)
MONOCYTES # BLD AUTO: 392 CELLS/UL (ref 200–950)
MONOCYTES NFR BLD AUTO: 8 %
NEUTROPHILS # BLD AUTO: 2666 CELLS/UL (ref 1500–7800)
NEUTROPHILS NFR BLD AUTO: 54.4 %
PLATELET # BLD AUTO: 241 THOUSAND/UL (ref 140–400)
PMV BLD REES-ECKER: 10.4 FL (ref 7.5–12.5)
POTASSIUM SERPL-SCNC: 4.1 MMOL/L (ref 3.5–5.3)
PROT SERPL-MCNC: 6.8 G/DL (ref 6.1–8.1)
PROTHROMBIN TIME: 25.2 SEC (ref 9–11.5)
RBC # BLD AUTO: 5.12 MILLION/UL (ref 4.2–5.8)
SODIUM SERPL-SCNC: 141 MMOL/L (ref 135–146)
TIBC SERPL-MCNC: 420 MCG/DL (CALC) (ref 250–425)
WBC # BLD AUTO: 4.9 THOUSAND/UL (ref 3.8–10.8)

## 2018-02-15 NOTE — TELEPHONE ENCOUNTER
Patient notified INR good at 2.5. He is currently taking Coumadin 6mg daily he verbalized understanding to continue and recheck in 4 weeks.

## 2018-03-09 DIAGNOSIS — D50.0 IRON DEFICIENCY ANEMIA DUE TO CHRONIC BLOOD LOSS: ICD-10-CM

## 2018-03-12 RX ORDER — IRON,CARBONYL/ASCORBIC ACID 100-250 MG
TABLET ORAL
Qty: 30 TABLET | Refills: 0 | Status: SHIPPED | OUTPATIENT
Start: 2018-03-12 | End: 2018-04-16 | Stop reason: SDUPTHER

## 2018-03-21 DIAGNOSIS — E56.9 VITAMIN DEFICIENCY: ICD-10-CM

## 2018-03-21 DIAGNOSIS — R79.83 HOMOCYSTEINEMIA: ICD-10-CM

## 2018-03-22 RX ORDER — FOLIC ACID-PYRIDOXINE-CYANOCOBALAMIN TAB 2.5-25-2 MG 2.5-25-2 MG
TAB ORAL
Qty: 30 TABLET | Refills: 0 | Status: SHIPPED | OUTPATIENT
Start: 2018-03-22 | End: 2018-04-25 | Stop reason: SDUPTHER

## 2018-03-29 DIAGNOSIS — D68.9 CLOTTING DISORDER: Primary | ICD-10-CM

## 2018-03-29 RX ORDER — WARFARIN 4 MG/1
TABLET ORAL
Qty: 60 TABLET | Refills: 0 | Status: SHIPPED | OUTPATIENT
Start: 2018-03-29 | End: 2018-05-09 | Stop reason: SDUPTHER

## 2018-04-16 DIAGNOSIS — D50.0 IRON DEFICIENCY ANEMIA DUE TO CHRONIC BLOOD LOSS: ICD-10-CM

## 2018-04-17 RX ORDER — IRON,CARBONYL/ASCORBIC ACID 100-250 MG
TABLET ORAL
Qty: 30 TABLET | Refills: 0 | Status: SHIPPED | OUTPATIENT
Start: 2018-04-17 | End: 2018-05-23 | Stop reason: SDUPTHER

## 2018-04-25 DIAGNOSIS — R79.83 HOMOCYSTEINEMIA: ICD-10-CM

## 2018-04-25 DIAGNOSIS — E56.9 VITAMIN DEFICIENCY: ICD-10-CM

## 2018-04-25 RX ORDER — FOLIC ACID-PYRIDOXINE-CYANOCOBALAMIN TAB 2.5-25-2 MG 2.5-25-2 MG
TAB ORAL
Qty: 30 TABLET | Refills: 0 | Status: ON HOLD | OUTPATIENT
Start: 2018-04-25 | End: 2019-08-27

## 2018-04-29 NOTE — PROGRESS NOTES
[facility] Urology Progress Note    Mo Escobar is a 58 y.o. male who presents for follow up of BPH/gross hematuria     He has a hx BPH s/p TURP in 2014 but had persistent gross hematuria intermittently ever since, which worsened earlier this year as he is chronically anticoagulated for clotting disorder.   He had admission for GH with clot retention mid 2017 and PSA which had been historically elevated for his age.    Bridged with lovenox for cystoscopic evaluation of his GH and prostate biopsy on 6/13/17. 92g prostate by ultrasound with cystoscopic evidence of outlet obstruction with significant regrowth of lateral lobes and very large significant abnormal hypervascular edematous median lobe with calcifications. CT urogram negative except for prostatomegaly. Biopsy pathology negative for malignancy, and only demonstrated BPH.      On 6/22/17 he had combination TURP/TULVP of his large erythematous obstructing prostate. Resected portion demonstrated pathologic BPH (20g).  He did pass a fill-and-pull voiding trial on 6/27/17 and resumed chronic anticoagulation with Coumadin.  He returned on 7/31/17 voiding well with a good and full stream, which he didn't even have after his first TURP, and was no longer having to force his urine out.   A few weeks after barker removed had a scab came off and GH for almost a week, with clots, which cleared ultimately. He did have some initial urgency postop which had nearly resolved by that point, nocturia decd to 1x, PVR 5cc.     On 9/7/17 after use of riding mower and a ride in a flat bottom boat had recurrent GH with clot retention. After manual irrigation, cleared and CT pelvis after showed no residual hyperdense material or clot in bladder, and urine remained clear for 24 hours so barker removed on 9/8/17 after passing fill and pull voiding trial.  I last saw him 10/30/17 and he had been urinating well without any issues or interim GH. Had DCed proscar 2 weeks prior.  No urinary  hesitancy, intermittency. Notes good flow/stream and feels like he empties his bladder.  First time in years he hasnt had voiding/prostate issues and has been free of hematuria. PVR 31cc  He did have recurrence of GH one month later that he called in about but it cleared with hydration.    He returns today noting:  Still occasionally seeing blood in urine  Not as often as he used to  Thinks it happens every couple of months - may go a few months with only clear urine, then have a week or two with blood in urine and may urinate some clots.  About 1 week ago had a week of heavy blood in urine with clots. Was helping friend build a deck around swimming pool.  Most times is random without activity. Other than last week has been sporadic, nothing major, occ small clots  Urination itself is great. Good flow and stream.  Every once in a while feels like he wont empty and returns to complete voiding.  AUA SS: 5/1 (2: freq, intermittency; 1 weak stream)  PVR 62cc  Udip clear, 1.020, ph 5.0, 100 prot, trc blood  Coumadin levels monitored by Dr Garrido  Has been a little over 1.5 months since INR checked bc his lab draw place shut down - gets busy and forgets  No dysuria        ROS: A comprehensive 10 system review was performed and is negative except as noted above in HPI    PHYSICAL EXAM:    Vitals:    04/30/18 0847   BP: (!) 149/78   Pulse: (!) 54   Resp: 18     Body mass index is 38.87 kg/m². Weight: 130 kg (286 lb 9.6 oz) Height: 6' (182.9 cm)       General: Alert, cooperative, no distress, appears stated age   Head: Normocephalic, without obvious abnormality, atraumatic   Eyes: PERRL, conjunctiva/corneas clear   Lungs: Respirations unlabored   Heart: Warm and well perfused   Abdomen: soft NT ND  Extremities: Extremities normal, atraumatic, no cyanosis or edema   Skin: Skin color, texture, turgor normal, no rashes or lesions   Psych: Appropriate   Neurologic: Non-focal       Recent Results (from the past 336 hour(s))    POCT URINE DIPSTICK WITHOUT MICROSCOPE    Collection Time: 04/30/18  8:59 AM   Result Value Ref Range    Color, UA clear     Spec Grav UA 1.020     pH, UA 5.0     WBC, UA neg     Nitrite, UA neg     Protein 100     Glucose, UA neg     Ketones, UA neg     Urobilinogen, UA neg     Bilirubin neg     Blood, UA trace    POCT Bladder Scan    Collection Time: 04/30/18  8:59 AM   Result Value Ref Range    POC Residual Urine Volume 62 0 - 100 mL       ASSESSMENT   1. Benign prostatic hyperplasia without lower urinary tract symptoms  POCT URINE DIPSTICK WITHOUT MICROSCOPE    POCT Bladder Scan   2. Prostate cancer screening  PSA, Screening       Plan    Overall doing very well status post TURP with good voiding habits and significantly less gross hematuria than he had before given his coagulopathy and chronic anticoagulation.  He has been deficient and monitoring his Coumadin levels and I wonder if his recent episode of heavy bleeding is due to being supratherapeutic.  Perhaps the small occasions when he does see blood in his urine his Coumadin levels are getting too high.  Discussed the importance of continued monitoring, and we'll notify Dr. Garrido.    After last visit since he had been doing well, with clear urine, finasteride discontinued.  Had been using it for prostatic source bleeding.  As he does have intermittent gross hematuria, at this time to avoid any further issues from gross hematuria and to help minimize this, will restart finasteride 5 mg daily.  RTC 6 months at which time he'll be due for a PSA.  I did advise remaining on the finasteride for this entire period to the PSA could be interpreted in the context of his finasteride use.  Continue follow-up as scheduled with Dr. Garrido for anticoagulation

## 2018-04-30 ENCOUNTER — OFFICE VISIT (OUTPATIENT)
Dept: UROLOGY | Facility: CLINIC | Age: 58
End: 2018-04-30
Payer: MEDICARE

## 2018-04-30 VITALS
SYSTOLIC BLOOD PRESSURE: 149 MMHG | HEIGHT: 72 IN | DIASTOLIC BLOOD PRESSURE: 78 MMHG | BODY MASS INDEX: 38.82 KG/M2 | HEART RATE: 54 BPM | WEIGHT: 286.63 LBS | RESPIRATION RATE: 18 BRPM

## 2018-04-30 DIAGNOSIS — N40.0 BENIGN PROSTATIC HYPERPLASIA WITHOUT LOWER URINARY TRACT SYMPTOMS: Primary | ICD-10-CM

## 2018-04-30 DIAGNOSIS — Z12.5 PROSTATE CANCER SCREENING: ICD-10-CM

## 2018-04-30 LAB
BILIRUB SERPL-MCNC: ABNORMAL MG/DL
BLOOD URINE, POC: ABNORMAL
COLOR, POC UA: CLEAR
GLUCOSE UR QL STRIP: ABNORMAL
KETONES UR QL STRIP: ABNORMAL
LEUKOCYTE ESTERASE URINE, POC: ABNORMAL
NITRITE, POC UA: ABNORMAL
PH, POC UA: 5
POC RESIDUAL URINE VOLUME: 62 ML (ref 0–100)
PROTEIN, POC: 100
SPECIFIC GRAVITY, POC UA: 1.02
UROBILINOGEN, POC UA: ABNORMAL

## 2018-04-30 PROCEDURE — 99999 PR PBB SHADOW E&M-EST. PATIENT-LVL III: CPT | Mod: PBBFAC,,, | Performed by: UROLOGY

## 2018-04-30 PROCEDURE — 99213 OFFICE O/P EST LOW 20 MIN: CPT | Mod: S$PBB,,, | Performed by: UROLOGY

## 2018-04-30 PROCEDURE — 81002 URINALYSIS NONAUTO W/O SCOPE: CPT | Mod: PBBFAC,PN | Performed by: UROLOGY

## 2018-04-30 PROCEDURE — 51798 US URINE CAPACITY MEASURE: CPT | Mod: PBBFAC,PN | Performed by: UROLOGY

## 2018-04-30 PROCEDURE — 99213 OFFICE O/P EST LOW 20 MIN: CPT | Mod: PBBFAC,PN,25 | Performed by: UROLOGY

## 2018-04-30 RX ORDER — FINASTERIDE 5 MG/1
5 TABLET, FILM COATED ORAL DAILY
Qty: 30 TABLET | Refills: 11 | Status: SHIPPED | OUTPATIENT
Start: 2018-04-30 | End: 2019-05-06 | Stop reason: ALTCHOICE

## 2018-05-09 ENCOUNTER — TELEPHONE (OUTPATIENT)
Dept: HEMATOLOGY/ONCOLOGY | Facility: CLINIC | Age: 58
End: 2018-05-09

## 2018-05-09 DIAGNOSIS — Z15.89 HETEROZYGOUS MTHFR MUTATION C677T: ICD-10-CM

## 2018-05-09 DIAGNOSIS — D68.9 CLOTTING DISORDER: ICD-10-CM

## 2018-05-09 DIAGNOSIS — Z15.89 HETEROZYGOUS MTHFR MUTATION A1298C: ICD-10-CM

## 2018-05-09 DIAGNOSIS — I82.431 ACUTE DEEP VEIN THROMBOSIS (DVT) OF POPLITEAL VEIN OF RIGHT LOWER EXTREMITY: ICD-10-CM

## 2018-05-09 DIAGNOSIS — I27.82 CHRONIC SADDLE PULMONARY EMBOLISM WITHOUT ACUTE COR PULMONALE: ICD-10-CM

## 2018-05-09 DIAGNOSIS — D50.0 ANEMIA DUE TO CHRONIC BLOOD LOSS: ICD-10-CM

## 2018-05-09 DIAGNOSIS — D50.9 MICROCYTIC ANEMIA: Primary | ICD-10-CM

## 2018-05-09 DIAGNOSIS — R31.0 GROSS HEMATURIA: ICD-10-CM

## 2018-05-09 DIAGNOSIS — I26.92 CHRONIC SADDLE PULMONARY EMBOLISM WITHOUT ACUTE COR PULMONALE: ICD-10-CM

## 2018-05-09 NOTE — TELEPHONE ENCOUNTER
Spoke to patient today. Sent orders for monthly PT/INR and q 3month labs to patient's PCP to be drawn

## 2018-05-10 RX ORDER — WARFARIN 4 MG/1
TABLET ORAL
Qty: 60 TABLET | Refills: 0 | Status: SHIPPED | OUTPATIENT
Start: 2018-05-10 | End: 2019-05-06 | Stop reason: DRUGHIGH

## 2018-05-23 DIAGNOSIS — D50.0 IRON DEFICIENCY ANEMIA DUE TO CHRONIC BLOOD LOSS: ICD-10-CM

## 2018-05-23 RX ORDER — IRON,CARBONYL/ASCORBIC ACID 100-250 MG
TABLET ORAL
Qty: 30 TABLET | Refills: 0 | Status: SHIPPED | OUTPATIENT
Start: 2018-05-23 | End: 2018-06-26 | Stop reason: SDUPTHER

## 2018-06-19 ENCOUNTER — TELEPHONE (OUTPATIENT)
Dept: HEMATOLOGY/ONCOLOGY | Facility: CLINIC | Age: 58
End: 2018-06-19

## 2018-06-19 DIAGNOSIS — I82.431 ACUTE DEEP VEIN THROMBOSIS (DVT) OF POPLITEAL VEIN OF RIGHT LOWER EXTREMITY: Primary | ICD-10-CM

## 2018-06-19 RX ORDER — WARFARIN SODIUM 5 MG/1
5 TABLET ORAL DAILY
COMMUNITY
End: 2018-06-19 | Stop reason: SDUPTHER

## 2018-06-19 NOTE — TELEPHONE ENCOUNTER
Spoke to patient today. His INR is 3.9. He already took dose today so he will hold Wed. and Thurs. Doses. He will resume dose at 5mg daily on Friday and then will recheck his INR next Wed.

## 2018-06-20 RX ORDER — WARFARIN SODIUM 5 MG/1
5 TABLET ORAL DAILY
Qty: 30 TABLET | Refills: 1 | Status: SHIPPED | OUTPATIENT
Start: 2018-06-20 | End: 2018-08-27 | Stop reason: SDUPTHER

## 2018-06-26 DIAGNOSIS — D50.0 IRON DEFICIENCY ANEMIA DUE TO CHRONIC BLOOD LOSS: ICD-10-CM

## 2018-06-26 RX ORDER — IRON,CARBONYL/ASCORBIC ACID 100-250 MG
TABLET ORAL
Qty: 30 TABLET | Refills: 0 | Status: SHIPPED | OUTPATIENT
Start: 2018-06-26 | End: 2018-07-29 | Stop reason: SDUPTHER

## 2018-07-25 ENCOUNTER — TELEPHONE (OUTPATIENT)
Dept: HEMATOLOGY/ONCOLOGY | Facility: CLINIC | Age: 58
End: 2018-07-25

## 2018-07-25 ENCOUNTER — OFFICE VISIT (OUTPATIENT)
Dept: HEMATOLOGY/ONCOLOGY | Facility: CLINIC | Age: 58
End: 2018-07-25
Payer: MEDICARE

## 2018-07-25 VITALS
WEIGHT: 302 LBS | HEART RATE: 71 BPM | SYSTOLIC BLOOD PRESSURE: 125 MMHG | TEMPERATURE: 98 F | HEIGHT: 72 IN | BODY MASS INDEX: 40.9 KG/M2 | DIASTOLIC BLOOD PRESSURE: 79 MMHG

## 2018-07-25 DIAGNOSIS — I26.92 CHRONIC SADDLE PULMONARY EMBOLISM WITHOUT ACUTE COR PULMONALE: Primary | ICD-10-CM

## 2018-07-25 DIAGNOSIS — D50.0 ANEMIA DUE TO CHRONIC BLOOD LOSS: ICD-10-CM

## 2018-07-25 DIAGNOSIS — I82.431 ACUTE DEEP VEIN THROMBOSIS (DVT) OF POPLITEAL VEIN OF RIGHT LOWER EXTREMITY: ICD-10-CM

## 2018-07-25 DIAGNOSIS — D50.9 MICROCYTIC ANEMIA: ICD-10-CM

## 2018-07-25 DIAGNOSIS — I27.82 CHRONIC SADDLE PULMONARY EMBOLISM WITHOUT ACUTE COR PULMONALE: Primary | ICD-10-CM

## 2018-07-25 PROCEDURE — 99213 OFFICE O/P EST LOW 20 MIN: CPT | Mod: ,,, | Performed by: INTERNAL MEDICINE

## 2018-07-25 RX ORDER — TESTOSTERONE ENANTHATE 200 MG/ML
200 VIAL (ML) INTRAMUSCULAR
COMMUNITY
End: 2019-05-06

## 2018-07-25 NOTE — PROGRESS NOTES
[facility] Hematology/Oncology  PROGRESS NOTE      Subjective:       Patient ID:   NAME: Mo Escobar : 1960     58 y.o. male    Referring Doc:  Jose A/Eleonora Renteria  Other Physicians: Travis Best Pinsky    Chief Complaint:  Clot disorder    History of Present Illness:     Patient returns today for a regularly scheduled follow-up visit.  The patient is doing ok with no new issues. He has been under the care of Dr Mcconnell with  and has been doing good with the bladder issues. No excessive bleeding or bruising. He is still on coumadin per our direction but he has been getting the INR done with his daughter's office and we last had a result at end of . He has been getting labs done at his daughter's place of employment but we havent been getting the labs results regularly.  No CP,SOB, HA's or N/V. He is here with his young grandson. He missed taking the coumadin for about 3-4 days by accident.           ROS:   GEN: normal without any fever, night sweats or weight loss  HEENT: normal with no HA's, sore throat, stiff neck, changes in vision  CV: normal with no CP, SOB, PND, HERRERA or orthopnea  PULM: normal with no SOB, cough, hemoptysis, sputum or pleuritic pain  GI: normal with no abdominal pain, nausea, vomiting, constipation, diarrhea, melanotic stools, BRBPR, or hematemesis  : normal with no hematuria, dysuria  BREAST: normal with no mass, discharge, pain  SKIN: normal with no rash, erythema, bruising, or swelling    Allergies:  Review of patient's allergies indicates:  No Known Allergies    Medications:    Current Outpatient Prescriptions:     cholecalciferol, vitamin D3, (VITAMIN D3) 5,000 unit Tab, Take 5,000 Units by mouth once daily., Disp: , Rfl:     finasteride (PROSCAR) 5 mg tablet, Take 1 tablet (5 mg total) by mouth once daily., Disp: 30 tablet, Rfl: 11    FOLBIC 2.5-25-2 mg Tab, TAKE ONE TABLET BY MOUTH ONCE DAILY, Disp: 30 tablet, Rfl: 0    iron-vitamin C 100-250 mg, ICAR-C, 100-250 mg  Tab, TAKE ONE TABLET BY MOUTH ONCE DAILY, Disp: 30 tablet, Rfl: 0    levothyroxine (SYNTHROID) 112 MCG tablet, Take 112 mcg by mouth once daily., Disp: , Rfl:     testosterone enanthate (DELATESTRYL) 200 mg/mL injection, Inject 200 mg into the muscle every 7 days., Disp: , Rfl:     warfarin (COUMADIN) 4 MG tablet, TAKE ONE AND ONE HALF TABLET BY MOUTH DAILY., Disp: 60 tablet, Rfl: 0    warfarin (COUMADIN) 5 MG tablet, Take 1 tablet (5 mg total) by mouth Daily., Disp: 30 tablet, Rfl: 1    warfarin (COUMADIN) 6 MG tablet, Take 6 mg by mouth once daily., Disp: , Rfl:     meloxicam (MOBIC) 15 MG tablet, Take 15 mg by mouth once daily., Disp: , Rfl:     PMHx/PSHx Updates:  See patient's last visit with me on  1/24/2018.  See H&P on 8/21/2014        Pathology:  Cancer Staging  No matching staging information was found for the patient.          Objective:     Vitals:  Blood pressure 125/79, pulse 71, temperature 98 °F (36.7 °C), height 6' (1.829 m), weight (!) 137 kg (302 lb).    Physical Examination:   GEN: no apparent distress, comfortable; AAOx3  HEAD: atraumatic and normocephalic  EYES: no pallor, no icterus, PERRLA  ENT: OMM, no pharyngeal erythema, external ears WNL; no nasal discharge; no thrush  NECK: no masses, thyroid normal, trachea midline, no LAD/LN's, supple  CV: RRR with no murmur; normal pulse; normal S1 and S2; no pedal edema  CHEST: Normal respiratory effort; CTAB; normal breath sounds; no wheeze or crackles  ABDOM: nontender and nondistended; soft; normal bowel sounds; no rebound/guarding  MUSC/Skeletal: ROM normal; no crepitus; joints normal; no deformities or arthropathy  EXTREM: no clubbing, cyanosis, inflammation or swelling  SKIN: no rashes, lesions, ulcers, petechiae or subcutaneous nodules  : no barker  NEURO: grossly intact; motor/sensory WNL; AAOx3; no tremors  PSYCH: normal mood, affect and behavior  LYMPH: normal cervical, supraclavicular, axillary and groin LN's            Labs:      7/24/2018 INR pending      2/14/2018  Lab Results   Component Value Date    WBC 4.9 02/14/2018    HGB 14.2 02/14/2018    HCT 43.1 02/14/2018    MCV 84.2 02/14/2018     02/14/2018     BMP  Lab Results   Component Value Date     02/14/2018    K 4.1 02/14/2018     02/14/2018    CO2 26 02/14/2018    BUN 16 02/14/2018    CREATININE 0.80 02/14/2018    CALCIUM 9.7 02/14/2018    ANIONGAP 10 09/06/2017    ESTGFRAFRICA 115 02/14/2018    EGFRNONAA 99 02/14/2018     Lab Results   Component Value Date    ALT 17 02/14/2018    AST 13 02/14/2018    ALKPHOS 48 02/14/2018    BILITOT 0.4 02/14/2018     Lab Results   Component Value Date    INR 2.5 (H) 02/14/2018    INR 2.1 (H) 01/08/2018    INR 2.3 (H) 11/07/2017       Lab Results   Component Value Date    IRON 76 02/14/2018    TIBC 420 02/14/2018    FERRITIN 36 02/14/2018         Radiology/Diagnostic Studies:    Ct Abdomen Pelvis  Without Contrast    Result Date: 9/7/2017  CT Abdomen and Pelvis without contrast Comparison: 06/09/2017 Technique:  Helically acquired axial images of the abdomen and pelvis were acquired without contrast.   Reformatted coronal and sagittal images provided. FINDINGS: The liver, gallbladder, pancreas, spleen, and adrenal glands are unremarkable. There is no evidence for nephrolithiasis or hydroureteronephrosis.  A Cotter catheter is present within the urinary bladder, which appears thickwalled and with hazy surrounding fat stranding.  No visible thrombus is seen within the bladder.  The prostate gland appears enlarged.  A prominent 9 mm lymph node is present to the right atrium the bladder. A 3 mm nodule is present within the right lower lobe.  Moderate degenerative changes present at L5-S1.    1.  Decompressed ureter bladder with Cotter catheter in place without evidence for intraluminal blood clot.  The bladder wall appears thickened and with hazy stranding fat stranding suggesting cystitis. 2.  3 mm right lower lobe nodule.  In the  absence of risk factors, no followup indicated.  Otherwise optional chest CT at 12 months may be performed. Electronically signed by: Liang Ceron MD Date:     09/07/17 Time:    16:19       I have reviewed all available lab results and radiology reports.    Assessment/Plan:   (1) 57 y.o. male with diagnosis of chronic clot disorder with prior bilateral pulmonary emboli  - he has underlying clot disorder with MTHFR-A and MTHFR-C heterozygous condition  - he has been on coumadin long term per direction of Dr Best with cardiology  - INR's on 6/27/2018 was 1.4     (2) Recurrent hematuria issues - he was previously hospitalized at [facility] and was previously under the care of Dr Sanchez and is now under the care of Dr Mcconnell with   - recent cyctoscope and TURP with Dr Mcconnell; everything looked good per patient  - he last saw Dr Mcconnell on 10/30/2017  - minor hematuria residual on occasion     (3) Recurrent clot event with DVT in the right popliteal vein  - he has been on coumadin per direction of Dr Best     (4) Pulmonary HTN hx - followed by Dr Robles with Pulmonary     (5) Hx/of gout issues    (6) residual microcytic anemia - hgb wnl and iron panel good right now    (7) he has resumed testosterone      1. Chronic saddle pulmonary embolism without acute cor pulmonale     2. Acute deep vein thrombosis (DVT) of popliteal vein of right lower extremity     3. Microcytic anemia     4. Anemia due to chronic blood loss         PLAN:  1. Check labs every 3 months  2. Check iron panel every 3 months; INR q 4 weeks - encouraged compliance and if he continues to have it done via his daughter's place of employement he needs to make sure we get the results  3. Continue ICAR-C  4. F/u with PCP,  and Card  5. Will continue coumadin management per patient's request    RTC in 6 months  Fax note to Jayesh Naranjo Hickey, [place]    Discussion:     I have explained all of the above in detail and the patient understands all of the  current recommendation(s). I have answered all of their questions to the best of my ability and to their complete satisfaction.   The patient is to continue with the current management plan.            Electronically signed by Dion Garrido MD

## 2018-07-25 NOTE — TELEPHONE ENCOUNTER
Called patient to let him know his INR was 1.4 and yudelka wants him to to increase coumadin to 5.5mg daily and get rechecked in 2 days

## 2018-07-25 NOTE — LETTER
July 25, 2018      Alphonso Naranjo MD  [address]  [address]  [place] [address]           [facility] - Hematology Oncology  [address]  [address]  [facility] [place] [address]  Phone: [phone]  Fax: [phone]          Patient: Mo Escobar   MR Number: [id number]   YOB: 1960   Date of Visit: 7/25/2018       Dear Dr. Alphonso Naranjo:    Thank you for referring Mo Escobar to me for evaluation. Attached you will find relevant portions of my assessment and plan of care.    If you have questions, please do not hesitate to call me. I look forward to following Mo Escobar along with you.    Sincerely,    Dion Garrido MD    Enclosure  CC:  No Recipients    If you would like to receive this communication electronically, please contact [email or web address] or [phone] to request more information on [facility] access.    For providers and/or their staff who would like to refer a patient to [facility], please contact us through our one-stop-shop provider referral line, [facility], at [phone].    If you feel you have received this communication in error or would no longer like to receive these types of communications, please e-mail [email or web address]

## 2018-07-29 DIAGNOSIS — D50.0 IRON DEFICIENCY ANEMIA DUE TO CHRONIC BLOOD LOSS: ICD-10-CM

## 2018-07-30 RX ORDER — IRON,CARBONYL/ASCORBIC ACID 100-250 MG
TABLET ORAL
Qty: 30 TABLET | Refills: 0 | Status: SHIPPED | OUTPATIENT
Start: 2018-07-30 | End: 2018-08-30 | Stop reason: SDUPTHER

## 2018-08-10 ENCOUNTER — TELEPHONE (OUTPATIENT)
Dept: HEMATOLOGY/ONCOLOGY | Facility: CLINIC | Age: 58
End: 2018-08-10

## 2018-08-10 NOTE — TELEPHONE ENCOUNTER
Spoke to patient. He is on 5.5mg. INR is 2.8 and he is to continue same dose and reschedule in 2 weeks.

## 2018-08-27 DIAGNOSIS — I82.431 ACUTE DEEP VEIN THROMBOSIS (DVT) OF POPLITEAL VEIN OF RIGHT LOWER EXTREMITY: ICD-10-CM

## 2018-08-27 RX ORDER — WARFARIN SODIUM 5 MG/1
5 TABLET ORAL DAILY
Qty: 30 TABLET | Refills: 0 | Status: SHIPPED | OUTPATIENT
Start: 2018-08-27 | End: 2018-09-26 | Stop reason: SDUPTHER

## 2018-08-30 DIAGNOSIS — D50.0 IRON DEFICIENCY ANEMIA DUE TO CHRONIC BLOOD LOSS: ICD-10-CM

## 2018-08-30 RX ORDER — IRON,CARBONYL/ASCORBIC ACID 100-250 MG
TABLET ORAL
Qty: 30 TABLET | Refills: 0 | Status: SHIPPED | OUTPATIENT
Start: 2018-08-30 | End: 2018-12-12 | Stop reason: SDUPTHER

## 2018-09-26 DIAGNOSIS — I82.431 ACUTE DEEP VEIN THROMBOSIS (DVT) OF POPLITEAL VEIN OF RIGHT LOWER EXTREMITY: ICD-10-CM

## 2018-09-26 RX ORDER — WARFARIN SODIUM 5 MG/1
5 TABLET ORAL DAILY
Qty: 30 TABLET | Refills: 0 | Status: SHIPPED | OUTPATIENT
Start: 2018-09-26 | End: 2018-11-03 | Stop reason: SDUPTHER

## 2018-10-12 ENCOUNTER — TELEPHONE (OUTPATIENT)
Dept: UROLOGY | Facility: CLINIC | Age: 58
End: 2018-10-12

## 2018-10-12 NOTE — TELEPHONE ENCOUNTER
Call placed to inquire can we reschedule his follow up appt from 10/30 to 11/5, no answer, message left with call back number.

## 2018-10-15 ENCOUNTER — TELEPHONE (OUTPATIENT)
Dept: UROLOGY | Facility: CLINIC | Age: 58
End: 2018-10-15

## 2018-10-15 NOTE — TELEPHONE ENCOUNTER
Patient returned call, informed we needed to reschedule his appt to 11/5/18. Patient states he can but he needs it to be early in the am, that's why he made it so far in advance. appt made, patient verbally understood.

## 2018-10-19 ENCOUNTER — TELEPHONE (OUTPATIENT)
Dept: UROLOGY | Facility: CLINIC | Age: 58
End: 2018-10-19

## 2018-10-19 NOTE — TELEPHONE ENCOUNTER
Patient has been rescheduled twice.  Late morning appointments do not work for him.   First available appt before 11am is 12/28.  Please check the schedule and offer sooner appt.

## 2018-10-19 NOTE — TELEPHONE ENCOUNTER
----- Message from Chelseystephany Navarrofarheen sent at 10/19/2018 10:51 AM CDT -----  Type:  Patient Returning Call    Who Called:  patient  Who Left Message for Patient:  Not sure  Does the patient know what this is regarding?:  Time of his appt  Best Call Back Number:  [phone] (home)   Additional Information:  States that if he doesn't have his earlier appt he needs to be rescheduled.  If he can come in at 9:15/9:30 he will be there otherwise he needs another early morning appt.   Thanks

## 2018-10-19 NOTE — TELEPHONE ENCOUNTER
Keep same day for him, earlier is ok. If 0815 acceotable book there, otherwise Ok per me to DB to 845 that day

## 2018-10-26 ENCOUNTER — TELEPHONE (OUTPATIENT)
Dept: HEMATOLOGY/ONCOLOGY | Facility: CLINIC | Age: 58
End: 2018-10-26

## 2018-10-26 DIAGNOSIS — Z79.01 LONG TERM (CURRENT) USE OF ANTICOAGULANTS: Primary | ICD-10-CM

## 2018-10-26 NOTE — TELEPHONE ENCOUNTER
Spoke to anibal. His INR is elevated at 4.5. No new medications voiced. He has already taken the coumadin today but will hold Sat, Sun and Mon. But will recheck his INR on Monday. I explained that since he goes to [facility] we won't get the results until Tuesday morning. I asked him to call here Tuesday afternoon to speak to Edwin about what to do with his coumadin but he should not resume it until he talks to Edwin. He voiced understanding

## 2018-10-31 ENCOUNTER — TELEPHONE (OUTPATIENT)
Dept: HEMATOLOGY/ONCOLOGY | Facility: CLINIC | Age: 58
End: 2018-10-31

## 2018-10-31 NOTE — TELEPHONE ENCOUNTER
Spoke to patient. He is to resume his coumadin at 5mg po daily and recheck his INR in 2 weeks. He voiced understanding. INR was 1.3 he had been on hold since sat. For elevated INR of 4.5  He had been on 5.5mg coumadin

## 2018-11-03 DIAGNOSIS — I82.431 ACUTE DEEP VEIN THROMBOSIS (DVT) OF POPLITEAL VEIN OF RIGHT LOWER EXTREMITY: ICD-10-CM

## 2018-11-03 NOTE — PROGRESS NOTES
[facility] Urology Progress Note    Mo Escobar is a 58 y.o. male who presents for follow up of BPH/gross hematuria     Hx of BPH s/p TURP in 2014 but had persistent gross hematuria intermittently ever since, which worsened earlier this year as he is chronically anticoagulated for clotting disorder.   He had admission for GH with clot retention mid 2017 and PSA which had been historically elevated for his age.    Bridged with lovenox for cystoscopic evaluation of his GH and prostate biopsy on 6/13/17. Psa 9.6 (34% free) 6/2017.  92g prostate by ultrasound with cystoscopic evidence of outlet obstruction with significant regrowth of lateral lobes and very large significant abnormal hypervascular edematous median lobe with calcifications. CT urogram negative except for prostatomegaly. Biopsy pathology negative for malignancy, and only demonstrated BPH.      6/22/17 - TURP/TULVP of his large erythematous obstructing prostate. Resected portion demonstrated pathologic BPH (20g).  He did pass a fill-and-pull voiding trial on 6/27/17 and resumed chronic anticoagulation with Coumadin.    7/31/17 - voiding well w/ good full stream, which he didn't even have after his first TURP. Initial urgency postop had nearly resolved, NTF down to 1x, PVR 5cc.  9/7/17 - recurrent GH with clot retention after riding mower and a ride in a flat bottom boat - manually irrigated and CT negative after w/ no residual clot, barker removed 24h later  10/30/17 - no urinary complaints, DCed proscar 2 weeks prior. Denied all LUTS emptied well, PVR 31cc, one interim epidosde GH cleared with hydration    I last saw him 4/30/18 noting intermittent GH every few mos especially if active, o/w urinating great with good flow and stream. AUA SS: 5/1 (2: freq, intermittency; 1 weak stream). PVR 62cc  Advised compliance checking coumadin levels, restarted finasteride 2/2 interim sporadic GH    He returns today noting:  May have only done one fill of his  finasteride   Still urinating plenty of blood at times, sometimes bad with clots.  Bad every couple of months, but every few weeks sees bright red urine.  About 1 month ago severe with large clots and was able to pass everything.  Urine looked clear today.  Very seldom burning with urination  When he has significant hematuria/clots just overall doesn't feel well  When no blood, good flow, good stream  AUA SS: 9/3 (3 intermittency, 2 straining, 1: emptying, freq, urgency, weak stream)    Hasnt been taken testosterone in the last few weeks since seeing psa results.  Has been on 200mg weekly IM TRT  Has not seen any blood in his urine since discontinuing his TRT  Testosterone has been managed by Michaela Renteria NP       PSA 5/16/18 at [facility] labs of 4.75 (on finasteride)  PSA 10/24/18 at [facility] of 8.7    Udip 30 prot, trc blood  PVR 60cc      ROS: A comprehensive 10 system review was performed and is negative except as noted above in HPI    PHYSICAL EXAM:    Vitals:    11/05/18 0845   BP: 138/69   Pulse: (!) 58   Resp: 18   Temp: 98.1 °F (36.7 °C)     Body mass index is 41.86 kg/m². Weight: (!) 140 kg (308 lb 10.3 oz) Height: 6' (182.9 cm)       General: Alert, cooperative, no distress, appears stated age   Head: Normocephalic, without obvious abnormality, atraumatic   Eyes: PERRL, conjunctiva/corneas clear   Lungs: Respirations unlabored   Heart: Warm and well perfused   Abdomen: soft NT ND  : normal phallus, bilat desc test, LEORA unable to palpate prostate 2/2 body habitus  Extremities: Extremities normal, atraumatic, no cyanosis or edema   Skin: Skin color, texture, turgor normal, no rashes or lesions   Psych: Appropriate   Neurologic: Non-focal       Recent Results (from the past 336 hour(s))   POCT URINE DIPSTICK WITHOUT MICROSCOPE    Collection Time: 11/05/18  8:47 AM   Result Value Ref Range    Color, UA yellow     Spec Grav UA 1.030     pH, UA 5     WBC, UA neg     Nitrite, UA neg     Protein 30      Glucose, UA neg     Ketones, UA neg     Urobilinogen, UA neg     Bilirubin neg     Blood, UA trace        ASSESSMENT   1. Gross hematuria  POCT URINE DIPSTICK WITHOUT MICROSCOPE    Urine culture    Cytology, urine   2. BPH without obstruction/lower urinary tract symptoms     3. Elevated PSA  PSA, total and free    Miscellaneous Sendout Test Other (Specify) (prostate)   4. Hypogonadism in male  Testosterone Panel    CBC auto differential       Plan    Given intermittent GH will recheck Ucx and Ucytology  Is on coumadin  GH has previously been prostatic source  Had sig prostate vol >90g with obstruction  Has since resumed TRT via PCP and is on high dose 200mg weekly  Has held it for a few weeks and no GH  Discussed contribution of trt to prostate growth (and thus prostate vascularity) and advised continuing to hold  As is off TRT and finasteride at this time, will recheck labs 3 mos off all (in 2 mos)  PSA also remains elevated, essentially stable but now higher than expected for residual prostate volume, and has been on TRT.  Will reassess with labs with free and total.  Will send ConfirmMdx genetic analysis of past biopsy tissue to determine future risk of finding CaP    Plan to RTC 6 mos tenatively  Will chart check labs and confrim mdx in 2 mos and give further recs and update plan as needed

## 2018-11-05 ENCOUNTER — APPOINTMENT (OUTPATIENT)
Dept: LAB | Facility: HOSPITAL | Age: 58
End: 2018-11-05
Attending: UROLOGY
Payer: MEDICARE

## 2018-11-05 ENCOUNTER — OFFICE VISIT (OUTPATIENT)
Dept: UROLOGY | Facility: CLINIC | Age: 58
End: 2018-11-05
Payer: MEDICARE

## 2018-11-05 VITALS
WEIGHT: 308.63 LBS | BODY MASS INDEX: 41.8 KG/M2 | HEART RATE: 58 BPM | TEMPERATURE: 98 F | HEIGHT: 72 IN | RESPIRATION RATE: 18 BRPM | DIASTOLIC BLOOD PRESSURE: 69 MMHG | SYSTOLIC BLOOD PRESSURE: 138 MMHG

## 2018-11-05 DIAGNOSIS — R31.0 GROSS HEMATURIA: Primary | ICD-10-CM

## 2018-11-05 DIAGNOSIS — N40.0 BPH WITHOUT OBSTRUCTION/LOWER URINARY TRACT SYMPTOMS: ICD-10-CM

## 2018-11-05 DIAGNOSIS — R97.20 ELEVATED PSA: ICD-10-CM

## 2018-11-05 DIAGNOSIS — E29.1 HYPOGONADISM IN MALE: ICD-10-CM

## 2018-11-05 LAB
BILIRUB SERPL-MCNC: ABNORMAL MG/DL
BLOOD URINE, POC: ABNORMAL
COLOR, POC UA: YELLOW
GLUCOSE UR QL STRIP: ABNORMAL
KETONES UR QL STRIP: ABNORMAL
LEUKOCYTE ESTERASE URINE, POC: ABNORMAL
NITRITE, POC UA: ABNORMAL
PH, POC UA: 5
PROTEIN, POC: 30
SPECIFIC GRAVITY, POC UA: 1.03
UROBILINOGEN, POC UA: ABNORMAL

## 2018-11-05 PROCEDURE — 87086 URINE CULTURE/COLONY COUNT: CPT

## 2018-11-05 PROCEDURE — 88112 CYTOPATH CELL ENHANCE TECH: CPT | Mod: 26,,, | Performed by: PATHOLOGY

## 2018-11-05 PROCEDURE — 81002 URINALYSIS NONAUTO W/O SCOPE: CPT | Mod: PBBFAC,PN | Performed by: UROLOGY

## 2018-11-05 PROCEDURE — 88112 CYTOPATH CELL ENHANCE TECH: CPT | Performed by: PATHOLOGY

## 2018-11-05 PROCEDURE — 99213 OFFICE O/P EST LOW 20 MIN: CPT | Mod: PBBFAC,PN | Performed by: UROLOGY

## 2018-11-05 PROCEDURE — 99214 OFFICE O/P EST MOD 30 MIN: CPT | Mod: S$PBB,,, | Performed by: UROLOGY

## 2018-11-05 PROCEDURE — 99999 PR PBB SHADOW E&M-EST. PATIENT-LVL III: CPT | Mod: PBBFAC,,, | Performed by: UROLOGY

## 2018-11-05 RX ORDER — WARFARIN SODIUM 5 MG/1
5 TABLET ORAL DAILY
Qty: 30 TABLET | Refills: 0 | Status: SHIPPED | OUTPATIENT
Start: 2018-11-05 | End: 2018-12-06 | Stop reason: SDUPTHER

## 2018-11-06 LAB — BACTERIA UR CULT: NORMAL

## 2018-11-16 ENCOUNTER — TELEPHONE (OUTPATIENT)
Dept: HEMATOLOGY/ONCOLOGY | Facility: CLINIC | Age: 58
End: 2018-11-16

## 2018-12-06 DIAGNOSIS — I82.431 ACUTE DEEP VEIN THROMBOSIS (DVT) OF POPLITEAL VEIN OF RIGHT LOWER EXTREMITY: ICD-10-CM

## 2018-12-07 RX ORDER — WARFARIN SODIUM 5 MG/1
TABLET ORAL
Qty: 30 TABLET | Refills: 0 | Status: SHIPPED | OUTPATIENT
Start: 2018-12-07 | End: 2019-03-28 | Stop reason: SDUPTHER

## 2018-12-12 DIAGNOSIS — D50.0 IRON DEFICIENCY ANEMIA DUE TO CHRONIC BLOOD LOSS: ICD-10-CM

## 2018-12-13 RX ORDER — IRON,CARBONYL/ASCORBIC ACID 100-250 MG
TABLET ORAL
Qty: 30 TABLET | Refills: 0 | Status: SHIPPED | OUTPATIENT
Start: 2018-12-13 | End: 2019-01-15 | Stop reason: SDUPTHER

## 2018-12-29 ENCOUNTER — TELEPHONE (OUTPATIENT)
Dept: UROLOGY | Facility: CLINIC | Age: 58
End: 2018-12-29

## 2019-01-15 DIAGNOSIS — D50.0 IRON DEFICIENCY ANEMIA DUE TO CHRONIC BLOOD LOSS: ICD-10-CM

## 2019-01-16 RX ORDER — IRON,CARBONYL/ASCORBIC ACID 100-250 MG
TABLET ORAL
Qty: 30 TABLET | Refills: 0 | Status: SHIPPED | OUTPATIENT
Start: 2019-01-16 | End: 2019-02-16 | Stop reason: SDUPTHER

## 2019-01-25 ENCOUNTER — TELEPHONE (OUTPATIENT)
Dept: HEMATOLOGY/ONCOLOGY | Facility: CLINIC | Age: 59
End: 2019-01-25

## 2019-01-25 NOTE — TELEPHONE ENCOUNTER
Called to see if the pt had any labs done prior to f/u appt.    Pt labs were done @ [facility] and are printed for the f/u visit.

## 2019-01-27 NOTE — PROGRESS NOTES
[facility] Hematology/Oncology  PROGRESS NOTE      Subjective:       Patient ID:   NAME: Mo Escobar : 1960     58 y.o. male    Referring Doc:  Jose A/Eleonora Renteria  Other Physicians: Travis Best Pinsky    Chief Complaint:  Clot disorder    History of Present Illness:     Patient returns today for a regularly scheduled follow-up visit.  The patient is doing ok with no new issues. He has been under the care of Dr Mcconnell with  and has been doing good with the bladder issues. No excessive bleeding or bruising. He is still on coumadin per our direction but he has been getting the INR done with his daughter's office .  No CP,SOB, HA's or N/V. He is here by himself.           ROS:   GEN: normal without any fever, night sweats or weight loss  HEENT: normal with no HA's, sore throat, stiff neck, changes in vision  CV: normal with no CP, SOB, PND, HERRERA or orthopnea  PULM: normal with no SOB, cough, hemoptysis, sputum or pleuritic pain  GI: normal with no abdominal pain, nausea, vomiting, constipation, diarrhea, melanotic stools, BRBPR, or hematemesis  : normal with no hematuria, dysuria  BREAST: normal with no mass, discharge, pain  SKIN: normal with no rash, erythema, bruising, or swelling    Allergies:  Review of patient's allergies indicates:  No Known Allergies    Medications:    Current Outpatient Medications:     DULoxetine (CYMBALTA) 60 MG capsule, Take 60 mg by mouth once daily., Disp: , Rfl:     FOLBIC 2.5-25-2 mg Tab, TAKE ONE TABLET BY MOUTH ONCE DAILY, Disp: 30 tablet, Rfl: 0    iron-vitamin C 100-250 mg, ICAR-C, 100-250 mg Tab, TAKE ONE TABLET BY MOUTH ONCE DAILY, Disp: 30 tablet, Rfl: 0    levothyroxine (SYNTHROID) 112 MCG tablet, Take 112 mcg by mouth once daily., Disp: , Rfl:     warfarin (COUMADIN) 5 MG tablet, TAKE ONE TABLET BY MOUTH ONCE DAILY, Disp: 30 tablet, Rfl: 0    cholecalciferol, vitamin D3, (VITAMIN D3) 5,000 unit Tab, Take 5,000 Units by mouth once daily., Disp: , Rfl:      finasteride (PROSCAR) 5 mg tablet, Take 1 tablet (5 mg total) by mouth once daily., Disp: 30 tablet, Rfl: 11    testosterone enanthate (DELATESTRYL) 200 mg/mL injection, Inject 200 mg into the muscle every 7 days., Disp: , Rfl:     warfarin (COUMADIN) 4 MG tablet, TAKE ONE AND ONE HALF TABLET BY MOUTH DAILY., Disp: 60 tablet, Rfl: 0    warfarin (COUMADIN) 6 MG tablet, Take 6 mg by mouth once daily., Disp: , Rfl:     PMHx/PSHx Updates:  See patient's last visit with me on  7/25/2018.  See H&P on 8/21/2014        Pathology:  Cancer Staging  No matching staging information was found for the patient.          Objective:     Vitals:  Blood pressure 128/82, pulse 74, temperature 98.2 °F (36.8 °C), resp. rate 20, weight 132.9 kg (292 lb 14.4 oz).    Physical Examination:   GEN: no apparent distress, comfortable; AAOx3  HEAD: atraumatic and normocephalic  EYES: no pallor, no icterus, PERRLA  ENT: OMM, no pharyngeal erythema, external ears WNL; no nasal discharge; no thrush  NECK: no masses, thyroid normal, trachea midline, no LAD/LN's, supple  CV: RRR with no murmur; normal pulse; normal S1 and S2; no pedal edema  CHEST: Normal respiratory effort; CTAB; normal breath sounds; no wheeze or crackles  ABDOM: nontender and nondistended; soft; normal bowel sounds; no rebound/guarding  MUSC/Skeletal: ROM normal; no crepitus; joints normal; no deformities or arthropathy  EXTREM: no clubbing, cyanosis, inflammation or swelling  SKIN: no rashes, lesions, ulcers, petechiae or subcutaneous nodules  : no barker  NEURO: grossly intact; motor/sensory WNL; AAOx3; no tremors  PSYCH: normal mood, affect and behavior  LYMPH: normal cervical, supraclavicular, axillary and groin LN's            Labs:     1/16/2019  INR at 2.8      Radiology/Diagnostic Studies:    Ct Abdomen Pelvis  Without Contrast    Result Date: 9/7/2017  CT Abdomen and Pelvis without contrast Comparison: 06/09/2017 Technique:  Helically acquired axial images of the  abdomen and pelvis were acquired without contrast.   Reformatted coronal and sagittal images provided. FINDINGS: The liver, gallbladder, pancreas, spleen, and adrenal glands are unremarkable. There is no evidence for nephrolithiasis or hydroureteronephrosis.  A Cotter catheter is present within the urinary bladder, which appears thickwalled and with hazy surrounding fat stranding.  No visible thrombus is seen within the bladder.  The prostate gland appears enlarged.  A prominent 9 mm lymph node is present to the right atrium the bladder. A 3 mm nodule is present within the right lower lobe.  Moderate degenerative changes present at L5-S1.    1.  Decompressed ureter bladder with Cotter catheter in place without evidence for intraluminal blood clot.  The bladder wall appears thickened and with hazy stranding fat stranding suggesting cystitis. 2.  3 mm right lower lobe nodule.  In the absence of risk factors, no followup indicated.  Otherwise optional chest CT at 12 months may be performed. Electronically signed by: Liang Ceron MD Date:     09/07/17 Time:    16:19       I have reviewed all available lab results and radiology reports.    Assessment/Plan:   (1) 57 y.o. male with diagnosis of chronic clot disorder with prior bilateral pulmonary emboli  - he has underlying clot disorder with MTHFR-A and MTHFR-C heterozygous condition  - he has been on coumadin long term per direction of Dr Best with cardiology  - INR's on 1/16/2019 was 2.8     (2) Recurrent hematuria issues - he was previously hospitalized at [facility] and was previously under the care of Dr Sanchez and is now under the care of Dr Mcconnell with   - prior cyctoscope and TURP with Dr Mcconnell; everything looked good per patient  - minor hematuria residual on occasion     (3) Recurrent clot event with DVT in the right popliteal vein  - he has been on coumadin per direction of Dr Best     (4) Pulmonary HTN hx - followed by Dr Robles with Pulmonary     (5)  Hx/of gout issues    (6) residual microcytic anemia - hgb at 12.4 and ferritin low at 13  - he is on oral iron; iron was WNL at 38        1. Acute deep vein thrombosis (DVT) of popliteal vein of right lower extremity     2. Chronic saddle pulmonary embolism without acute cor pulmonale     3. Microcytic anemia     4. Anemia due to chronic blood loss     5. Heterozygous MTHFR mutation [id number]     6. Heterozygous MTHFR mutation [id number]         PLAN:  1. Check labs every 3 months  2. Check iron panel every 3 months; INR q 4 weeks - encouraged compliance and if he continues to have it done via his daughter's place of employement he needs to make sure we get the results  3. Continue ICAR-C  4. F/u with PCP,  and Card  5. Will continue coumadin management per patient's request    RTC in 6 months  Fax note to Jayesh Naranjo Hickey, [place], labarre    Discussion:     I have explained all of the above in detail and the patient understands all of the current recommendation(s). I have answered all of their questions to the best of my ability and to their complete satisfaction.   The patient is to continue with the current management plan.            Electronically signed by Dion Garrido MD

## 2019-01-28 ENCOUNTER — OFFICE VISIT (OUTPATIENT)
Dept: HEMATOLOGY/ONCOLOGY | Facility: CLINIC | Age: 59
End: 2019-01-28
Payer: MEDICARE

## 2019-01-28 VITALS
HEART RATE: 74 BPM | BODY MASS INDEX: 39.72 KG/M2 | SYSTOLIC BLOOD PRESSURE: 128 MMHG | RESPIRATION RATE: 20 BRPM | WEIGHT: 292.88 LBS | TEMPERATURE: 98 F | DIASTOLIC BLOOD PRESSURE: 82 MMHG

## 2019-01-28 DIAGNOSIS — Z15.89 HETEROZYGOUS MTHFR MUTATION A1298C: ICD-10-CM

## 2019-01-28 DIAGNOSIS — Z15.89 HETEROZYGOUS MTHFR MUTATION C677T: ICD-10-CM

## 2019-01-28 DIAGNOSIS — D50.0 ANEMIA DUE TO CHRONIC BLOOD LOSS: ICD-10-CM

## 2019-01-28 DIAGNOSIS — I27.82 CHRONIC SADDLE PULMONARY EMBOLISM WITHOUT ACUTE COR PULMONALE: ICD-10-CM

## 2019-01-28 DIAGNOSIS — I82.431 ACUTE DEEP VEIN THROMBOSIS (DVT) OF POPLITEAL VEIN OF RIGHT LOWER EXTREMITY: Primary | ICD-10-CM

## 2019-01-28 DIAGNOSIS — D50.9 MICROCYTIC ANEMIA: ICD-10-CM

## 2019-01-28 DIAGNOSIS — I26.92 CHRONIC SADDLE PULMONARY EMBOLISM WITHOUT ACUTE COR PULMONALE: ICD-10-CM

## 2019-01-28 PROCEDURE — 99213 PR OFFICE/OUTPT VISIT, EST, LEVL III, 20-29 MIN: ICD-10-PCS | Mod: ,,, | Performed by: INTERNAL MEDICINE

## 2019-01-28 PROCEDURE — 99213 OFFICE O/P EST LOW 20 MIN: CPT | Mod: ,,, | Performed by: INTERNAL MEDICINE

## 2019-01-28 RX ORDER — DULOXETIN HYDROCHLORIDE 60 MG/1
60 CAPSULE, DELAYED RELEASE ORAL DAILY
COMMUNITY
End: 2019-09-03

## 2019-01-28 NOTE — LETTER
January 28, 2019      Alphonso Naranjo MD  [address]  [address]  [place] [address]           [facility] - Hematology Oncology  [address]  [address]  [facility] [address]  Phone: [phone]  Fax: [phone]          Patient: Mo Escobar   MR Number: [id number]   YOB: 1960   Date of Visit: 1/28/2019       Dear Dr. Alphonso Naranjo:    Thank you for referring Mo Escobar to me for evaluation. Attached you will find relevant portions of my assessment and plan of care.    If you have questions, please do not hesitate to call me. I look forward to following Mo Escobar along with you.    Sincerely,    Dion Garrido MD    Enclosure  CC:  No Recipients    If you would like to receive this communication electronically, please contact [email or web address] or [phone] to request more information on [employer] Link access.    For providers and/or their staff who would like to refer a patient to [facility], please contact us through our one-stop-shop provider referral line, [facility], at [phone].    If you feel you have received this communication in error or would no longer like to receive these types of communications, please e-mail [email or web address]

## 2019-02-16 DIAGNOSIS — D50.0 IRON DEFICIENCY ANEMIA DUE TO CHRONIC BLOOD LOSS: ICD-10-CM

## 2019-02-18 ENCOUNTER — TELEPHONE (OUTPATIENT)
Dept: UROLOGY | Facility: CLINIC | Age: 59
End: 2019-02-18

## 2019-02-18 RX ORDER — IRON,CARBONYL/ASCORBIC ACID 100-250 MG
TABLET ORAL
Qty: 30 TABLET | Refills: 0 | Status: SHIPPED | OUTPATIENT
Start: 2019-02-18 | End: 2019-03-26 | Stop reason: SDUPTHER

## 2019-02-18 NOTE — TELEPHONE ENCOUNTER
Spoke w pt he was informed of lab results per Dr Mcconnell and suggestion for f/u pt voiced ok and understanding

## 2019-02-18 NOTE — TELEPHONE ENCOUNTER
1/16/19 [facility]    PSA 5.8 (10.2% free)  INR 2.8  T 128 (), free T 3.3 (7.2-24)      Can notify pt that with negative confirm mdx and psa down from previous, will continue to monitor and can keep f/u as planned. Though Testosterone is low, would not replace at this time given risk to prostate and will reeval at next scheduled office visit

## 2019-03-26 DIAGNOSIS — D50.0 IRON DEFICIENCY ANEMIA DUE TO CHRONIC BLOOD LOSS: ICD-10-CM

## 2019-03-26 RX ORDER — IRON,CARBONYL/ASCORBIC ACID 100-250 MG
TABLET ORAL
Qty: 30 TABLET | Refills: 0 | Status: ON HOLD | OUTPATIENT
Start: 2019-03-26 | End: 2019-08-27

## 2019-03-28 DIAGNOSIS — I82.431 ACUTE DEEP VEIN THROMBOSIS (DVT) OF POPLITEAL VEIN OF RIGHT LOWER EXTREMITY: ICD-10-CM

## 2019-03-31 RX ORDER — WARFARIN SODIUM 5 MG/1
TABLET ORAL
Qty: 30 TABLET | Refills: 0 | Status: SHIPPED | OUTPATIENT
Start: 2019-03-31 | End: 2019-05-17 | Stop reason: SDUPTHER

## 2019-04-08 ENCOUNTER — TELEPHONE (OUTPATIENT)
Dept: HEMATOLOGY/ONCOLOGY | Facility: CLINIC | Age: 59
End: 2019-04-08

## 2019-04-08 NOTE — TELEPHONE ENCOUNTER
Called patient went over directions on when to start lovenox and when to resume lovenox and coumadin after surgery      ----- Message from Jinny Velazquez sent at 4/5/2019  1:56 PM CDT -----  Eye Surgery on the 17th  Needs instructions to get off coumadin and on to lovenox    Pharmacy:  [facility] on [address]    # [phone]

## 2019-05-05 NOTE — PROGRESS NOTES
[facility] Urology Progress Note    Mo Escobar is a 59 y.o. male who presents for BPH follow up    Hx of BPH s/p TURP in 2014 but had persistent gross hematuria intermittently ever since, which worsened earlier this year as he is chronically anticoagulated for clotting disorder.   He had admission for GH with clot retention mid 2017 and PSA which had been historically elevated for his age.      Bridged with lovenox for cystoscopic evaluation of his GH and prostate biopsy on 6/13/17. Psa 9.6 (34% free) 6/2017.  92g prostate by ultrasound with cystoscopic evidence of outlet obstruction with significant regrowth of lateral lobes and very large significant abnormal hypervascular edematous median lobe with calcifications. CT urogram negative except for prostatomegaly. Biopsy pathology negative for malignancy, and only demonstrated BPH.      6/22/17 - TURP/TULVP of his large erythematous obstructing prostate. Resected portion demonstrated pathologic BPH (20g).  He did pass a fill-and-pull voiding trial on 6/27/17 and resumed chronic anticoagulation with Coumadin.  7/31/17 - voiding well w/ good full stream, which he didn't even have after his first TURP. Initial urgency postop had nearly resolved, NTF down to 1x, PVR 5cc.  9/7/17 - recurrent GH with clot retention after riding mower and a ride in a flat bottom boat - manually irrigated and CT negative after w/ no residual clot, barker removed 24h later  10/30/17 - no urinary complaints, DCed proscar 2 weeks prior. Denied all LUTS emptied well, PVR 31cc, one interim epidosde GH cleared with hydration     4/30/18: intermittent GH every few mos especially if active, o/w urinating great with good flow and stream. AUA SS: 5/1 (2: freq, intermittency; 1 weak stream). PVR 62cc  Advised compliance checking coumadin levels, restarted finasteride 2/2 interim sporadic GH     11/5/18: only took 30d finasteride. Still has occasional GH with clots every few months. No dysuria. When no  blood, good stream/flow. AUA SS: 9/3 (3 intermittency, 2 straining, 1: emptying, freq, urgency, weak stream)  PSA 5/16/18 at clinical path labs of 4.75 (on finasteride). PSA 10/24/18 at [facility] of 8.7. Had been on 200mg IM TRT but held with psa results. No blood in urine after dcing TRT. PVR 60cc  LEORA unable to palpate prostate 2/2 body habitus  Confirm MDx analysis of prostate biopsy negative    1/6/19: PSA 5.8 (10.2% free), INR 2.8, T 128 (), free T 3.3 (7.2-24). Advised continuing to hold TRT    He returns today noting:  Still the same thing.  Every few months will have blood  Last time was in March, and before that was December  No inciting factors he can identify.  Can be a few weeks to a month every void, then resolves.   Last time it happened, had big clots he needed to pass/push out.  His baseline LBP intensifies when he is passing clots, a lot of it is on the left hand side.  Otherwise doing perfect, no nocturia, feels like he empties.  AUA SS: 3/2 mostly satisifed (1: intermittency, weak stream, straining)  Has been off  Testosterone.  Has not taken finasteride since initial fill  Udip negative including for blood  No dosing changes of coumadin in interim  INR has been stable  PVR by bladder scan is 63cc    ROS: A comprehensive 10 system review was performed and is negative except as noted above in HPI    PHYSICAL EXAM:    Vitals:    05/06/19 0906   BP: 131/73   Pulse: (!) 59   Resp: 18   Temp: 98.8 °F (37.1 °C)     Body mass index is 39.56 kg/m². Weight: 132.3 kg (291 lb 10.7 oz) Height: 6' (182.9 cm)       General: Alert, cooperative, no distress, appears stated age   Head: Normocephalic, without obvious abnormality, atraumatic   Eyes: PERRL, conjunctiva/corneas clear   Lungs: Respirations unlabored   Heart: Warm and well perfused   Abdomen: soft NT ND  Extremities: Extremities normal, atraumatic, no cyanosis or edema   Skin: Skin color, texture, turgor normal, no rashes or lesions   Psych:  Appropriate   Neurologic: Non-focal       Recent Results (from the past 336 hour(s))   POCT URINE DIPSTICK WITHOUT MICROSCOPE    Collection Time: 05/06/19  9:08 AM   Result Value Ref Range    Color, UA yellow     Spec Grav UA 1.030     pH, UA 5.5     WBC, UA neg     Nitrite, UA neg     Protein 100     Glucose, UA neg     Ketones, UA neg     Urobilinogen, UA neg     Bilirubin neg     Blood, UA neg        ASSESSMENT   1. Benign prostatic hyperplasia without lower urinary tract symptoms  POCT URINE DIPSTICK WITHOUT MICROSCOPE    POCT Bladder Scan    Basic metabolic panel    finasteride (PROSCAR) 5 mg tablet   2. History of gross hematuria  Basic metabolic panel    Cytology, urine    finasteride (PROSCAR) 5 mg tablet   3. Elevated PSA  PSA, total and free       Plan    His urinary symptoms are well controlled and his psa is stable. Still has low free psa. Given negative past prostate biopsy, benign TURP pathology, and negative confirm MD X which has a 90% negative predictive value for all types of prostate cancer, not too concerned.  However his PSA does remain elevated and his free PSA does remain low and warrants continued follow up. We discussed the value of 3T prostate MRI and discussed proceeding with it.   If any concerning index lesion noted, can rebiopsy targeting those lesions, however if MRI negative/benign, would just continue to follow psa closely and would be ok rechecking annually with free and total.  Given his interim sporadically recurrent gross hematuria, most likely of a prostatic source given negative workup in the past for anything different, did advised him to start finasteride and take it daily as it has a vascular contraction property in line with gland shrinkage to help decrease prostatic source bleeding.  Will interpret future PSA in context of finasteride. Will recheck urine cytology given interval GH.  Will chart check 3 T MRI of prostate, and call patient with any further recommendations,  otherwise plan to see in 1 year with PSA prior.  Patient has been encouraged that if he does have another episode of recurrent gross hematuria with clots that he should call the office and come in for evaluation.    25 mins spent in encounter, over half in counseling

## 2019-05-06 ENCOUNTER — OFFICE VISIT (OUTPATIENT)
Dept: UROLOGY | Facility: CLINIC | Age: 59
End: 2019-05-06
Payer: MEDICARE

## 2019-05-06 ENCOUNTER — LAB VISIT (OUTPATIENT)
Dept: LAB | Facility: HOSPITAL | Age: 59
End: 2019-05-06
Attending: UROLOGY
Payer: MEDICARE

## 2019-05-06 VITALS
BODY MASS INDEX: 39.51 KG/M2 | HEART RATE: 59 BPM | HEIGHT: 72 IN | SYSTOLIC BLOOD PRESSURE: 131 MMHG | TEMPERATURE: 99 F | RESPIRATION RATE: 18 BRPM | WEIGHT: 291.69 LBS | DIASTOLIC BLOOD PRESSURE: 73 MMHG

## 2019-05-06 DIAGNOSIS — N40.0 BENIGN PROSTATIC HYPERPLASIA WITHOUT LOWER URINARY TRACT SYMPTOMS: Primary | ICD-10-CM

## 2019-05-06 DIAGNOSIS — R97.20 ELEVATED PSA: ICD-10-CM

## 2019-05-06 DIAGNOSIS — N40.0 BENIGN PROSTATIC HYPERPLASIA WITHOUT LOWER URINARY TRACT SYMPTOMS: ICD-10-CM

## 2019-05-06 DIAGNOSIS — Z87.898 HISTORY OF GROSS HEMATURIA: ICD-10-CM

## 2019-05-06 LAB
ANION GAP SERPL CALC-SCNC: 8 MMOL/L (ref 8–16)
BILIRUB SERPL-MCNC: ABNORMAL MG/DL
BLOOD URINE, POC: ABNORMAL
BUN SERPL-MCNC: 11 MG/DL (ref 6–20)
CALCIUM SERPL-MCNC: 9.9 MG/DL (ref 8.7–10.5)
CHLORIDE SERPL-SCNC: 105 MMOL/L (ref 95–110)
CO2 SERPL-SCNC: 26 MMOL/L (ref 23–29)
COLOR, POC UA: YELLOW
CREAT SERPL-MCNC: 0.9 MG/DL (ref 0.5–1.4)
EST. GFR  (AFRICAN AMERICAN): >60 ML/MIN/1.73 M^2
EST. GFR  (NON AFRICAN AMERICAN): >60 ML/MIN/1.73 M^2
GLUCOSE SERPL-MCNC: 103 MG/DL (ref 70–110)
GLUCOSE UR QL STRIP: ABNORMAL
KETONES UR QL STRIP: ABNORMAL
LEUKOCYTE ESTERASE URINE, POC: ABNORMAL
NITRITE, POC UA: ABNORMAL
PH, POC UA: 5.5
POTASSIUM SERPL-SCNC: 4.2 MMOL/L (ref 3.5–5.1)
PROTEIN, POC: 100
SODIUM SERPL-SCNC: 139 MMOL/L (ref 136–145)
SPECIFIC GRAVITY, POC UA: 1.03
UROBILINOGEN, POC UA: ABNORMAL

## 2019-05-06 PROCEDURE — 99999 PR PBB SHADOW E&M-EST. PATIENT-LVL III: CPT | Mod: PBBFAC,,, | Performed by: UROLOGY

## 2019-05-06 PROCEDURE — 81002 URINALYSIS NONAUTO W/O SCOPE: CPT | Mod: PBBFAC,PN | Performed by: UROLOGY

## 2019-05-06 PROCEDURE — 88112 CYTOPATH CELL ENHANCE TECH: CPT | Performed by: PATHOLOGY

## 2019-05-06 PROCEDURE — 88112 CYTOLOGY SPECIMEN-URINE: ICD-10-PCS | Mod: 26,,, | Performed by: PATHOLOGY

## 2019-05-06 PROCEDURE — 99214 OFFICE O/P EST MOD 30 MIN: CPT | Mod: S$PBB,,, | Performed by: UROLOGY

## 2019-05-06 PROCEDURE — 99214 PR OFFICE/OUTPT VISIT, EST, LEVL IV, 30-39 MIN: ICD-10-PCS | Mod: S$PBB,,, | Performed by: UROLOGY

## 2019-05-06 PROCEDURE — 36415 COLL VENOUS BLD VENIPUNCTURE: CPT

## 2019-05-06 PROCEDURE — 80048 BASIC METABOLIC PNL TOTAL CA: CPT

## 2019-05-06 PROCEDURE — 99213 OFFICE O/P EST LOW 20 MIN: CPT | Mod: PBBFAC,PN | Performed by: UROLOGY

## 2019-05-06 PROCEDURE — 88112 CYTOPATH CELL ENHANCE TECH: CPT | Mod: 26,,, | Performed by: PATHOLOGY

## 2019-05-06 PROCEDURE — 99999 PR PBB SHADOW E&M-EST. PATIENT-LVL III: ICD-10-PCS | Mod: PBBFAC,,, | Performed by: UROLOGY

## 2019-05-06 RX ORDER — FINASTERIDE 5 MG/1
5 TABLET, FILM COATED ORAL DAILY
Qty: 30 TABLET | Refills: 11 | Status: ON HOLD | OUTPATIENT
Start: 2019-05-06 | End: 2019-08-27

## 2019-05-17 DIAGNOSIS — I82.431 ACUTE DEEP VEIN THROMBOSIS (DVT) OF POPLITEAL VEIN OF RIGHT LOWER EXTREMITY: ICD-10-CM

## 2019-05-20 ENCOUNTER — TELEPHONE (OUTPATIENT)
Dept: HEMATOLOGY/ONCOLOGY | Facility: CLINIC | Age: 59
End: 2019-05-20

## 2019-05-20 ENCOUNTER — TELEPHONE (OUTPATIENT)
Dept: UROLOGY | Facility: CLINIC | Age: 59
End: 2019-05-20

## 2019-05-20 ENCOUNTER — CLINICAL SUPPORT (OUTPATIENT)
Dept: UROLOGY | Facility: CLINIC | Age: 59
End: 2019-05-20
Payer: MEDICARE

## 2019-05-20 DIAGNOSIS — R33.9 URINARY RETENTION: Primary | ICD-10-CM

## 2019-05-20 LAB
BILIRUB SERPL-MCNC: NEGATIVE MG/DL
BLOOD URINE, POC: ABNORMAL
COLOR, POC UA: ABNORMAL
GLUCOSE UR QL STRIP: NEGATIVE
KETONES UR QL STRIP: NEGATIVE
LEUKOCYTE ESTERASE URINE, POC: NEGATIVE
NITRITE, POC UA: NEGATIVE
PH, POC UA: 5
POC RESIDUAL URINE VOLUME: 595 ML (ref 0–100)
PROTEIN, POC: ABNORMAL
SPECIFIC GRAVITY, POC UA: 1.01
UROBILINOGEN, POC UA: 0.2

## 2019-05-20 PROCEDURE — 87086 URINE CULTURE/COLONY COUNT: CPT

## 2019-05-20 PROCEDURE — 81002 URINALYSIS NONAUTO W/O SCOPE: CPT | Mod: PBBFAC,PN

## 2019-05-20 PROCEDURE — 99499 UNLISTED E&M SERVICE: CPT | Mod: S$PBB,,, | Performed by: UROLOGY

## 2019-05-20 PROCEDURE — 51703 PR INSERTION OF TEMPORARY INDWELLING BLADDER CATHETERIZATION, COMPLICA: ICD-10-PCS | Mod: S$PBB,,, | Performed by: UROLOGY

## 2019-05-20 PROCEDURE — 51703 INSERT BLADDER CATH COMPLEX: CPT | Mod: PBBFAC,PN

## 2019-05-20 PROCEDURE — 51798 US URINE CAPACITY MEASURE: CPT | Mod: PBBFAC,PN

## 2019-05-20 PROCEDURE — 99499 NO LOS: ICD-10-PCS | Mod: S$PBB,,, | Performed by: UROLOGY

## 2019-05-20 PROCEDURE — 51703 INSERT BLADDER CATH COMPLEX: CPT | Mod: S$PBB,,, | Performed by: UROLOGY

## 2019-05-20 RX ORDER — WARFARIN SODIUM 5 MG/1
TABLET ORAL
Qty: 30 TABLET | Refills: 0 | Status: SHIPPED | OUTPATIENT
Start: 2019-05-20 | End: 2019-09-03 | Stop reason: SDUPTHER

## 2019-05-20 RX ORDER — TERAZOSIN 5 MG/1
5 CAPSULE ORAL NIGHTLY
Qty: 30 CAPSULE | Refills: 0 | Status: CANCELLED | OUTPATIENT
Start: 2019-05-20 | End: 2020-05-19

## 2019-05-20 NOTE — PROGRESS NOTES
Patient reports that he drank some beer Saturday.  He woke Sunday morning with difficulty urinating.    Patient here today for udip and pvr.      Per VORB from Dr. Mcconnell, catheter placed.     Patient draped and prepped in sterile fashion.   16Fr coude catheter placed into the bladder with no difficulty.    Balloon filled with 10ml saline  Bladder drained of 600ml.  Catheter attached to leg bag.    Leg bag secured to right leg with straps.      Patient left the office in satisfactory condition.      VORB from Dr. Mcconnell to call in Flomax 0.4mg #30 one tablet by mouth daily.    Patient says he cannot take Flomax due to his cataracts.      Dr. Mcconnell advised.  VORB to call in Terazosin 5mg #30 one tablet daily.

## 2019-05-20 NOTE — TELEPHONE ENCOUNTER
----- Message from Palmira Hwang sent at 5/20/2019 11:12 AM CDT -----    Pt is  having  difficultly  emptying  Bladder  And  Would  Like to  Speak to the  Nurse / [phone]

## 2019-05-20 NOTE — TELEPHONE ENCOUNTER
Lower abdomen pain/pressure.  Has to strain to get urine out and his flow is very weak.    Saturday seafood boil and drank 5-6 beers.  When he woke Sunday morning, he had trouble urinating.    Patient is taking Finasteride.    Please advise.

## 2019-05-20 NOTE — TELEPHONE ENCOUNTER
Called patient told him his INR is 2.8 and to stay on 5 mg coumadin  And to recheck INR in 4 weeks

## 2019-05-22 ENCOUNTER — TELEPHONE (OUTPATIENT)
Dept: UROLOGY | Facility: CLINIC | Age: 59
End: 2019-05-22

## 2019-05-22 NOTE — TELEPHONE ENCOUNTER
----- Message from Bea Martínez sent at 5/22/2019  3:08 PM CDT -----  Contact: Mo pt  Type:  Test Results    Who Called:  Mo  Name of Test (Lab/Mammo/Etc): MRI of Prostate  Date of Test:  Last week  Ordering Provider:  Jayesh  Where the test was performed[facility]  Best Call Back Number:  [phone]  Additional Information:  Pls call pt to adv of results

## 2019-05-22 NOTE — TELEPHONE ENCOUNTER
Patient advised that the results were just received and MD will need to review.  Will call with results/recommendations once Dr. Mcconnell reviews.

## 2019-05-23 LAB — BACTERIA UR CULT: NO GROWTH

## 2019-05-24 ENCOUNTER — CLINICAL SUPPORT (OUTPATIENT)
Dept: UROLOGY | Facility: CLINIC | Age: 59
End: 2019-05-24
Payer: MEDICARE

## 2019-05-24 DIAGNOSIS — R33.8 CLOT RETENTION OF URINE: Primary | ICD-10-CM

## 2019-05-24 PROCEDURE — 99213 OFFICE O/P EST LOW 20 MIN: CPT | Mod: S$PBB,25,, | Performed by: UROLOGY

## 2019-05-24 PROCEDURE — 99999 PR PBB SHADOW E&M-EST. PATIENT-LVL I: ICD-10-PCS | Mod: PBBFAC,,,

## 2019-05-24 PROCEDURE — 99211 OFF/OP EST MAY X REQ PHY/QHP: CPT | Mod: PBBFAC,PN,25

## 2019-05-24 PROCEDURE — 99213 PR OFFICE/OUTPT VISIT, EST, LEVL III, 20-29 MIN: ICD-10-PCS | Mod: S$PBB,25,, | Performed by: UROLOGY

## 2019-05-24 PROCEDURE — 51703 INSERT BLADDER CATH COMPLEX: CPT | Mod: S$PBB,,, | Performed by: UROLOGY

## 2019-05-24 PROCEDURE — 99999 PR PBB SHADOW E&M-EST. PATIENT-LVL I: CPT | Mod: PBBFAC,,,

## 2019-05-24 PROCEDURE — 51703 INSERT BLADDER CATH COMPLEX: CPT | Mod: PBBFAC,PN

## 2019-05-24 PROCEDURE — 51703 PR INSERTION OF TEMPORARY INDWELLING BLADDER CATHETERIZATION, COMPLICA: ICD-10-PCS | Mod: S$PBB,,, | Performed by: UROLOGY

## 2019-05-24 PROCEDURE — 51700 IRRIGATION OF BLADDER: CPT | Mod: PBBFAC,PN | Performed by: UROLOGY

## 2019-05-24 NOTE — PROGRESS NOTES
Patient presents to clinic with c/o blood and clots in catheter bag.     75ml bright red blood in bag with clots.     VORB from Dr. Mcconnell to change catheter to 20Fr.    10ml saline removed from catheter balloon. Catheter removed.      Patient draped and prepped in sterile fashion.   20Fr catheter placed into bladder with no difficulty.    10ml saline placed in balloon.    Per Dr. Mcconnell's verbal order, placed 300ml sterile water into the bladder via catheter port, and attached to leg bag.

## 2019-05-25 ENCOUNTER — HOSPITAL ENCOUNTER (EMERGENCY)
Facility: HOSPITAL | Age: 59
Discharge: HOME | End: 2019-05-25
Attending: EMERGENCY MEDICINE
Payer: MEDICARE

## 2019-05-25 VITALS
RESPIRATION RATE: 20 BRPM | TEMPERATURE: 98 F | HEART RATE: 68 BPM | WEIGHT: 285 LBS | BODY MASS INDEX: 38.6 KG/M2 | DIASTOLIC BLOOD PRESSURE: 68 MMHG | OXYGEN SATURATION: 97 % | SYSTOLIC BLOOD PRESSURE: 149 MMHG | HEIGHT: 72 IN

## 2019-05-25 DIAGNOSIS — T83.9XXA PROBLEM WITH FOLEY CATHETER, INITIAL ENCOUNTER: ICD-10-CM

## 2019-05-25 DIAGNOSIS — R31.0 GROSS HEMATURIA: Primary | ICD-10-CM

## 2019-05-25 PROCEDURE — 99284 EMERGENCY DEPT VISIT MOD MDM: CPT | Mod: 25

## 2019-05-25 PROCEDURE — 96375 TX/PRO/DX INJ NEW DRUG ADDON: CPT

## 2019-05-25 PROCEDURE — 63600175 PHARM REV CODE 636 W HCPCS: Performed by: EMERGENCY MEDICINE

## 2019-05-25 PROCEDURE — 96374 THER/PROPH/DIAG INJ IV PUSH: CPT

## 2019-05-25 PROCEDURE — 25000003 PHARM REV CODE 250: Performed by: EMERGENCY MEDICINE

## 2019-05-25 RX ORDER — SODIUM CHLORIDE 9 MG/ML
500 INJECTION, SOLUTION INTRAVENOUS
Status: COMPLETED | OUTPATIENT
Start: 2019-05-25 | End: 2019-05-25

## 2019-05-25 RX ORDER — MORPHINE SULFATE 10 MG/ML
10 INJECTION INTRAMUSCULAR; INTRAVENOUS; SUBCUTANEOUS
Status: COMPLETED | OUTPATIENT
Start: 2019-05-25 | End: 2019-05-25

## 2019-05-25 RX ORDER — CIPROFLOXACIN 500 MG/1
500 TABLET ORAL 2 TIMES DAILY
Qty: 20 TABLET | Refills: 0 | Status: SHIPPED | OUTPATIENT
Start: 2019-05-25 | End: 2019-06-04

## 2019-05-25 RX ORDER — ONDANSETRON 2 MG/ML
4 INJECTION INTRAMUSCULAR; INTRAVENOUS
Status: COMPLETED | OUTPATIENT
Start: 2019-05-25 | End: 2019-05-25

## 2019-05-25 RX ADMIN — MORPHINE SULFATE 10 MG: 10 INJECTION, SOLUTION INTRAMUSCULAR; INTRAVENOUS at 08:05

## 2019-05-25 RX ADMIN — SODIUM CHLORIDE 500 ML: 0.9 INJECTION, SOLUTION INTRAVENOUS at 08:05

## 2019-05-25 RX ADMIN — ONDANSETRON 4 MG: 2 INJECTION INTRAMUSCULAR; INTRAVENOUS at 08:05

## 2019-05-25 NOTE — ED NOTES
Replaced 18 F barker with 22 F barker, using sterile technique.  Irrigated with second liter of NS.  No clots returned in last 500 cc; color lightening.  Unable to aspirate any volume in excess of that instilled.  Connected to drainage bag to monitor output.  Patient tolerated well.

## 2019-05-25 NOTE — ED NOTES
AAOx4, skin warm/dry, respirations even/unlabored.  NAD.  Attempted to irrigate existing barker catheter; unable to secure returns.  Removed catheter and replaced with 18 F barker, using sterile technique.  Irrigated and returned equal volumes, 1 liter, no extra volume returned.  Continued pink color after 1 liter.  Connected to drainage bag for observation.

## 2019-05-26 NOTE — ED NOTES
Bladder scan results intermittent; worse case report was 240 cc residual.  Urine return now dark red again; total output to date is  350 cc in excess of that instilled.

## 2019-05-26 NOTE — ED NOTES
Patient voided 300 cc dark, red urine since 500 cc bolus, but not as dark as on arrival.  Discussed progress with Dr. So and patient verbalizes readiness for discharge.

## 2019-05-31 ENCOUNTER — TELEPHONE (OUTPATIENT)
Dept: UROLOGY | Facility: CLINIC | Age: 59
End: 2019-05-31

## 2019-05-31 DIAGNOSIS — R31.0 GROSS HEMATURIA: Primary | ICD-10-CM

## 2019-05-31 NOTE — TELEPHONE ENCOUNTER
----- Message from Nicole Dockery sent at 5/31/2019 10:18 AM CDT -----  Contact: Mo  Type: Needs Medical Advice    Who Called:  patient  Symptoms (please be specific):  Penis is swollen and believes due to catheter   How long has patient had these symptoms:  One day   Pharm[facility] and phone #:  [facility]  Best Call Back Number: [phone]  Additional Information: [facility]

## 2019-06-01 NOTE — PROGRESS NOTES
[facility] Urology Progress Note    Mo Escobar is a 59 y.o. male who presents for evaluation     Hx of BPH s/p TURP in 2014 but had persistent gross hematuria intermittently ever since, which worsened earlier this year as he is chronically anticoagulated for clotting disorder.   He had admission for GH with clot retention mid 2017 and PSA which had been historically elevated for his age.    Bridged with lovenox for cystoscopic evaluation of his GH and prostate biopsy on 6/13/17. Psa 9.6 (34% free) 6/2017.  92g prostate by ultrasound with cystoscopic evidence of outlet obstruction with significant regrowth of lateral lobes and very large significant abnormal hypervascular edematous median lobe with calcifications. CT urogram negative except for prostatomegaly. Biopsy pathology negative for malignancy, and only demonstrated BPH.      6/22/17 - TURP/TULVP of his large erythematous obstructing prostate. Resected portion demonstrated pathologic BPH (20g).  He did pass a fill-and-pull voiding trial on 6/27/17 and resumed chronic anticoagulation with Coumadin.  7/31/17 - voiding well w/ good full stream, which he didn't even have after his first TURP. Initial urgency postop had nearly resolved, NTF down to 1x, PVR 5cc.  9/7/17 - recurrent GH with clot retention after riding mower and a ride in a flat bottom boat - manually irrigated and CT negative after w/ no residual clot, barker removed 24h later  10/30/17 - no urinary complaints, DCed proscar 2 weeks prior. Denied all LUTS emptied well, PVR 31cc, one interim epidosde GH cleared with hydration  4/30/18: intermittent GH every few mos especially if active, o/w urinating great with good flow and stream. AUA SS: 5/1 (2: freq, intermittency; 1 weak stream). PVR 62cc. Advised compliance checking coumadin levels, restarted finasteride 2/2 interim sporadic GH  11/5/18: only took 30d finasteride. Still has occasional GH with clots every few months. No dysuria. When no blood, good  stream/flow. AUA SS: 9/3 (3 intermittency, 2 straining, 1: emptying, freq, urgency, weak stream)  PSA 5/16/18 at clinical path labs of 4.75 (on finasteride). PSA 10/24/18 at [facility] of 8.7. Had been on 200mg IM TRT but held with psa results. No blood in urine after dcing TRT. PVR 60cc  Confirm MDx analysis of prostate biopsy negative  1/6/19: PSA 5.8 (10.2% free), INR 2.8, T 128 (), free T 3.3 (7.2-24). Advised continuing to hold TRT     I last saw him in routine follow up on 5/9/19 noting: same thing - every few months will have grossly bloody urine with clots, last in March, and prev in Dec. No inciting factors he can identify.  Can be a few weeks to a month every void, then resolves. Last time it happened, had big clots he needed to pass/push out. His baseline LBP intensifies when he is passing clots, a lot of it is on the left hand side.  Otherwise doing perfect, no nocturia, feels like he empties. AUA SS: 3/2 mostly satisifed (1: intermittency, weak stream, straining). Has been off  Testosterone. Has not taken finasteride since initial fill  Udip negative including for blood, No dosing changes of coumadin in interim. INR has been stable, PVR by bladder scan is 63cc  Urine cytology sent (negative), restarted finasteride, and MRI prostate ordered.    He then walked into clinic on 5/20/19 in urinary retention.  He reported drinking beer on Saturday night, and having difficulty urinating throughout Sunday.  Until he woke up on Monday and could not void and presented to clinic.  Sixteen Fijian Cotter catheter was placed and he was found to be in 600 cc retention, though urine was clear yellow.  No gross hematuria.  He was given a 30 day supply of Hytrin 5 mg.  Before any further planning could be made, he did return today with gross hematuria and clot retention despite Cotter catheter.  As this was during the middle of clinic, as per nursing notes, nurse exchange 16 Fij[place] Cotter catheter for 20 Fijian Cotter  catheter and drained bloody urine with clots.  In the interim until I could evaluate the patient 300 cc of sterile water were placed into the bladder and allowed the bladder to drain.    He reports no changes or heavy activity in the last week.  His INR has been therapeutic and not supratherapeutic.  Denies fevers, chills, flank pain.  Did have prostate MRI last week, results pending      ROS: A comprehensive 10 system review was performed and is negative except as noted above in HPI    PHYSICAL EXAM:    General: Alert, cooperative, no distress, appears stated age   Head: Normocephalic, without obvious abnormality, atraumatic   Eyes: PERRL, conjunctiva/corneas clear   Lungs: Respirations unlabored   Heart: Warm and well perfused   Abdomen: soft NT ND  : 20fr barker draiining grossly blood urine  Extremities: Extremities normal, atraumatic, no cyanosis or edema   Skin: Skin color, texture, turgor normal, no rashes or lesions   Psych: Appropriate   Neurologic: Non-focal       Recent Results (from the past 336 hour(s))   POCT URINE DIPSTICK WITHOUT MICROSCOPE    Collection Time: 05/20/19  2:17 PM   Result Value Ref Range    Color, UA yellow/clear     Spec Grav UA 1.015     pH, UA 5.0     WBC, UA negative     Nitrite, UA negative     Protein trace     Glucose, UA negative     Ketones, UA negative     Urobilinogen, UA 0.2     Bilirubin negative     Blood, UA large    POCT Bladder Scan    Collection Time: 05/20/19  2:17 PM   Result Value Ref Range    POC Residual Urine Volume 595 (A) 0 - 100 mL   Urine culture    Collection Time: 05/20/19  4:43 PM   Result Value Ref Range    Urine Culture, Routine No growth        ASSESSMENT   1. Clot retention of urine         Plan    I did use 60 cc catheter tip syringe is and sterile water to manually irrigate the bladder via his placed 20 [place]vian Barker catheter, 1st by instilling 60 cc into the bladder, and with subsequent syringe is flushing and irrigating, removing a significant  clot burden.  Mild traction was placed near the bladder neck so that any dependent clots could be irrigated out.  I was able to remove his significant clot burden with approximately 1 L of manual irrigation after which the urine was draining clear.  Urine was placed to a leg bag.  Advised patient to hold anticoagulation for 48-72 hours and urge increased fluid intake to ensure urine remains clear.  Will have to discuss plans for further cystoscopic evaluation or management.    20 min spent in encounter, over half in counseling, and the remainder and irrigation of bladder as above.

## 2019-06-03 NOTE — TELEPHONE ENCOUNTER
On proscar 5 and terazosin 5mg  3T MRI 81 prostate pirad2 only with some hyperemia c/w minor prostatitis  Did have to ER after irrigating in clinic and has been on cipro since BID. Has resumed bloodthinners. Urine has been clear overall but a little bloodtinged last night  Advised complete course of cipro, stay on hytrin/finasteride, remove barker in AM as nurse visit and as long as voiding, let inflammation in bladder calm down without barker before cystoscopically reevaluating, [facility] at [facility]

## 2019-06-04 ENCOUNTER — CLINICAL SUPPORT (OUTPATIENT)
Dept: UROLOGY | Facility: CLINIC | Age: 59
End: 2019-06-04
Payer: MEDICARE

## 2019-06-04 DIAGNOSIS — N40.0 BENIGN PROSTATIC HYPERPLASIA WITHOUT LOWER URINARY TRACT SYMPTOMS: ICD-10-CM

## 2019-06-04 DIAGNOSIS — R31.0 GROSS HEMATURIA: Primary | ICD-10-CM

## 2019-06-04 NOTE — PROGRESS NOTES
Patient here today to have catheter removed.   Catheter bag contains 100ml brown urine.    Patient reports that when emptied bag this morning there was a clot blocking the drainage at the bottom of bag.   Discussed this with Dr. Mcconnell, he recommends to remove the catheter, push fluids.    8ml saline removed from balloon.   Catheter removed with no difficulty.   Patient instructed to drink 6-8 eight oz glasses on non-cafeinated beverages.  If unable to urinate by 2:30 this afternoon, come back to the clinic to have catheter replaced.

## 2019-06-05 ENCOUNTER — CLINICAL SUPPORT (OUTPATIENT)
Dept: UROLOGY | Facility: CLINIC | Age: 59
End: 2019-06-05
Payer: MEDICARE

## 2019-06-05 DIAGNOSIS — R33.9 URINARY RETENTION: Primary | ICD-10-CM

## 2019-06-05 LAB — POC RESIDUAL URINE VOLUME: 40 ML (ref 0–100)

## 2019-06-05 PROCEDURE — 99499 NO LOS: ICD-10-PCS | Mod: S$PBB,,, | Performed by: UROLOGY

## 2019-06-05 PROCEDURE — 99499 UNLISTED E&M SERVICE: CPT | Mod: S$PBB,,, | Performed by: UROLOGY

## 2019-06-05 PROCEDURE — 51798 US URINE CAPACITY MEASURE: CPT | Mod: PBBFAC,PN

## 2019-06-05 NOTE — PROGRESS NOTES
Patient here today as a walk-in with c/o not being able to pee.    Last void was last night.  He has noticed some small threads of blood, but no intact clots.    Urine has been clear/yellow.  He says he has urinated a small amount today, but not much of a stream.  He has drank a bottle of Gatorade today.    PVR indicates 40ml in bladder.   Patient reports he feels the urge to urinate while doing the scan.    He voided 25ml clear/yellow urine, then scan indicates 27ml in bladder.   Encouraged to increase fluids.  Will call to check on him in am.

## 2019-06-06 ENCOUNTER — TELEPHONE (OUTPATIENT)
Dept: UROLOGY | Facility: CLINIC | Age: 59
End: 2019-06-06

## 2019-06-06 NOTE — TELEPHONE ENCOUNTER
----- Message from Radha Alejo sent at 6/6/2019  1:03 PM CDT -----  Contact: patient  Type:  Patient Returning Call    Who Called:  patient  Who Left Message for Patient:  trung  Does the patient know what this is regarding?:  yes  Best Call Back Number:  [phone]  Additional Information:  Requesting a call back

## 2019-06-24 ENCOUNTER — TELEPHONE (OUTPATIENT)
Dept: HEMATOLOGY/ONCOLOGY | Facility: CLINIC | Age: 59
End: 2019-06-24

## 2019-07-09 ENCOUNTER — HOSPITAL ENCOUNTER (OUTPATIENT)
Facility: AMBULARY SURGERY CENTER | Age: 59
Discharge: HOME | End: 2019-07-09
Attending: UROLOGY | Admitting: UROLOGY
Payer: MEDICARE

## 2019-07-09 DIAGNOSIS — R31.0 GROSS HEMATURIA: ICD-10-CM

## 2019-07-09 PROCEDURE — 51703 INSERT BLADDER CATH COMPLEX: CPT | Mod: 22,,, | Performed by: UROLOGY

## 2019-07-09 PROCEDURE — 51703 PR INSERTION OF TEMPORARY INDWELLING BLADDER CATHETERIZATION, COMPLICA: ICD-10-PCS | Mod: 22,,, | Performed by: UROLOGY

## 2019-07-09 PROCEDURE — 51703 INSERT BLADDER CATH COMPLEX: CPT | Performed by: UROLOGY

## 2019-07-09 PROCEDURE — 51700 IRRIGATION OF BLADDER: CPT | Performed by: UROLOGY

## 2019-07-09 RX ORDER — WATER 1 ML/ML
IRRIGANT IRRIGATION
Status: DISCONTINUED | OUTPATIENT
Start: 2019-07-09 | End: 2019-07-09 | Stop reason: HOSPADM

## 2019-07-09 RX ORDER — LIDOCAINE HYDROCHLORIDE 20 MG/ML
JELLY TOPICAL ONCE
Status: ACTIVE | OUTPATIENT
Start: 2019-07-09

## 2019-07-09 RX ORDER — CEFDINIR 300 MG/1
300 CAPSULE ORAL 2 TIMES DAILY
Qty: 6 CAPSULE | Refills: 0 | Status: SHIPPED | OUTPATIENT
Start: 2019-07-09 | End: 2019-07-12

## 2019-07-09 RX ORDER — LIDOCAINE HYDROCHLORIDE 20 MG/ML
JELLY TOPICAL
Status: DISCONTINUED
Start: 2019-07-09 | End: 2019-07-09 | Stop reason: HOSPADM

## 2019-07-09 RX ORDER — LIDOCAINE HYDROCHLORIDE 20 MG/ML
JELLY TOPICAL
Status: DISCONTINUED | OUTPATIENT
Start: 2019-07-09 | End: 2019-07-09 | Stop reason: HOSPADM

## 2019-07-09 NOTE — H&P
[facility] Urology Progress Note     Mo Escobar is a 59 y.o. male who presents for evaluation      Hx of BPH s/p TURP in 2014 but had persistent gross hematuria intermittently ever since, which worsened earlier this year as he is chronically anticoagulated for clotting disorder.   He had admission for GH with clot retention mid 2017 and PSA which had been historically elevated for his age.    Bridged with lovenox for cystoscopic evaluation of his GH and prostate biopsy on 6/13/17. Psa 9.6 (34% free) 6/2017.  92g prostate by ultrasound with cystoscopic evidence of outlet obstruction with significant regrowth of lateral lobes and very large significant abnormal hypervascular edematous median lobe with calcifications. CT urogram negative except for prostatomegaly. Biopsy pathology negative for malignancy, and only demonstrated BPH.      6/22/17 - TURP/TULVP of his large erythematous obstructing prostate. Resected portion demonstrated pathologic BPH (20g).  He did pass a fill-and-pull voiding trial on 6/27/17 and resumed chronic anticoagulation with Coumadin.  7/31/17 - voiding well w/ good full stream, which he didn't even have after his first TURP. Initial urgency postop had nearly resolved, NTF down to 1x, PVR 5cc.  9/7/17 - recurrent GH with clot retention after riding mower and a ride in a flat bottom boat - manually irrigated and CT negative after w/ no residual clot, barker removed 24h later  10/30/17 - no urinary complaints, DCed proscar 2 weeks prior. Denied all LUTS emptied well, PVR 31cc, one interim epidosde GH cleared with hydration  4/30/18: intermittent GH every few mos especially if active, o/w urinating great with good flow and stream. AUA SS: 5/1 (2: freq, intermittency; 1 weak stream). PVR 62cc. Advised compliance checking coumadin levels, restarted finasteride 2/2 interim sporadic GH  11/5/18: only took 30d finasteride. Still has occasional GH with clots every few months. No dysuria. When no blood,  good stream/flow. AUA SS: 9/3 (3 intermittency, 2 straining, 1: emptying, freq, urgency, weak stream)  PSA 5/16/18 at clinical path labs of 4.75 (on finasteride). PSA 10/24/18 at [facility] of 8.7. Had been on 200mg IM TRT but held with psa results. No blood in urine after dcing TRT. PVR 60cc  Confirm MDx analysis of prostate biopsy negative  1/6/19: PSA 5.8 (10.2% free), INR 2.8, T 128 (), free T 3.3 (7.2-24). Advised continuing to hold TRT     I last saw him in routine follow up on 5/9/19 noting: same thing - every few months will have grossly bloody urine with clots, last in March, and prev in Dec. No inciting factors he can identify.  Can be a few weeks to a month every void, then resolves. Last time it happened, had big clots he needed to pass/push out. His baseline LBP intensifies when he is passing clots, a lot of it is on the left hand side.  Otherwise doing perfect, no nocturia, feels like he empties. AUA SS: 3/2 mostly satisifed (1: intermittency, weak stream, straining). Has been off  Testosterone. Has not taken finasteride since initial fill  Udip negative including for blood, No dosing changes of coumadin in interim. INR has been stable, PVR by bladder scan is 63cc  Urine cytology sent (negative), restarted finasteride, and MRI prostate ordered.     He then walked into clinic on 5/20/19 in urinary retention.  He reported drinking beer on Saturday night, and having difficulty urinating throughout Sunday.  Until he woke up on Monday and could not void and presented to clinic.  Sixteen [place] citizen Cotter catheter was placed and he was found to be in 600 cc retention, though urine was clear yellow.  No gross hematuria.  He was given a 30 day supply of Hytrin 5 mg.  Before any further planning could be made, he did return today with gross hematuria and clot retention despite Cotter catheter.  As this was during the middle of clinic, as per nursing notes, nurse exchange 16 [place] citizen Cotter catheter for 20 [place] citizen  Barker catheter and drained bloody urine with clots.  In the interim until I could evaluate the patient 300 cc of sterile water were placed into the bladder and allowed the bladder to drain.     He reports no changes or heavy activity in the last week.  His INR has been therapeutic and not supratherapeutic.  Denies fevers, chills, flank pain.  Did have prostate MRI last week, results pending        ROS: A comprehensive 10 system review was performed and is negative except as noted above in HPI     PHYSICAL EXAM:     General: Alert, cooperative, no distress, appears stated age   Head: Normocephalic, without obvious abnormality, atraumatic   Eyes: PERRL, conjunctiva/corneas clear   Lungs: Respirations unlabored   Heart: Warm and well perfused   Abdomen: soft NT ND  : 20fr barker draiining grossly blood urine  Extremities: Extremities normal, atraumatic, no cyanosis or edema   Skin: Skin color, texture, turgor normal, no rashes or lesions   Psych: Appropriate   Neurologic: Non-focal         Recent Results         Recent Results (from the past 336 hour(s))   POCT URINE DIPSTICK WITHOUT MICROSCOPE     Collection Time: 05/20/19  2:17 PM   Result Value Ref Range     Color, UA yellow/clear       Spec Grav UA 1.015       pH, UA 5.0       WBC, UA negative       Nitrite, UA negative       Protein trace       Glucose, UA negative       Ketones, UA negative       Urobilinogen, UA 0.2       Bilirubin negative       Blood, UA large     POCT Bladder Scan     Collection Time: 05/20/19  2:17 PM   Result Value Ref Range     POC Residual Urine Volume 595 (A) 0 - 100 mL   Urine culture     Collection Time: 05/20/19  4:43 PM   Result Value Ref Range     Urine Culture, Routine No growth              ASSESSMENT   1. Clot retention of urine            Plan    I did use 60 cc catheter tip syringe is and sterile water to manually irrigate the bladder via his placed 20 Wol[place] Barker catheter, 1st by instilling 60 cc into the bladder, and with  subsequent syringe is flushing and irrigating, removing a significant clot burden.  Mild traction was placed near the bladder neck so that any dependent clots could be irrigated out.  I was able to remove his significant clot burden with approximately 1 L of manual irrigation after which the urine was draining clear.  Urine was placed to a leg bag.  Advised patient to hold anticoagulation for 48-72 hours and urge increased fluid intake to ensure urine remains clear.  Will have to discuss plans for further cystoscopic evaluation or management.    Above 5/20  Returned 5/24 with GH with clots  On proscar 5 and terazosin 5mg  Did have to ER after irrigating in clinic and has been on cipro since BID. Has resumed bloodthinners. Urine has been clear overall but a little bloodtinged 5/30. Noted to be urinating well 6/6  Advised complete course of cipro, stay on hytrin/finasteride, remove barker in AM as nurse visit and as long as voiding, let inflammation in bladder calm down without barker before cystoscopically reevaluating, planned for 7/9 at [facility]  3T MRI 81 prostate pirad2 only with some hyperemia c/w minor prostatitis     Pt is acceptable candidate for procedure at [facility]

## 2019-07-09 NOTE — PLAN OF CARE
Denies pain; instructions reviewed with pt/wife; Dr. Mcconnell spoke with pt/wife; ambulatory with catheter.

## 2019-07-10 NOTE — BRIEF OP NOTE
[facility] Urology  Brief Operative/Discharge Note    Date: 07/09/2019    Staff Surgeon: Mike Mcconnell MD    Pre-Op Diagnosis: gross hematuria    Post-Op Diagnosis: gross hematuria with clot retention    Procedure(s) Performed:   Bladder irrigation via complex barker placement (59251), mod. 22    Specimen(s): none    Anesthesia: Local anesthesia topical 2% lidocaine gel per urethra    Findings: 4L of manual irrigation used to remove SIGNIFICANT clot burden (>90 mins spent)  - as had been urinating blood with clots for the last 3 days after clear urine for 1 month, exacerbated by flat bottom boat, and continued his bloodthinners.  - no cystoscopy performed    Estimated Blood Loss: 75-100cc of clot or more    Drains: 22fr barker    Complications: none    Disposition: home with barker, urine now clear - hold bloodthinners 48 hours, and increase water intake. Take ppx abx rxed. Return Monday 7/15 by 0900 for barker removal. Avoid riding mowers, flat bottom boats, ATVs, etc activities with perineal pressure. Will plan cystoscopic eval again a few weeks after barker removed to allow edema from aggressive irrigation and barker related edema to resolve for more accurate lower tract assessment of his recurrent GH    Discharge home today status post uncomplicated procedure as above  Diet - resume home diet  Follow up: 7/15 barker removal and will make new cysto plan  Instructions: as above in dispo, and if clots/barker obstruction return to clinic vs er  Meds:     Medication List      START taking these medications    cefdinir 300 MG capsule  Commonly known as:  OMNICEF  Take 1 capsule (300 mg total) by mouth 2 (two) times daily. for 3 days        CONTINUE taking these medications    DULoxetine 60 MG capsule  Commonly known as:  CYMBALTA     finasteride 5 mg tablet  Commonly known as:  PROSCAR  Take 1 tablet (5 mg total) by mouth once daily.     FOLBIC 2.5-25-2 mg Tab  Generic drug:  folic acid-vit B6-vit B12 2.5-25-2 mg  TAKE ONE  TABLET BY MOUTH ONCE DAILY     iron-vitamin C 100-250 mg (ICAR-C) 100-250 mg Tab  TAKE ONE TABLET BY MOUTH ONCE DAILY     SYNTHROID 112 MCG tablet  Generic drug:  levothyroxine     warfarin 5 MG tablet  Commonly known as:  COUMADIN  TAKE ONE TABLET BY MOUTH ONCE DAILY           Where to Get Your Medications      These medications were sent to [id number] - [place], [place] - [facility]  [address] [place] [id number]    Phone:  [phone]   · cefdinir 300 MG capsule

## 2019-07-11 VITALS
DIASTOLIC BLOOD PRESSURE: 87 MMHG | BODY MASS INDEX: 39.42 KG/M2 | WEIGHT: 291 LBS | HEIGHT: 72 IN | TEMPERATURE: 98 F | SYSTOLIC BLOOD PRESSURE: 146 MMHG | RESPIRATION RATE: 18 BRPM | OXYGEN SATURATION: 98 % | HEART RATE: 59 BPM

## 2019-07-14 NOTE — OP NOTE
[facility] Urology Operative Note     Date: 07/09/2019     Staff Surgeon: Mike Mcconnell MD     Pre-Op Diagnosis: gross hematuria     Post-Op Diagnosis: gross hematuria with clot retention     Procedure(s) Performed:   Bladder irrigation via complex barker placement (31687), modifier 22     Specimen(s): none     Anesthesia: Local anesthesia topical 2% lidocaine gel per urethra     Findings: 4L of manual irrigation used to remove SIGNIFICANT clot burden (>90 mins spent)  - as had been urinating blood with clots for the last 3 days after clear urine for 1 month, exacerbated by flat bottom boat, and continued his bloodthinners.  - no cystoscopy performed  - modifier 22 should be applied given the significant complexity due to large clot burden the need for Barker catheter change his catheter kept clotting off, as well as significant time spent beyond usual in encounter to manually irrigate bladder clear.     Estimated Blood Loss: 75-100cc of clot or more     Drains: 22fr barker     Complications: none    Indications for procedure:  60 yo M chronically anticoagulated for clotting disorder with history of significant BPH and gross hematuria of prostatic source.  Gross hematuria was intermittent ever since TURP in 2014, and on the stent was seen care with me at perform TURP and transurethral laser vaporization of prostate on 6/22/17 after negative prostate biopsy.  Was voiding well with longest.  Without hematuria which then started to recur intermittently and has been managed with finasteride, though more recently presented in urinary retention 5/20/19 of which urine that drained was clear, but later returned with clot retention per Barker catheter requiring manual irrigation x2 days, and ultimately Barker catheter was removed and he presents today for repeat cystoscopic evaluation.  He does however note that he has had blood in his urine with clots for the last 3 days and his urine is grossly bloody today.      Procedure in  detail:  After appropriate informed consent the patient was taken to the cystoscopy suite and placed in the supine position.  He was prepped and draped in standard sterile fashion.  Given the significant bloody urine he voided, prior to any planned cystoscopy, a 20 Mongolian Cotter catheter was placed and raghu bloody urine was drained.  Manual irrigation with sterile water was performed 1st by instilling 60 cc into the catheter, and flushing and irrigating with subsequent syringe fulls.  A significant burden of pure clot was aspirated in after about 1 L of manual irrigation this Cotter catheter clotted off necessitating change.  A 22 Mongolian Cotter catheter was placed at this time and manual irrigation continued, continuing to flush and irrigate removing large burden of clots, spending greater than 90 min irrigating clots out of his bladder using over 4 L of manual irrigation 60 cc at a time until the urine had cleared.    Given the significant manipulation and irrigation efforts as well as grossly bloody urine at the start, and no likely bladder wall erythema from manual irrigation, did not advise cystoscopy at this time, especially if there was bleeding of prostatic source which could be aggravated by continued passage and instrumentation.  We did discuss leaving this Cotter catheter indwelling and referring from high risk activity, holding blood thinners for 48 hr, then resuming blood thinners to observe urine with Cotter catheter after restarting blood thinners and presented for Cotter catheter removal next week.  He did go 1 month from last Cotter catheter removal before having recurrent gross hematuria, and we did discuss that after Cotter catheter is removed would want some time for Cotter catheter related edema to calm down before fully re-evaluating lower tract with cystoscopy to assess for reason for his recurrent gross hematuria.  Certainly at any sign of recurrent gross hematuria he should immediately homes blood  thinners for 24 hr and contact us before he becomes florid clot retention again in the interim.  After Barker catheter removal will discuss date for new cystoscopy.        Disposition: home with barker, urine now clear - hold bloodthinners 48 hours, and increase water intake. Take ppx abx rxed. Return Monday 7/15 by 0900 for barker removal. Avoid riding mowers, flat bottom boats, ATVs, etc activities with perineal pressure. Will plan cystoscopic eval again a few weeks after barker removed to allow edema from aggressive irrigation and barker related edema to resolve for more accurate lower tract assessment of his recurrent GH

## 2019-07-15 ENCOUNTER — CLINICAL SUPPORT (OUTPATIENT)
Dept: UROLOGY | Facility: CLINIC | Age: 59
End: 2019-07-15
Payer: MEDICARE

## 2019-07-15 DIAGNOSIS — R31.0 GROSS HEMATURIA: Primary | ICD-10-CM

## 2019-07-15 NOTE — PROGRESS NOTES
Patient here today to have his catheter removed post gross hematuria/clotting.      10ml saline removed from catheter balloon.   Catheter removed with no difficulty.   Leg bag contains 100ml of yellow/clear urine.   Patient instructed to drink 6-8 eight oz glasses on non-cafeinated beverages.  If unable to urinate by 2:30 this afternoon, come back to the clinic to have catheter replaced.

## 2019-07-26 ENCOUNTER — TELEPHONE (OUTPATIENT)
Dept: HEMATOLOGY/ONCOLOGY | Facility: CLINIC | Age: 59
End: 2019-07-26

## 2019-08-19 ENCOUNTER — TELEPHONE (OUTPATIENT)
Dept: UROLOGY | Facility: CLINIC | Age: 59
End: 2019-08-19

## 2019-08-19 NOTE — TELEPHONE ENCOUNTER
----- Message from Jamal Burnham sent at 8/19/2019  3:12 PM CDT -----  Type: Needs Medical Advice    Who Called:  Patient    Best Call Back Number: [phone]  Additional Information: Patient states that he would like a callback regarding rescheduling a test that had been canceled

## 2019-08-20 DIAGNOSIS — R31.0 GROSS HEMATURIA: Primary | ICD-10-CM

## 2019-08-20 RX ORDER — LIDOCAINE HYDROCHLORIDE 20 MG/ML
JELLY TOPICAL ONCE
Status: CANCELLED | OUTPATIENT
Start: 2019-08-20 | End: 2019-08-20

## 2019-08-27 ENCOUNTER — HOSPITAL ENCOUNTER (OUTPATIENT)
Facility: AMBULARY SURGERY CENTER | Age: 59
Discharge: HOME | End: 2019-08-27
Attending: UROLOGY | Admitting: UROLOGY
Payer: MEDICARE

## 2019-08-27 DIAGNOSIS — R31.0 GROSS HEMATURIA: ICD-10-CM

## 2019-08-27 PROCEDURE — 52000 CYSTOURETHROSCOPY: CPT | Mod: ,,, | Performed by: UROLOGY

## 2019-08-27 PROCEDURE — 52000 PR CYSTOURETHROSCOPY: ICD-10-PCS | Mod: ,,, | Performed by: UROLOGY

## 2019-08-27 PROCEDURE — 87086 URINE CULTURE/COLONY COUNT: CPT

## 2019-08-27 PROCEDURE — 52000 CYSTOURETHROSCOPY: CPT | Performed by: UROLOGY

## 2019-08-27 RX ORDER — WATER 1 ML/ML
IRRIGANT IRRIGATION
Status: DISCONTINUED | OUTPATIENT
Start: 2019-08-27 | End: 2019-08-27 | Stop reason: HOSPADM

## 2019-08-27 RX ORDER — CEFDINIR 300 MG/1
300 CAPSULE ORAL 2 TIMES DAILY
Qty: 6 CAPSULE | Refills: 0 | Status: SHIPPED | OUTPATIENT
Start: 2019-08-27 | End: 2019-08-30

## 2019-08-27 RX ORDER — LIDOCAINE HYDROCHLORIDE 20 MG/ML
JELLY TOPICAL
Status: DISCONTINUED | OUTPATIENT
Start: 2019-08-27 | End: 2019-08-27 | Stop reason: HOSPADM

## 2019-08-27 RX ORDER — LIDOCAINE HYDROCHLORIDE 20 MG/ML
JELLY TOPICAL ONCE
Status: DISCONTINUED | OUTPATIENT
Start: 2019-08-27 | End: 2019-08-27 | Stop reason: HOSPADM

## 2019-08-27 NOTE — H&P (VIEW-ONLY)
[facility] Urology Progress Note     Mo Escobar is a 59 y.o. male who presents for evaluation      Hx of BPH s/p TURP in 2014 but had persistent gross hematuria intermittently ever since, which worsened earlier this year as he is chronically anticoagulated for clotting disorder.   He had admission for GH with clot retention mid 2017 and PSA which had been historically elevated for his age.    Bridged with lovenox for cystoscopic evaluation of his GH and prostate biopsy on 6/13/17. Psa 9.6 (34% free) 6/2017.  92g prostate by ultrasound with cystoscopic evidence of outlet obstruction with significant regrowth of lateral lobes and very large significant abnormal hypervascular edematous median lobe with calcifications. CT urogram negative except for prostatomegaly. Biopsy pathology negative for malignancy, and only demonstrated BPH.      6/22/17 - TURP/TULVP of his large erythematous obstructing prostate. Resected portion demonstrated pathologic BPH (20g).  He did pass a fill-and-pull voiding trial on 6/27/17 and resumed chronic anticoagulation with Coumadin.  7/31/17 - voiding well w/ good full stream, which he didn't even have after his first TURP. Initial urgency postop had nearly resolved, NTF down to 1x, PVR 5cc.  9/7/17 - recurrent GH with clot retention after riding mower and a ride in a flat bottom boat - manually irrigated and CT negative after w/ no residual clot, barker removed 24h later  10/30/17 - no urinary complaints, DCed proscar 2 weeks prior. Denied all LUTS emptied well, PVR 31cc, one interim epidosde GH cleared with hydration  4/30/18: intermittent GH every few mos especially if active, o/w urinating great with good flow and stream. AUA SS: 5/1 (2: freq, intermittency; 1 weak stream). PVR 62cc. Advised compliance checking coumadin levels, restarted finasteride 2/2 interim sporadic GH  11/5/18: only took 30d finasteride. Still has occasional GH with clots every few months. No dysuria. When no blood,  good stream/flow. AUA SS: 9/3 (3 intermittency, 2 straining, 1: emptying, freq, urgency, weak stream)  PSA 5/16/18 at clinical path labs of 4.75 (on finasteride). PSA 10/24/18 at [facility] of 8.7. Had been on 200mg IM TRT but held with psa results. No blood in urine after dcing TRT. PVR 60cc  Confirm MDx analysis of prostate biopsy negative  1/6/19: PSA 5.8 (10.2% free), INR 2.8, T 128 (), free T 3.3 (7.2-24). Advised continuing to hold TRT     I last saw him in routine follow up on 5/9/19 noting: same thing - every few months will have grossly bloody urine with clots, last in March, and prev in Dec. No inciting factors he can identify.  Can be a few weeks to a month every void, then resolves. Last time it happened, had big clots he needed to pass/push out. His baseline LBP intensifies when he is passing clots, a lot of it is on the left hand side.  Otherwise doing perfect, no nocturia, feels like he empties. AUA SS: 3/2 mostly satisifed (1: intermittency, weak stream, straining). Has been off  Testosterone. Has not taken finasteride since initial fill  Udip negative including for blood, No dosing changes of coumadin in interim. INR has been stable, PVR by bladder scan is 63cc  Urine cytology sent (negative), restarted finasteride, and MRI prostate ordered.     He then walked into clinic on 5/20/19 in urinary retention.  He reported drinking beer on Saturday night, and having difficulty urinating throughout Sunday.  Until he woke up on Monday and could not void and presented to clinic.  Sixteen Emir[place] Cotter catheter was placed and he was found to be in 600 cc retention, though urine was clear yellow.  No gross hematuria.  He was given a 30 day supply of Hytrin 5 mg.  Before any further planning could be made, he did return today with gross hematuria and clot retention despite Cotter catheter.  As this was during the middle of clinic, as per nursing notes, nurse exchange 16 Emir[place] Cotter catheter for 20 Emirati  Barker catheter and drained bloody urine with clots.  In the interim until I could evaluate the patient 300 cc of sterile water were placed into the bladder and allowed the bladder to drain.     He reports no changes or heavy activity in the last week.  His INR has been therapeutic and not supratherapeutic.  Denies fevers, chills, flank pain.  Did have prostate MRI last week, results pending        ROS: A comprehensive 10 system review was performed and is negative except as noted above in HPI     PHYSICAL EXAM:     General: Alert, cooperative, no distress, appears stated age   Head: Normocephalic, without obvious abnormality, atraumatic   Eyes: PERRL, conjunctiva/corneas clear   Lungs: Respirations unlabored   Heart: Warm and well perfused   Abdomen: soft NT ND  : 20fr barker draiining grossly blood urine  Extremities: Extremities normal, atraumatic, no cyanosis or edema   Skin: Skin color, texture, turgor normal, no rashes or lesions   Psych: Appropriate   Neurologic: Non-focal         Recent Results             Recent Results (from the past 336 hour(s))   POCT URINE DIPSTICK WITHOUT MICROSCOPE     Collection Time: 05/20/19  2:17 PM   Result Value Ref Range     Color, UA yellow/clear       Spec Grav UA 1.015       pH, UA 5.0       WBC, UA negative       Nitrite, UA negative       Protein trace       Glucose, UA negative       Ketones, UA negative       Urobilinogen, UA 0.2       Bilirubin negative       Blood, UA large     POCT Bladder Scan     Collection Time: 05/20/19  2:17 PM   Result Value Ref Range     POC Residual Urine Volume 595 (A) 0 - 100 mL   Urine culture     Collection Time: 05/20/19  4:43 PM   Result Value Ref Range     Urine Culture, Routine No growth              ASSESSMENT   1. Clot retention of urine            Plan    I did use 60 cc catheter tip syringe is and sterile water to manually irrigate the bladder via his placed 20 Tunisian Barker catheter, 1st by instilling 60 cc into the bladder, and  with subsequent syringe is flushing and irrigating, removing a significant clot burden.  Mild traction was placed near the bladder neck so that any dependent clots could be irrigated out.  I was able to remove his significant clot burden with approximately 1 L of manual irrigation after which the urine was draining clear.  Urine was placed to a leg bag.  Advised patient to hold anticoagulation for 48-72 hours and urge increased fluid intake to ensure urine remains clear.  Will have to discuss plans for further cystoscopic evaluation or management.     Above 5/20  Returned 5/24 with GH with clots  On proscar 5 and terazosin 5mg  Did have to ER after irrigating in clinic and has been on cipro since BID. Has resumed bloodthinners. Urine has been clear overall but a little bloodtinged 5/30. Noted to be urinating well 6/6  Advised complete course of cipro, stay on hytrin/finasteride, remove barker in AM as nurse visit and as long as voiding, let inflammation in bladder calm down without barker before cystoscopically reevaluating, planned for 7/9 at [facility]  3T MRI 81 prostate pirad2 only with some hyperemia c/w minor prostatitis      Presented 7/9/19 with sig GH with clots in clot retention  Findings: 4L of manual irrigation used to remove SIGNIFICANT clot burden (>90 mins spent)  - as had been urinating blood with clots for the last 3 days after clear urine for 1 month, exacerbated by flat bottom boat, and continued his bloodthinners.  - no cystoscopy performed    home with barker, urine now clear - hold bloodthinners 48 hours, and increase water intake. Take ppx abx rxed. Return Monday 7/15 by 0900 for barker removal. Avoid riding mowers, flat bottom boats, ATVs, etc activities with perineal pressure. Will plan cystoscopic eval again a few weeks after barker removed to allow edema from aggressive irrigation and barekr related edema to resolve for more accurate lower tract assessment of his recurrent GH    8/19 noted intermittent  GH  Last blood in urine 2d ago, held coumadin, urine clear today      Pt is acceptable candidate for procedure at [facility]

## 2019-08-27 NOTE — H&P
[facility] Urology Progress Note     Mo Escobar is a 59 y.o. male who presents for evaluation      Hx of BPH s/p TURP in 2014 but had persistent gross hematuria intermittently ever since, which worsened earlier this year as he is chronically anticoagulated for clotting disorder.   He had admission for GH with clot retention mid 2017 and PSA which had been historically elevated for his age.    Bridged with lovenox for cystoscopic evaluation of his GH and prostate biopsy on 6/13/17. Psa 9.6 (34% free) 6/2017.  92g prostate by ultrasound with cystoscopic evidence of outlet obstruction with significant regrowth of lateral lobes and very large significant abnormal hypervascular edematous median lobe with calcifications. CT urogram negative except for prostatomegaly. Biopsy pathology negative for malignancy, and only demonstrated BPH.      6/22/17 - TURP/TULVP of his large erythematous obstructing prostate. Resected portion demonstrated pathologic BPH (20g).  He did pass a fill-and-pull voiding trial on 6/27/17 and resumed chronic anticoagulation with Coumadin.  7/31/17 - voiding well w/ good full stream, which he didn't even have after his first TURP. Initial urgency postop had nearly resolved, NTF down to 1x, PVR 5cc.  9/7/17 - recurrent GH with clot retention after riding mower and a ride in a flat bottom boat - manually irrigated and CT negative after w/ no residual clot, barker removed 24h later  10/30/17 - no urinary complaints, DCed proscar 2 weeks prior. Denied all LUTS emptied well, PVR 31cc, one interim epidosde GH cleared with hydration  4/30/18: intermittent GH every few mos especially if active, o/w urinating great with good flow and stream. AUA SS: 5/1 (2: freq, intermittency; 1 weak stream). PVR 62cc. Advised compliance checking coumadin levels, restarted finasteride 2/2 interim sporadic GH  11/5/18: only took 30d finasteride. Still has occasional GH with clots every few months. No dysuria. When no blood,  good stream/flow. AUA SS: 9/3 (3 intermittency, 2 straining, 1: emptying, freq, urgency, weak stream)  PSA 5/16/18 at clinical path labs of 4.75 (on finasteride). PSA 10/24/18 at [facility] of 8.7. Had been on 200mg IM TRT but held with psa results. No blood in urine after dcing TRT. PVR 60cc  Confirm MDx analysis of prostate biopsy negative  1/6/19: PSA 5.8 (10.2% free), INR 2.8, T 128 (), free T 3.3 (7.2-24). Advised continuing to hold TRT     I last saw him in routine follow up on 5/9/19 noting: same thing - every few months will have grossly bloody urine with clots, last in March, and prev in Dec. No inciting factors he can identify.  Can be a few weeks to a month every void, then resolves. Last time it happened, had big clots he needed to pass/push out. His baseline LBP intensifies when he is passing clots, a lot of it is on the left hand side.  Otherwise doing perfect, no nocturia, feels like he empties. AUA SS: 3/2 mostly satisifed (1: intermittency, weak stream, straining). Has been off  Testosterone. Has not taken finasteride since initial fill  Udip negative including for blood, No dosing changes of coumadin in interim. INR has been stable, PVR by bladder scan is 63cc  Urine cytology sent (negative), restarted finasteride, and MRI prostate ordered.     He then walked into clinic on 5/20/19 in urinary retention.  He reported drinking beer on Saturday night, and having difficulty urinating throughout Sunday.  Until he woke up on Monday and could not void and presented to clinic.  Sixteen Surinamese Cotter catheter was placed and he was found to be in 600 cc retention, though urine was clear yellow.  No gross hematuria.  He was given a 30 day supply of Hytrin 5 mg.  Before any further planning could be made, he did return today with gross hematuria and clot retention despite Cotter catheter.  As this was during the middle of clinic, as per nursing notes, nurse exchange 16 Surinamese Cotter catheter for 20 Surinamese  Barker catheter and drained bloody urine with clots.  In the interim until I could evaluate the patient 300 cc of sterile water were placed into the bladder and allowed the bladder to drain.     He reports no changes or heavy activity in the last week.  His INR has been therapeutic and not supratherapeutic.  Denies fevers, chills, flank pain.  Did have prostate MRI last week, results pending        ROS: A comprehensive 10 system review was performed and is negative except as noted above in HPI     PHYSICAL EXAM:     General: Alert, cooperative, no distress, appears stated age   Head: Normocephalic, without obvious abnormality, atraumatic   Eyes: PERRL, conjunctiva/corneas clear   Lungs: Respirations unlabored   Heart: Warm and well perfused   Abdomen: soft NT ND  : 20fr barker draiining grossly blood urine  Extremities: Extremities normal, atraumatic, no cyanosis or edema   Skin: Skin color, texture, turgor normal, no rashes or lesions   Psych: Appropriate   Neurologic: Non-focal         Recent Results             Recent Results (from the past 336 hour(s))   POCT URINE DIPSTICK WITHOUT MICROSCOPE     Collection Time: 05/20/19  2:17 PM   Result Value Ref Range     Color, UA yellow/clear       Spec Grav UA 1.015       pH, UA 5.0       WBC, UA negative       Nitrite, UA negative       Protein trace       Glucose, UA negative       Ketones, UA negative       Urobilinogen, UA 0.2       Bilirubin negative       Blood, UA large     POCT Bladder Scan     Collection Time: 05/20/19  2:17 PM   Result Value Ref Range     POC Residual Urine Volume 595 (A) 0 - 100 mL   Urine culture     Collection Time: 05/20/19  4:43 PM   Result Value Ref Range     Urine Culture, Routine No growth              ASSESSMENT   1. Clot retention of urine            Plan    I did use 60 cc catheter tip syringe is and sterile water to manually irrigate the bladder via his placed 20 Bhutanese Barker catheter, 1st by instilling 60 cc into the bladder, and  with subsequent syringe is flushing and irrigating, removing a significant clot burden.  Mild traction was placed near the bladder neck so that any dependent clots could be irrigated out.  I was able to remove his significant clot burden with approximately 1 L of manual irrigation after which the urine was draining clear.  Urine was placed to a leg bag.  Advised patient to hold anticoagulation for 48-72 hours and urge increased fluid intake to ensure urine remains clear.  Will have to discuss plans for further cystoscopic evaluation or management.     Above 5/20  Returned 5/24 with GH with clots  On proscar 5 and terazosin 5mg  Did have to ER after irrigating in clinic and has been on cipro since BID. Has resumed bloodthinners. Urine has been clear overall but a little bloodtinged 5/30. Noted to be urinating well 6/6  Advised complete course of cipro, stay on hytrin/finasteride, remove barker in AM as nurse visit and as long as voiding, let inflammation in bladder calm down without barker before cystoscopically reevaluating, planned for 7/9 at [facility]  3T MRI 81 prostate pirad2 only with some hyperemia c/w minor prostatitis      Presented 7/9/19 with sig GH with clots in clot retention  Findings: 4L of manual irrigation used to remove SIGNIFICANT clot burden (>90 mins spent)  - as had been urinating blood with clots for the last 3 days after clear urine for 1 month, exacerbated by flat bottom boat, and continued his bloodthinners.  - no cystoscopy performed    home with barker, urine now clear - hold bloodthinners 48 hours, and increase water intake. Take ppx abx rxed. Return Monday 7/15 by 0900 for barker removal. Avoid riding mowers, flat bottom boats, ATVs, etc activities with perineal pressure. Will plan cystoscopic eval again a few weeks after barker removed to allow edema from aggressive irrigation and barker related edema to resolve for more accurate lower tract assessment of his recurrent GH    8/19 noted intermittent  GH  Last blood in urine 2d ago, held coumadin, urine clear today      Pt is acceptable candidate for procedure at [facility]

## 2019-08-27 NOTE — DISCHARGE INSTRUCTIONS
Before leaving, please make sure you have all your personal belongings such as glasses, purses, wallets, keys, cell phones, jewelry, jackets etc      After the procedure    · Drink plenty of fluids.  · You may have burning or light bleeding when you urinate--this is normal.  · Medications may be prescribed to ease any discomfort or prevent infection. Take these as directed.  · Call your doctor if you have heavy bleeding or blood clots, burning that lasts more than a day, a fever over 100°F  (38° C), or trouble urinating.

## 2019-08-28 VITALS
HEART RATE: 68 BPM | DIASTOLIC BLOOD PRESSURE: 90 MMHG | BODY MASS INDEX: 39.42 KG/M2 | TEMPERATURE: 97 F | RESPIRATION RATE: 18 BRPM | HEIGHT: 72 IN | OXYGEN SATURATION: 96 % | SYSTOLIC BLOOD PRESSURE: 153 MMHG | WEIGHT: 291 LBS

## 2019-08-29 LAB — BACTERIA UR CULT: NORMAL

## 2019-08-29 NOTE — OP NOTE
[facility] Urology Operative/Brief Discharge Note    Date: 08/27/2019    Staff Surgeon: Mike Mcconnell MD    Pre-Op Diagnosis:   Gross hematuria  BPH    Post-Op Diagnosis: same    Procedure(s) Performed:   Cytoscopy, flexible    Specimen(s): none    Anesthesia: Local anesthesia topical 2% lidocaine gel    Findings: significant prostatic regrowth with asymmetric ingrowth/regrowth of bilateral lateral lobes and significant ballvalving obstructing intravesical median lobe, seen similar to pre-TURP presentation. Prostatic varices and hypervascularity seen along median lobe and within proximal prostatic urethra. Left ureteral orifice clearly seen. Right obscured by median lobe.    Estimated Blood Loss: none    Drains: none    Complications: none    Indications for procedure:  Mr Young is a 60 yo M chronically anticoagulated for clotting disorder with history of significant BPH and gross hematuria of prostatic source.  Gross hematuria was intermittent ever since TURP in 2014 by outside MD. Saw him during hospital consultation for gross hematuria and retention in June 2017 after which I performed TURP/laser vaporization on 6/22/17 after negative prostate biopsy (with negative confirm MDx) for 92g gland. 20g resected, including median lobe, and remainder laser vaporized.  Was voiding well for a long time without hematuria which then started to recur intermittently and has been managed with finasteride, though more recently presented in urinary retention 5/20/19 of which urine that drained was clear, but later returned with clot retention per Barker catheter requiring manual irrigation x2 days, and ultimately Barker catheter was removed. Had recurrence of GH and clot retention on planned cystoscope day 7/9/19 and instead had significant manual irrigation to resolve clot retention. Since barker has been out, has been urinating ok but has had intermittent episodes of GH for which he has held coumadin a few days and increased  hydration. Most recently 3 days ago. Historically episodes of GH had been after activity such as riding mower, boating, etc. Spontaneously recently, though last episode this past weekend was with sons digging ditches. When GH started recurring in May 2019, ucx and cytology negative. Here today for cystoscopic eval. He did also have 3T MRI this year finding prostate volume return to approx 85g.    Procedure in detail:  After informed consent, the patient was first placed in upine position and prepped and drapped in standard cystoscopic fashion and 2% xylocaine jelly was instilled into the urethra after removing his indwelling barker. A flexible cystoscope was passed into the bladder via the urethra.      Anterior urethra appeared normal without any lesions or narrowings. The prostatic urethra demonstrated significant though asymmetric regrowth of bilateral lateral lobes with kissing lateral lobes and evidence of outlet obstruction. Long prostatic urethra 5-6cm. Bladderder systematically inspected and no mucosal lesions or tumors. Bladder otherwise normal with grade 1 trabeculations.       He did have significant median lobe projection at bladder neck, and could flex down to trigone to clearly visualize left ureteral orifice with clear efflux just beyond border of median lobe. Right not visualized well with inferior deflection.     On retroflexion, could also see significant obstructing prostate tissue median lobe intravesical extension nearly surrounding scope obstructing outlet, similar to initial appearance prior to last TURP. Median lobe appeared very edematous, hypervascular, erythematous.  Right UO not clearly seen beyond median lobe obstruction    Patient tolerated procedure well.    Disposition:   Despite 2 previous transurethral resections/laser vaporization, he has demonstrated significant prostate regrowth.  He does have a very hypervascular prostate, and prostatic source bleeding in the setting of chronic  anticoagulation.  He was cleared for a preop Lovenox bridge into hold Coumadin for his last TURP which he did well from, and his postop follow-up had the longest interval free of gross hematuria and the best urinary symptoms he has had.  In the absence of his gross hematuria and recent clot retention episodes his voiding symptoms have still not completely recurred, though he continues to have gross hematuria episodes which appeared to be prostatic absorbable given the prostatic varices seen and hypervascularity over significant median lobe projection which he is voiding through.    We discussed options to proceed with more definitive management of his recurrent prostate obstruction with hypervascularity and prostatic source bleeding.  We did discuss repeat trans urethral vaporization verses resection, either with bipolar or laser.  He has recurred after this procedure twice in the past.  We did discuss robotic simple/suprapubic prostatectomy as a more definitive means to unobstruct the prostate.  Certainly, given the hypervascularity of his prostate and his baseline anticoagulation he has a higher risk of bleeding with this procedure, though done in minimally invasive fashion with the ability to hold blood thinners perioperatively, should do well, and he has done well without recurrent gross hematuria or LUTS after previous outlet procedures, though prostate growth has been significant, even when on finasteride.    We did also discuss that in the postoperative period of healing from TURP or laser vaporization, the intermittent expected gross hematuria can be exacerbated by his baseline blood thinners, and more definitive surgical management and suture lines may prevent some of these issues as well as further risk of stricture and he/cautery effect, as well as the ability to more directly visualize the trigone and ureteral orifices with magnified endoscope, as they are at high risk with transurethral procedure given  the multiple previous resections in proximity to median lobe.    Certainly, a repeat transurethral procedure is reasonable.  After either procedure in may be beneficial for the patient to remain on finasteride.  I did have eyes him that I would like him to see 1 of my colleagues in consultation to discuss robotic suprapubic prostatectomy.      Discharge home today status post uncomplicated procedure as above  Diet - resume home diet  Follow up: [facility] to discuss Robotic SPP  Instructions: drink plenty of water, hold coumadin 24 hours before restarting, complete anb  Meds:     Medication List      START taking these medications    cefdinir 300 MG capsule  Commonly known as:  OMNICEF  Take 1 capsule (300 mg total) by mouth 2 (two) times daily. for 3 days        CONTINUE taking these medications    DULoxetine 60 MG capsule  Commonly known as:  CYMBALTA     SYNTHROID 112 MCG tablet  Generic drug:  levothyroxine     warfarin 5 MG tablet  Commonly known as:  COUMADIN  TAKE ONE TABLET BY MOUTH ONCE DAILY           Where to Get Your Medications      These medications were sent to [id number] - [address] [address]    Phone:  [phone]   · cefdinir 300 MG capsule

## 2019-09-02 NOTE — PROGRESS NOTES
[facility] Hematology/Oncology  PROGRESS NOTE      Subjective:       Patient ID:   NAME: Mo Escobar : 1960     59 y.o. male    Referring Doc:  Jose A/Eleonora Renteria  Other Physicians: Travis Best Pinsky    Chief Complaint:  Clot disorder    History of Present Illness:     Patient returns today for a regularly scheduled follow-up visit.  The patient is doing ok with no new issues. He has been under the care of Dr Mcconnell with  and plans to see a Dr Eduardo in [place] in the near future.. No excessive bleeding or bruising. He is still on coumadin per our direction but he has been getting the INR done with his daughter's office .  No CP,SOB, HA's or N/V. He is here by himself.           ROS:   GEN: normal without any fever, night sweats or weight loss  HEENT: normal with no HA's, sore throat, stiff neck, changes in vision  CV: normal with no CP, SOB, PND, HERRERA or orthopnea  PULM: normal with no SOB, cough, hemoptysis, sputum or pleuritic pain  GI: normal with no abdominal pain, nausea, vomiting, constipation, diarrhea, melanotic stools, BRBPR, or hematemesis  : normal with no hematuria, dysuria  BREAST: normal with no mass, discharge, pain  SKIN: normal with no rash, erythema, bruising, or swelling    Allergies:  Review of patient's allergies indicates:  No Known Allergies    Medications:    Current Outpatient Medications:     dextroamphetamine-amphetamine 30 mg Tab, Take 1 tablet by mouth 2 (two) times daily., Disp: , Rfl: 0    DULoxetine (CYMBALTA) 30 MG capsule, Take 30 mg by mouth once daily., Disp: , Rfl: 3    levothyroxine (SYNTHROID) 112 MCG tablet, Take 112 mcg by mouth once daily., Disp: , Rfl:     warfarin (COUMADIN) 5 MG tablet, TAKE ONE TABLET BY MOUTH ONCE DAILY, Disp: 30 tablet, Rfl: 0  No current facility-administered medications for this visit.     Facility-Administered Medications Ordered in Other Visits:     lidocaine HCl 2% urojet, , Mucous Membrane, Once, Mike Mcconnell,  MD    PMHx/PSHx Updates:  See patient's last visit with me on  1/28/2019  See H&P on 8/21/2014        Pathology:  Cancer Staging  No matching staging information was found for the patient.          Objective:     Vitals:  Blood pressure 135/74, pulse (!) 54, temperature 98.4 °F (36.9 °C), resp. rate 18, weight 135.8 kg (299 lb 4.8 oz).    Physical Examination:   GEN: no apparent distress, comfortable; AAOx3  HEAD: atraumatic and normocephalic  EYES: no pallor, no icterus, PERRLA  ENT: OMM, no pharyngeal erythema, external ears WNL; no nasal discharge; no thrush  NECK: no masses, thyroid normal, trachea midline, no LAD/LN's, supple  CV: RRR with no murmur; normal pulse; normal S1 and S2; no pedal edema  CHEST: Normal respiratory effort; CTAB; normal breath sounds; no wheeze or crackles  ABDOM: nontender and nondistended; soft; normal bowel sounds; no rebound/guarding  MUSC/Skeletal: ROM normal; no crepitus; joints normal; no deformities or arthropathy  EXTREM: no clubbing, cyanosis, inflammation or swelling  SKIN: no rashes, lesions, ulcers, petechiae or subcutaneous nodules  : no barker  NEURO: grossly intact; motor/sensory WNL; AAOx3; no tremors  PSYCH: normal mood, affect and behavior  LYMPH: normal cervical, supraclavicular, axillary and groin LN's            Labs:     6/25/2019  On chart  INR was 2.8      Radiology/Diagnostic Studies:    Ct Abdomen Pelvis  Without Contrast    Result Date: 9/7/2017  CT Abdomen and Pelvis without contrast Comparison: 06/09/2017 Technique:  Helically acquired axial images of the abdomen and pelvis were acquired without contrast.   Reformatted coronal and sagittal images provided. FINDINGS: The liver, gallbladder, pancreas, spleen, and adrenal glands are unremarkable. There is no evidence for nephrolithiasis or hydroureteronephrosis.  A Barker catheter is present within the urinary bladder, which appears thickwalled and with hazy surrounding fat stranding.  No visible thrombus is  seen within the bladder.  The prostate gland appears enlarged.  A prominent 9 mm lymph node is present to the right atrium the bladder. A 3 mm nodule is present within the right lower lobe.  Moderate degenerative changes present at L5-S1.    1.  Decompressed ureter bladder with Cotter catheter in place without evidence for intraluminal blood clot.  The bladder wall appears thickened and with hazy stranding fat stranding suggesting cystitis. 2.  3 mm right lower lobe nodule.  In the absence of risk factors, no followup indicated.  Otherwise optional chest CT at 12 months may be performed. Electronically signed by: Liang Ceron MD Date:     09/07/17 Time:    16:19       I have reviewed all available lab results and radiology reports.    Assessment/Plan:   (1) 57 y.o. male with diagnosis of chronic clot disorder with prior bilateral pulmonary emboli  - he has underlying clot disorder with MTHFR-A and MTHFR-C heterozygous condition  - he has been on coumadin long term per direction of Dr Best with cardiology  - INR's on 6/25/2019 was 2.8     (2) Recurrent hematuria issues - he was previously hospitalized at [facility] and was previously under the care of Dr Sanchez and is now under the care of Dr Mcconnell with   - prior cyctoscope and TURP with Dr Mcconnell; everything looked good per patient  - minor hematuria residual on occasion  - planned to see Dr Eduardo in [place] about his prostate in the near future     (3) Recurrent clot event with DVT in the right popliteal vein  - he has been on coumadin per direction of Dr Best     (4) Pulmonary HTN hx - followed by Dr Robles with Pulmonary     (5) Hx/of gout issues    (6) Prior microcytic anemia - resolved - hgb at 13.0 and ferritin now normal at 43  - he is on oral iron; iron was WNL at 53        1. Acute deep vein thrombosis (DVT) of popliteal vein of right lower extremity     2. Chronic saddle pulmonary embolism without acute cor pulmonale     3. Anemia due to chronic  blood loss     4. Microcytic anemia     5. Heterozygous MTHFR mutation [id number]     6. Heterozygous MTHFR mutation [id number]         PLAN:  1. Check labs every 3 months  2. Check iron panel every 3 months; INR q 4 weeks - encouraged compliance and if he continues to have it done via his daughter's place of employement he needs to make sure we get the results  3. Continue ICAR-C  4. If he ever needs a surgery in the future, he would need a lovenox bridge while off coumadin  4. F/u with PCP,  and Card  5. Will continue coumadin management per patient's request    RTC in 6 months  Fax note to [place] Pinsky, [place], [place], [place], [place]    Discussion:     I have explained all of the above in detail and the patient understands all of the current recommendation(s). I have answered all of their questions to the best of my ability and to their complete satisfaction.   The patient is to continue with the current management plan.            Electronically signed by Dion Garrido MD

## 2019-09-03 ENCOUNTER — OFFICE VISIT (OUTPATIENT)
Dept: HEMATOLOGY/ONCOLOGY | Facility: CLINIC | Age: 59
End: 2019-09-03
Payer: MEDICARE

## 2019-09-03 ENCOUNTER — TELEPHONE (OUTPATIENT)
Dept: UROLOGY | Facility: CLINIC | Age: 59
End: 2019-09-03

## 2019-09-03 VITALS
HEART RATE: 54 BPM | RESPIRATION RATE: 18 BRPM | TEMPERATURE: 98 F | DIASTOLIC BLOOD PRESSURE: 74 MMHG | SYSTOLIC BLOOD PRESSURE: 135 MMHG | BODY MASS INDEX: 40.59 KG/M2 | WEIGHT: 299.31 LBS

## 2019-09-03 DIAGNOSIS — Z15.89 HETEROZYGOUS MTHFR MUTATION C677T: ICD-10-CM

## 2019-09-03 DIAGNOSIS — I27.82 CHRONIC SADDLE PULMONARY EMBOLISM WITHOUT ACUTE COR PULMONALE: ICD-10-CM

## 2019-09-03 DIAGNOSIS — I26.92 CHRONIC SADDLE PULMONARY EMBOLISM WITHOUT ACUTE COR PULMONALE: ICD-10-CM

## 2019-09-03 DIAGNOSIS — D50.9 MICROCYTIC ANEMIA: ICD-10-CM

## 2019-09-03 DIAGNOSIS — D50.0 ANEMIA DUE TO CHRONIC BLOOD LOSS: ICD-10-CM

## 2019-09-03 DIAGNOSIS — I82.431 ACUTE DEEP VEIN THROMBOSIS (DVT) OF POPLITEAL VEIN OF RIGHT LOWER EXTREMITY: Primary | ICD-10-CM

## 2019-09-03 DIAGNOSIS — Z15.89 HETEROZYGOUS MTHFR MUTATION A1298C: ICD-10-CM

## 2019-09-03 PROCEDURE — 99213 PR OFFICE/OUTPT VISIT, EST, LEVL III, 20-29 MIN: ICD-10-PCS | Mod: S$GLB,,, | Performed by: INTERNAL MEDICINE

## 2019-09-03 PROCEDURE — 99213 OFFICE O/P EST LOW 20 MIN: CPT | Mod: S$GLB,,, | Performed by: INTERNAL MEDICINE

## 2019-09-03 RX ORDER — DEXTROAMPHETAMINE SACCHARATE, AMPHETAMINE ASPARTATE, DEXTROAMPHETAMINE SULFATE AND AMPHETAMINE SULFATE 7.5; 7.5; 7.5; 7.5 MG/1; MG/1; MG/1; MG/1
1 TABLET ORAL 2 TIMES DAILY
Refills: 0 | Status: ON HOLD | COMMUNITY
Start: 2019-07-01 | End: 2019-10-14

## 2019-09-03 RX ORDER — WARFARIN SODIUM 5 MG/1
TABLET ORAL
Qty: 30 TABLET | Refills: 6 | Status: ON HOLD | OUTPATIENT
Start: 2019-09-03 | End: 2019-10-18 | Stop reason: HOSPADM

## 2019-09-03 RX ORDER — DULOXETIN HYDROCHLORIDE 30 MG/1
30 CAPSULE, DELAYED RELEASE ORAL DAILY
Refills: 3 | COMMUNITY
Start: 2019-08-30

## 2019-09-03 NOTE — TELEPHONE ENCOUNTER
Attemtped to contact left message to return call to schedule consult appointment.  paulina henderson lpn

## 2019-09-04 ENCOUNTER — OFFICE VISIT (OUTPATIENT)
Dept: UROLOGY | Facility: CLINIC | Age: 59
End: 2019-09-04
Payer: MEDICARE

## 2019-09-04 VITALS
SYSTOLIC BLOOD PRESSURE: 154 MMHG | DIASTOLIC BLOOD PRESSURE: 73 MMHG | BODY MASS INDEX: 40.66 KG/M2 | HEART RATE: 63 BPM | WEIGHT: 299.81 LBS

## 2019-09-04 DIAGNOSIS — Z90.79 S/P TURP: ICD-10-CM

## 2019-09-04 DIAGNOSIS — N40.0 BENIGN PROSTATIC HYPERPLASIA WITHOUT LOWER URINARY TRACT SYMPTOMS: ICD-10-CM

## 2019-09-04 DIAGNOSIS — R31.0 GROSS HEMATURIA: Primary | ICD-10-CM

## 2019-09-04 PROCEDURE — 99213 OFFICE O/P EST LOW 20 MIN: CPT | Mod: PBBFAC | Performed by: UROLOGY

## 2019-09-04 PROCEDURE — 99214 PR OFFICE/OUTPT VISIT, EST, LEVL IV, 30-39 MIN: ICD-10-PCS | Mod: S$PBB,,, | Performed by: UROLOGY

## 2019-09-04 PROCEDURE — 99999 PR PBB SHADOW E&M-EST. PATIENT-LVL III: ICD-10-PCS | Mod: PBBFAC,,, | Performed by: UROLOGY

## 2019-09-04 PROCEDURE — 99214 OFFICE O/P EST MOD 30 MIN: CPT | Mod: S$PBB,,, | Performed by: UROLOGY

## 2019-09-04 PROCEDURE — 99999 PR PBB SHADOW E&M-EST. PATIENT-LVL III: CPT | Mod: PBBFAC,,, | Performed by: UROLOGY

## 2019-09-04 NOTE — PROGRESS NOTES
Subjective:      Patient ID: Mo Escobar is a 59 y.o. male.    Chief Complaint: Advice Only (per Dr. Adam)    HPI  Patient is a 59 y.o. male who is established to our clinic but new to me and was initially referred by their urologist, Dr. Mcconnell for evaluation of bph.   He has obstructive voiding symptoms for which he has previously undergone a TURP x 2.  He has had prostate regrowth and recurrent hematuria.  Gets gross hematuria 1x/month.  He is on coumadin---he had PE's 4-5 years ago.  He states he saw a hematologist and had a clotting disorder.  He does not know any more specifics.     Lab Results   Component Value Date    PSATOTAL 9.6 (H) 06/08/2017    PSAFREE 3.33 (H) 06/08/2017         Review of Systems  All other systems reviewed and negative except pertinent positives noted in HPI.    Objective:     Physical Exam   Constitutional: He is oriented to person, place, and time. He appears well-developed and well-nourished. No distress.   HENT:   Head: Normocephalic and atraumatic.   Eyes: No scleral icterus.   Neck: No tracheal deviation present.   Pulmonary/Chest: Effort normal. No respiratory distress.   Neurological: He is alert and oriented to person, place, and time.   Psychiatric: He has a normal mood and affect. His behavior is normal. Judgment and thought content normal.     Assessment:     1. Gross hematuria    2. Benign prostatic hyperplasia without lower urinary tract symptoms    3. S/P TURP      Plan:     1. Gross hematuria    2. Benign prostatic hyperplasia without lower urinary tract symptoms    3. S/P TURP        No orders of the defined types were placed in this encounter.    1. BPH:  -discussed his surgical options at length.  Given his recurrent gross hematuria and his empiric need for Coumadin, I do think it reasonable to do an outlet procedure given the prostatic regrowth.  I discussed that his prostate volume on MRI was 101 cc.  This but some right on the cusp of what is most  appropriate, a suprapubic prostatectomy versus a trans urethral resection of the prostate.  I did discuss that a suprapubic prostatectomy may carry a higher bleeding risk both intraoperatively as well as in the perioperative healing period.  We also discussed the risks of erectile dysfunction as well as urinary incontinence.  -based on review of his imaging, it appears that he has more of a median lobe causing his obstructive symptoms than his lateral lobe regrowth.  Therefore, I feel like a TURP would be a reasonable next step.  I discussed this with Dr. Mcconnell who will reach out to the patient and coordinate his care moving forward.

## 2019-09-05 ENCOUNTER — TELEPHONE (OUTPATIENT)
Dept: UROLOGY | Facility: CLINIC | Age: 59
End: 2019-09-05

## 2019-09-05 DIAGNOSIS — N40.1 BPH WITH URINARY OBSTRUCTION: Primary | ICD-10-CM

## 2019-09-05 DIAGNOSIS — N13.8 BPH WITH OBSTRUCTION/LOWER URINARY TRACT SYMPTOMS: ICD-10-CM

## 2019-09-05 DIAGNOSIS — N13.8 BPH WITH URINARY OBSTRUCTION: Primary | ICD-10-CM

## 2019-09-05 DIAGNOSIS — R58 BLEEDING: ICD-10-CM

## 2019-09-05 DIAGNOSIS — R82.998 CELLS AND CASTS IN URINE: ICD-10-CM

## 2019-09-05 DIAGNOSIS — N40.1 BPH WITH OBSTRUCTION/LOWER URINARY TRACT SYMPTOMS: ICD-10-CM

## 2019-09-05 NOTE — TELEPHONE ENCOUNTER
Patient advised.  He says the sooner the better.  If 9/26 is the first date, would agree, or any following Thursday.

## 2019-09-05 NOTE — TELEPHONE ENCOUNTER
Confirmed 9/26  Needs PAT early in week prior with U[facility]  No clearances, just detailed outline of coumadin/lovenox bridge from [facility] (I sent message)

## 2019-09-05 NOTE — TELEPHONE ENCOUNTER
----- Message from Mike Mcconnell MD sent at 9/4/2019  9:21 PM CDT -----  Discussed with ricardo after his consultation today, and feels that repeating turp/laser turp at this time is most appropriate. Get 1st 2nd and 3rd choice thursdays from his starting at least 2 weeks out so can coordinate with when laser will be available. Already reached out to dr jha to detail coumadin hold/lovenox bridge as he was just seen this week and noted need for bridging if we were to proceed with surgery.    ----- Message -----  From: Mike Eduardo MD  Sent: 9/4/2019   1:14 PM  To: Mike Mcconnell MD

## 2019-09-06 ENCOUNTER — TELEPHONE (OUTPATIENT)
Dept: HEMATOLOGY/ONCOLOGY | Facility: CLINIC | Age: 59
End: 2019-09-06

## 2019-09-06 ENCOUNTER — TELEPHONE (OUTPATIENT)
Dept: UROLOGY | Facility: CLINIC | Age: 59
End: 2019-09-06

## 2019-09-06 NOTE — TELEPHONE ENCOUNTER
Called pt with no answer. Called Wife she answered and was willing to take instructions for lovenox bridge. Instructed wife 7 days before procedure discontinue the coumandin, start lovenox 130mg twice a day. Day of procedure hold both lovenox and coumadin. Day after procedure, resume both coumadin and lovenox.  Obtain INR check 5 days after coumadin restart.

## 2019-09-06 NOTE — TELEPHONE ENCOUNTER
----- Message from Hayley Baltazar sent at 9/6/2019 10:28 AM CDT -----  Type:  Patient Returning Call    Who Called: pt   Who Left Message for Patient:   Viry   Does the patient know what this is regarding?:  Scheduled surgery   Best Call Back Number:  [phone]   Additional Information:  Informed patient the surgery is scheduled for 9/26 however please contact pt directly and give further instructions and advise on prepping     .

## 2019-09-06 NOTE — TELEPHONE ENCOUNTER
----- Message from Freida Salomon sent at 9/5/2019  2:56 PM CDT -----  Type:  Patient Returning Call    Who Called:  Patient  Who Left Message for Patient:  n/a  Does the patient know what this is regarding?:  Not sure  Best Call Back Number:  [phone] (home)

## 2019-09-10 ENCOUNTER — TELEPHONE (OUTPATIENT)
Dept: HEMATOLOGY/ONCOLOGY | Facility: CLINIC | Age: 59
End: 2019-09-10

## 2019-09-10 NOTE — TELEPHONE ENCOUNTER
Spoke with Pt. And he is informed of the lovenox bridge..----- Message from Dion Garrido MD sent at 9/10/2019  2:44 PM CDT -----  Check and make sure he was set up with a lovenox bridge      ----- Message -----  From: Mike Mcconnell MD  Sent: 9/6/2019   4:21 PM  To: Dion Garrido MD    Great thanks - surgery 9/26/19  ----- Message -----  From: Dion Garrido MD  Sent: 9/6/2019   8:52 AM  To: Mike Mcconnell MD    I am good with that plan; will set it up lovenox for him      ----- Message -----  From: Mike Mcconnell MD  Sent: 9/4/2019   9:18 PM  To: Dion Garrido MD    Thanks  Discussed with dr eduardo after consult today - tough case as his prostate regrows quickly and is a source of bleeding when it does, but after discussion, will proceed with another transurethral procedure, so please outline bridging details - when tohold coumadin and when to start lovenox (I forget what we did a few years ago). You ok with 48 hour preop lovenox hold, and restarting coumadin 48-72 hours later?    ----- Message -----  From: Dion Garrido MD  Sent: 9/3/2019  10:22 AM  To: Tevin Robles MD, Mike Eduardo MD, #

## 2019-09-10 NOTE — TELEPHONE ENCOUNTER
Noted in the Chart Pt wife was notified of the lovenox bridge----- Message from Dion Garrido MD sent at 9/10/2019  2:44 PM CDT -----  Check and make sure he was set up with a lovenox bridge      ----- Message -----  From: Mike Mcconnell MD  Sent: 9/6/2019   4:21 PM  To: Dion Garrido MD    Great thanks - surgery 9/26/19  ----- Message -----  From: Dion Garrido MD  Sent: 9/6/2019   8:52 AM  To: Mike Mcconnell MD    I am good with that plan; will set it up lovenox for him      ----- Message -----  From: Mike Mcconnell MD  Sent: 9/4/2019   9:18 PM  To: Dion Garrido MD    Thanks  Discussed with dr eduardo after consult today - tough case as his prostate regrows quickly and is a source of bleeding when it does, but after discussion, will proceed with another transurethral procedure, so please outline bridging details - when tohold coumadin and when to start lovenox (I forget what we did a few years ago). You ok with 48 hour preop lovenox hold, and restarting coumadin 48-72 hours later?    ----- Message -----  From: Dion Garrido MD  Sent: 9/3/2019  10:22 AM  To: Tevin Robles MD, Mike Eduardo MD, #

## 2019-09-17 ENCOUNTER — HOSPITAL ENCOUNTER (OUTPATIENT)
Dept: PREADMISSION TESTING | Facility: HOSPITAL | Age: 59
Discharge: HOME | End: 2019-09-17
Attending: UROLOGY
Payer: MEDICARE

## 2019-09-17 ENCOUNTER — HOSPITAL ENCOUNTER (OUTPATIENT)
Dept: RADIOLOGY | Facility: HOSPITAL | Age: 59
Discharge: HOME | End: 2019-09-17
Attending: NURSE PRACTITIONER
Payer: MEDICARE

## 2019-09-17 VITALS — BODY MASS INDEX: 39.68 KG/M2 | WEIGHT: 293 LBS | HEIGHT: 72 IN

## 2019-09-17 DIAGNOSIS — N13.8 BPH WITH URINARY OBSTRUCTION: ICD-10-CM

## 2019-09-17 DIAGNOSIS — N40.1 BPH WITH URINARY OBSTRUCTION: ICD-10-CM

## 2019-09-17 PROCEDURE — 99900103 DSU ONLY-NO CHARGE-INITIAL HR (STAT)

## 2019-09-17 PROCEDURE — 99900104 DSU ONLY-NO CHARGE-EA ADD'L HR (STAT)

## 2019-09-17 NOTE — DISCHARGE INSTRUCTIONS
To confirm, Your doctor has instructed you that surgery is scheduled for: 9/26/19    [place]  [phone]    Please report to [facility], Registration the morning of surgery. You must check-in and receive a wristband before going to your procedure.    Pre-Op will call the afternoon prior to surgery between 1:00 and 6:00 PM with the final arrival time.  Phone number: [phone]    PLEASE NOTE:  The surgery schedule has many variables which may affect the time of your surgery case.  Family members should be available if your surgery time changes.  Plan to be here the day of your procedure between 4-6 hours.    MEDICATIONS:  TAKE ONLY THESE MEDICATIONS WITH A SMALL SIP OF WATER THE MORNING OF YOUR PROCEDURE:    Levothyroxine, Cymbalta    DO NOT TAKE THESE MEDICATIONS 5-7 DAYS PRIOR to your procedure or per your surgeon's request: ASPIRIN, ALEVE, ADVIL, IBUPROFEN, FISH OIL VITAMIN E, HERBALS  (May take Tylenol)                                        WARFARIN  LAST DOSE - 9/17/19   BRIDGING WITH LOVENOX                                        ADDERALL LAST DOSE - 9/22/19    ONLY if you are prescribed any types of blood thinners such as:  Aspirin, Coumadin, Plavix, Pradaxa, Xarelto, Aggrenox, Effient, Eliquis, Savasya, Brilinta, or any other, ask your surgeon whether you should stop taking them and how long before surgery you should stop.  You may also need to verify with the prescribing physician if it is ok to stop your medication.      INSTRUCTIONS IMPORTANT!!  · Do not eat or drink anything between midnight and the time of your procedure- this includes gum, mints, and candy.  · Do not smoke or drink alcoholic beverages 24 hours prior to your procedure.  · Shower the night before AND the morning of your procedure with a Chlorhexidine wash such as Hibiclens or Dial antibacterial soap from the neck down.  Do not get it on your face or in your eyes.  You may use your own shampoo and face wash. This  helps your skin to be as bacteria free as possible.    · If you wear contact lenses, dentures, hearing aids or glasses, bring a container to put them in during surgery and give to a family member for safe keeping.  Please leave all jewelry, piercing's and valuables at home.   · DO NOT remove hair from the surgery site.  Do not shave the incision site unless you are given specific instructions to do so.    · ONLY if you have been diagnosed with sleep apnea please bring your C-PAP machine.  · ONLY if you wear home oxygen please bring your portable oxygen tank the day of your procedure.  · ONLY if you have a history of OPEN HEART SURGERY you will need a clearance from your Cardiologist per Anesthesia.      · ONLY for patients requiring bowel prep, written instructions will be given by your doctor's office.  · ONLY if you have a neuro stimulator, please bring the controller with you the morning of surgery  · ONLY if a type and screen test is needed before surgery, please return:  · If your doctor has scheduled you for an overnight stay, bring a small overnight bag with any personal items you need.  · Make arrangements in advance for transportation home by a responsible adult.  It is not safe to drive a vehicle during the 24 hours after anesthesia.      · Visiting hours are 10:00AM to 8:30PM.  For the safety of all patients, visitors under the age of 12 are not allowed above the first floor of the hospital.    · All [facility] facilities and properties are tobacco free.  Smoking is NOT allowed.       If you have any questions about these instructions, call Pre-Op Admit  Nursing at [phone] or the Pre-Op Day Surgery Unit at [phone].

## 2019-09-25 ENCOUNTER — ANESTHESIA EVENT (OUTPATIENT)
Dept: SURGERY | Facility: HOSPITAL | Age: 59
End: 2019-09-25
Payer: MEDICARE

## 2019-09-26 ENCOUNTER — ANESTHESIA (OUTPATIENT)
Dept: SURGERY | Facility: HOSPITAL | Age: 59
End: 2019-09-26
Payer: MEDICARE

## 2019-09-26 ENCOUNTER — HOSPITAL ENCOUNTER (OUTPATIENT)
Facility: HOSPITAL | Age: 59
Discharge: HOME | End: 2019-09-26
Attending: UROLOGY | Admitting: UROLOGY
Payer: MEDICARE

## 2019-09-26 DIAGNOSIS — N13.8 BPH WITH OBSTRUCTION/LOWER URINARY TRACT SYMPTOMS: ICD-10-CM

## 2019-09-26 DIAGNOSIS — N40.1 BPH WITH OBSTRUCTION/LOWER URINARY TRACT SYMPTOMS: ICD-10-CM

## 2019-09-26 DIAGNOSIS — D50.9 MICROCYTIC ANEMIA: ICD-10-CM

## 2019-09-26 DIAGNOSIS — R33.9 URINARY RETENTION: Primary | ICD-10-CM

## 2019-09-26 DIAGNOSIS — N40.1 BPH WITH URINARY OBSTRUCTION: ICD-10-CM

## 2019-09-26 DIAGNOSIS — N13.8 BPH WITH URINARY OBSTRUCTION: ICD-10-CM

## 2019-09-26 LAB
APTT BLDCRRT: 25.2 SEC (ref 21–32)
INR PPP: 0.9 (ref 0.8–1.2)
PROTHROMBIN TIME: 9.5 SEC (ref 9–12.5)

## 2019-09-26 PROCEDURE — 25000003 PHARM REV CODE 250: Performed by: ANESTHESIOLOGY

## 2019-09-26 PROCEDURE — 63600175 PHARM REV CODE 636 W HCPCS: Performed by: ANESTHESIOLOGY

## 2019-09-26 PROCEDURE — 52648 LASER SURGERY OF PROSTATE: CPT | Mod: ,,, | Performed by: UROLOGY

## 2019-09-26 PROCEDURE — 85730 THROMBOPLASTIN TIME PARTIAL: CPT

## 2019-09-26 PROCEDURE — 71000039 HC RECOVERY, EACH ADD'L HOUR: Performed by: UROLOGY

## 2019-09-26 PROCEDURE — 27201423 OPTIME MED/SURG SUP & DEVICES STERILE SUPPLY: Performed by: UROLOGY

## 2019-09-26 PROCEDURE — 71000015 HC POSTOP RECOV 1ST HR: Performed by: UROLOGY

## 2019-09-26 PROCEDURE — 36000706: Performed by: UROLOGY

## 2019-09-26 PROCEDURE — 71000033 HC RECOVERY, INTIAL HOUR: Performed by: UROLOGY

## 2019-09-26 PROCEDURE — 37000008 HC ANESTHESIA 1ST 15 MINUTES: Performed by: UROLOGY

## 2019-09-26 PROCEDURE — D9220A PRA ANESTHESIA: ICD-10-PCS | Mod: ,,, | Performed by: ANESTHESIOLOGY

## 2019-09-26 PROCEDURE — 99900103 DSU ONLY-NO CHARGE-INITIAL HR (STAT): Performed by: UROLOGY

## 2019-09-26 PROCEDURE — 63600175 PHARM REV CODE 636 W HCPCS: Performed by: NURSE ANESTHETIST, CERTIFIED REGISTERED

## 2019-09-26 PROCEDURE — 37000009 HC ANESTHESIA EA ADD 15 MINS: Performed by: UROLOGY

## 2019-09-26 PROCEDURE — 85610 PROTHROMBIN TIME: CPT

## 2019-09-26 PROCEDURE — 52648 PR LASER VAPORIZATION SURGERY PROSTATE, COMPLETE: ICD-10-PCS | Mod: ,,, | Performed by: UROLOGY

## 2019-09-26 PROCEDURE — 36000707: Performed by: UROLOGY

## 2019-09-26 PROCEDURE — D9220A PRA ANESTHESIA: Mod: ,,, | Performed by: ANESTHESIOLOGY

## 2019-09-26 PROCEDURE — 99900104 DSU ONLY-NO CHARGE-EA ADD'L HR (STAT): Performed by: UROLOGY

## 2019-09-26 PROCEDURE — 63600175 PHARM REV CODE 636 W HCPCS: Performed by: UROLOGY

## 2019-09-26 PROCEDURE — 36415 COLL VENOUS BLD VENIPUNCTURE: CPT

## 2019-09-26 RX ORDER — LIDOCAINE HYDROCHLORIDE 10 MG/ML
1 INJECTION, SOLUTION EPIDURAL; INFILTRATION; INTRACAUDAL; PERINEURAL ONCE
Status: DISCONTINUED | OUTPATIENT
Start: 2019-09-26 | End: 2019-09-26 | Stop reason: HOSPADM

## 2019-09-26 RX ORDER — PROPOFOL 10 MG/ML
VIAL (ML) INTRAVENOUS
Status: DISCONTINUED | OUTPATIENT
Start: 2019-09-26 | End: 2019-09-26

## 2019-09-26 RX ORDER — FENTANYL CITRATE 50 UG/ML
25 INJECTION, SOLUTION INTRAMUSCULAR; INTRAVENOUS EVERY 5 MIN PRN
Status: DISCONTINUED | OUTPATIENT
Start: 2019-09-26 | End: 2019-09-26 | Stop reason: HOSPADM

## 2019-09-26 RX ORDER — FENTANYL CITRATE 50 UG/ML
INJECTION, SOLUTION INTRAMUSCULAR; INTRAVENOUS
Status: DISCONTINUED | OUTPATIENT
Start: 2019-09-26 | End: 2019-09-26

## 2019-09-26 RX ORDER — DEXAMETHASONE SODIUM PHOSPHATE 4 MG/ML
INJECTION, SOLUTION INTRA-ARTICULAR; INTRALESIONAL; INTRAMUSCULAR; INTRAVENOUS; SOFT TISSUE
Status: DISCONTINUED | OUTPATIENT
Start: 2019-09-26 | End: 2019-09-26

## 2019-09-26 RX ORDER — OXYCODONE AND ACETAMINOPHEN 5; 325 MG/1; MG/1
1 TABLET ORAL EVERY 6 HOURS PRN
Qty: 10 TABLET | Refills: 0 | Status: ON HOLD | OUTPATIENT
Start: 2019-09-26 | End: 2019-10-18 | Stop reason: SDUPTHER

## 2019-09-26 RX ORDER — LIDOCAINE HCL/PF 100 MG/5ML
SYRINGE (ML) INTRAVENOUS
Status: DISCONTINUED | OUTPATIENT
Start: 2019-09-26 | End: 2019-09-26

## 2019-09-26 RX ORDER — CEFAZOLIN SODIUM 2 G/50ML
2 SOLUTION INTRAVENOUS
Status: COMPLETED | OUTPATIENT
Start: 2019-09-26 | End: 2019-09-26

## 2019-09-26 RX ORDER — ACETAMINOPHEN 10 MG/ML
INJECTION, SOLUTION INTRAVENOUS
Status: DISCONTINUED | OUTPATIENT
Start: 2019-09-26 | End: 2019-09-26

## 2019-09-26 RX ORDER — MIDAZOLAM HYDROCHLORIDE 1 MG/ML
INJECTION INTRAMUSCULAR; INTRAVENOUS
Status: DISCONTINUED | OUTPATIENT
Start: 2019-09-26 | End: 2019-09-26

## 2019-09-26 RX ORDER — LIDOCAINE HYDROCHLORIDE 20 MG/ML
JELLY TOPICAL
Qty: 5 ML | Refills: 0 | Status: SHIPPED | OUTPATIENT
Start: 2019-09-26

## 2019-09-26 RX ORDER — SODIUM CHLORIDE, SODIUM LACTATE, POTASSIUM CHLORIDE, CALCIUM CHLORIDE 600; 310; 30; 20 MG/100ML; MG/100ML; MG/100ML; MG/100ML
INJECTION, SOLUTION INTRAVENOUS CONTINUOUS
Status: DISCONTINUED | OUTPATIENT
Start: 2019-09-26 | End: 2019-09-26 | Stop reason: HOSPADM

## 2019-09-26 RX ORDER — CEFDINIR 300 MG/1
300 CAPSULE ORAL 2 TIMES DAILY
Qty: 10 CAPSULE | Refills: 0 | Status: SHIPPED | OUTPATIENT
Start: 2019-09-26 | End: 2019-10-01

## 2019-09-26 RX ORDER — ONDANSETRON HYDROCHLORIDE 2 MG/ML
INJECTION, SOLUTION INTRAMUSCULAR; INTRAVENOUS
Status: DISCONTINUED | OUTPATIENT
Start: 2019-09-26 | End: 2019-09-26

## 2019-09-26 RX ORDER — OXYCODONE HYDROCHLORIDE 5 MG/1
5 TABLET ORAL
Status: DISCONTINUED | OUTPATIENT
Start: 2019-09-26 | End: 2019-09-26 | Stop reason: HOSPADM

## 2019-09-26 RX ADMIN — CEFAZOLIN SODIUM 2 G: 2 SOLUTION INTRAVENOUS at 10:09

## 2019-09-26 RX ADMIN — FENTANYL CITRATE 50 MCG: 50 INJECTION, SOLUTION INTRAMUSCULAR; INTRAVENOUS at 11:09

## 2019-09-26 RX ADMIN — FENTANYL CITRATE 50 MCG: 50 INJECTION, SOLUTION INTRAMUSCULAR; INTRAVENOUS at 10:09

## 2019-09-26 RX ADMIN — SODIUM CHLORIDE, SODIUM LACTATE, POTASSIUM CHLORIDE, AND CALCIUM CHLORIDE: .6; .31; .03; .02 INJECTION, SOLUTION INTRAVENOUS at 08:09

## 2019-09-26 RX ADMIN — PROPOFOL 200 MG: 10 INJECTION, EMULSION INTRAVENOUS at 10:09

## 2019-09-26 RX ADMIN — MIDAZOLAM HYDROCHLORIDE 2 MG: 1 INJECTION, SOLUTION INTRAMUSCULAR; INTRAVENOUS at 10:09

## 2019-09-26 RX ADMIN — LIDOCAINE HYDROCHLORIDE 100 MG: 20 INJECTION, SOLUTION INTRAVENOUS at 10:09

## 2019-09-26 RX ADMIN — DEXAMETHASONE SODIUM PHOSPHATE 4 MG: 4 INJECTION, SOLUTION INTRAMUSCULAR; INTRAVENOUS at 10:09

## 2019-09-26 RX ADMIN — SODIUM CHLORIDE, SODIUM LACTATE, POTASSIUM CHLORIDE, AND CALCIUM CHLORIDE: .6; .31; .03; .02 INJECTION, SOLUTION INTRAVENOUS at 09:09

## 2019-09-26 RX ADMIN — OXYCODONE HYDROCHLORIDE 5 MG: 5 TABLET ORAL at 12:09

## 2019-09-26 RX ADMIN — FENTANYL CITRATE 50 MCG: 50 INJECTION, SOLUTION INTRAMUSCULAR; INTRAVENOUS at 12:09

## 2019-09-26 RX ADMIN — ONDANSETRON 4 MG: 2 INJECTION, SOLUTION INTRAMUSCULAR; INTRAVENOUS at 10:09

## 2019-09-26 RX ADMIN — ACETAMINOPHEN 1000 MG: 10 INJECTION, SOLUTION INTRAVENOUS at 11:09

## 2019-09-26 NOTE — INTERVAL H&P NOTE
The patient has been examined and the H&P has been reviewed:    Cysto 8/27:  Findings: significant prostatic regrowth with asymmetric ingrowth/regrowth of bilateral lateral lobes and significant ballvalving obstructing intravesical median lobe, seen similar to pre-TURP presentation. Prostatic varices and hypervascularity seen along median lobe and within proximal prostatic urethra. Left ureteral orifice clearly seen. Right obscured by median lobe.    Discussed SPP vs TURP, Consulted with Dr Eduardo, ultimately decided to proceed with TURP  lovenox bridge for his coumadin, held 48hrs    Anesthesia/Surgery risks, benefits and alternative options discussed and understood by patient/family.          Active Hospital Problems    Diagnosis  POA    BPH with obstruction/lower urinary tract symptoms [N40.1, N13.8]  Yes      Resolved Hospital Problems   No resolved problems to display.

## 2019-09-26 NOTE — PLAN OF CARE
Pt transferred to phase II. VSS, denies pain, has some discomfort from barker catheter, barker catheter draining clear urine, tolerated clear liquids without N/V. Report given to DEEPAK Dee.

## 2019-09-26 NOTE — BRIEF OP NOTE
[facility] Urology  Brief Operative/Discharge Note    Date: 09/26/2019    Staff Surgeon: Mike Mcconnell MD    Pre-Op Diagnosis:   bph with obstruction  Recurrent gross hematuria  Clotting disorder    Post-Op Diagnosis: same    Procedure(s) Performed:   Turp/tulvp (qanta)    Specimen(s): none    Anesthesia: General LMA anesthesia    Findings: sig obstruction and hypervascularity    Estimated Blood Loss: minimal    Drains: 22fr 3 way with 3rd port plugged    Complications: none    Disposition: pacu    Discharge home today status post uncomplicated procedure as above  Diet - resume home diet  Follow up: tues 10/1/19 by 0900 for barker removal, then will arrange MD follow up  Instructions:  Avoid strenuous activity while barker in place, drink lots of water, hold coumadin until Sunday 9/29, complete antibiotics, no straining for bowel movements (use stool softeners if needed), may see some blood in urine intermittently (pink/clear red koolaid ok as long as clear consistent and draining well - return if clots)  Meds:     Medication List      START taking these medications    cefdinir 300 MG capsule  Commonly known as:  OMNICEF  Take 1 capsule (300 mg total) by mouth 2 (two) times daily. for 5 days     lidocaine HCL 2% 2 % jelly  Commonly known as:  XYLOCAINE  Apply topically as needed. For barker irritation     oxyCODONE-acetaminophen 5-325 mg per tablet  Commonly known as:  PERCOCET  Take 1 tablet by mouth every 6 (six) hours as needed (pain not relieved by otc agents).        CONTINUE taking these medications    dextroamphetamine-amphetamine 30 mg Tab     DULoxetine 30 MG capsule  Commonly known as:  CYMBALTA     SYNTHROID 112 MCG tablet  Generic drug:  levothyroxine     warfarin 5 MG tablet  Commonly known as:  COUMADIN  TAKE ONE TABLET BY MOUTH ONCE DAILY           Where to Get Your Medications      These medications were sent to [id number] - [place], [place] - [address]  [address] [facility], [facility] [address]     Phone:  [phone]   · cefdinir 300 MG capsule  · lidocaine HCL 2% 2 % jelly  · oxyCODONE-acetaminophen 5-325 mg per tablet

## 2019-09-26 NOTE — ANESTHESIA PREPROCEDURE EVALUATION
09/26/2019  Mo Escobar is a 59 y.o., male.    Anesthesia Evaluation    I have reviewed the Patient Summary Reports.    I have reviewed the Nursing Notes.   I have reviewed the Medications.     Review of Systems  Hematology/Oncology:        Hematology Comments: DVT, on Coumadin, off one week, Bridge with Lovenox   Cardiovascular:  Cardiovascular Normal Exercise tolerance: good         Pulmonary:   Shortness of breath Sleep Apnea, CPAP        Physical Exam  General:  Morbid Obesity    Airway/Jaw/Neck:  Airway Findings: Mouth Opening: Normal Tongue: Normal  General Airway Assessment: Adult  Mallampati: IV  Improves to III with phonation.  Jaw/Neck Findings:  Neck ROM: Normal ROM      Dental:  Dental Findings: In tact   Chest/Lungs:  Chest/Lungs Findings: Clear to auscultation, Normal Respiratory Rate         Mental Status:  Mental Status Findings:  Alert and Oriented, Cooperative         Anesthesia Plan  Type of Anesthesia, risks & benefits discussed:  Anesthesia Type:  general  Patient's Preference:   Intra-op Monitoring Plan: standard ASA monitors  Intra-op Monitoring Plan Comments:   Post Op Pain Control Plan: IV/PO Opioids PRN  Post Op Pain Control Plan Comments:   Induction:   IV and Inhalation  Beta Blocker:  Patient is not currently on a Beta-Blocker (No further documentation required).       Informed Consent: Patient understands risks and agrees with Anesthesia plan.  Questions answered. Anesthesia consent signed with patient.  ASA Score: 3     Day of Surgery Review of History & Physical:            Ready For Surgery From Anesthesia Perspective.

## 2019-09-26 NOTE — ANESTHESIA POSTPROCEDURE EVALUATION
Anesthesia Post Evaluation    Patient: Mo Escobar    Procedure(s) Performed: Procedure(s) (LRB):  TURP, USING THULIUM LASER / quanta laser (N/A)  TURP (TRANSURETHRAL RESECTION OF PROSTATE) (N/A)    Final Anesthesia Type: general  Patient location during evaluation: PACU  Patient participation: Yes- Able to Participate  Level of consciousness: awake and alert and oriented  Post-procedure vital signs: reviewed and stable  Pain management: adequate  Airway patency: patent  PONV status at discharge: No PONV  Anesthetic complications: no      Cardiovascular status: blood pressure returned to baseline and stable  Respiratory status: unassisted and spontaneous ventilation  Hydration status: euvolemic  Follow-up not needed.          Vitals Value Taken Time   /74 9/26/2019  1:21 PM   Temp 36.8 °C (98.2 °F) 9/26/2019 12:20 PM   Pulse 52 9/26/2019  1:25 PM   Resp 22 9/26/2019  1:25 PM   SpO2 93 % 9/26/2019  1:25 PM   Vitals shown include unvalidated device data.      No case tracking events are documented in the log.      Pain/Abigail Score: Pain Rating Prior to Med Admin: 5 (9/26/2019 12:47 PM)  Abigail Score: 10 (9/26/2019  1:00 PM)

## 2019-09-26 NOTE — PLAN OF CARE
Dr. Woo cleared pt for discharge per DEEPAK Morales. Discharge instructions and handouts given. Pt educated on catheter care and leg bag. Questions answered. Pt and spouse verbalized understanding. Pt escorted off unit per this nurse without incident.

## 2019-09-26 NOTE — DISCHARGE INSTRUCTIONS
General Information:    1.  Do not drink alcoholic beverages including beer for 24 hours or as long as you are on pain medication..  2.  Do not drive a motor vehicle, operate machinery or power tools, or signs legal papers for 24 hours or as long as you are on pain medication.   3.  You may experience light-headedness, dizziness, and sleepiness following surgery. Please do not stay alone. A responsible adult should be with you for this 24 hour period.  4.  Go home and rest.    5. Progress slowly to a normal diet unless instructed.  Otherwise, begin with liquids such as soft drinks, then soup and crackers working up to solid foods. Drink plenty of nonalcoholic fluids.  6.  Certain anesthetics and pain medications produce nausea and vomiting in certain       individuals. If nausea becomes a problem at home, call you doctor.    7. A nurse will be calling you sometime after surgery. Do not be alarmed. This is our way of finding out how you are doing.    8. Several times every hour while you are awake, take 2-3 deep breaths and cough. If you had stomach surgery hold a pillow or rolled towel firmly against your stomach before you cough. This will help with any pain the cough might cause.  9. Several times every hour while you are awake, pump and flex your feet 5-6 times and do foot circles. This will help prevent blood clots.    10.Call your doctor for severe pain, bleeding, fever, or signs or symptoms of infection (pain, swelling, redness, foul odor, drainage).    Discharge Instructions: After Your Surgery/Procedure  Youve just had surgery. During surgery you were given medicine called anesthesia to keep you relaxed and free of pain. After surgery you may have some pain or nausea. This is common. Here are some tips for feeling better and getting well after surgery.     Stay on schedule with your medication.   Going home  Your doctor or nurse will show you how to take care of yourself when you go home. He or she will  "also answer your questions. Have an adult family member or friend drive you home.      For your safety we recommend these precaution for the first 24 hours after your procedure:  · Do not drive or use heavy equipment.  · Do not make important decisions or sign legal papers.  · Do not drink alcohol.  · Have someone stay with you, if needed. He or she can watch for problems and help keep you safe.  · Your concentration, balance, coordination, and judgement may be impaired for many hours after anesthesia.  Use caution when ambulating or standing up.     · You may feel weak and "washed out" after anesthesia and surgery.      Subtle residual effects of general anesthesia or sedation with regional / local anesthesia can last more than 24 hours.  Rest for the remainder of the day or longer if your Doctor/Surgeon has advised you to do so.  Although you may feel normal within the first 24 hours, your reflexes and mental ability may be impaired without you realizing it.  You may feel dizzy, lightheaded or sleepy for 24 hours or longer.      Be sure to go to all follow-up visits with your doctor. And rest after your surgery for as long as your doctor tells you to.  Coping with pain  If you have pain after surgery, pain medicine will help you feel better. Take it as told, before pain becomes severe. Also, ask your doctor or pharmacist about other ways to control pain. This might be with heat, ice, or relaxation. And follow any other instructions your surgeon or nurse gives you.  Tips for taking pain medicine  To get the best relief possible, remember these points:  · Pain medicines can upset your stomach. Taking them with a little food may help.  · Most pain relievers taken by mouth need at least 20 to 30 minutes to start to work.  · Taking medicine on a schedule can help you remember to take it. Try to time your medicine so that you can take it before starting an activity. This might be before you get dressed, go for a walk, " or sit down for dinner.  · Constipation is a common side effect of pain medicines. Call your doctor before taking any medicines such as laxatives or stool softeners to help ease constipation. Also ask if you should skip any foods. Drinking lots of fluids and eating foods such as fruits and vegetables that are high in fiber can also help. Remember, do not take laxatives unless your surgeon has prescribed them.  · Drinking alcohol and taking pain medicine can cause dizziness and slow your breathing. It can even be deadly. Do not drink alcohol while taking pain medicine.  · Pain medicine can make you react more slowly to things. Do not drive or run machinery while taking pain medicine.  Your health care provider may tell you to take acetaminophen to help ease your pain. Ask him or her how much you are supposed to take each day. Acetaminophen or other pain relievers may interact with your prescription medicines or other over-the-counter (OTC) drugs. Some prescription medicines have acetaminophen and other ingredients. Using both prescription and OTC acetaminophen for pain can cause you to overdose. Read the labels on your OTC medicines with care. This will help you to clearly know the list of ingredients, how much to take, and any warnings. It may also help you not take too much acetaminophen. If you have questions or do not understand the information, ask your pharmacist or health care provider to explain it to you before you take the OTC medicine.  Managing nausea  Some people have an upset stomach after surgery. This is often because of anesthesia, pain, or pain medicine, or the stress of surgery. These tips will help you handle nausea and eat healthy foods as you get better. If you were on a special food plan before surgery, ask your doctor if you should follow it while you get better. These tips may help:  · Do not push yourself to eat. Your body will tell you when to eat and how much.  · Start off with clear  liquids and soup. They are easier to digest.  · Next try semi-solid foods, such as mashed potatoes, applesauce, and gelatin, as you feel ready.  · Slowly move to solid foods. Dont eat fatty, rich, or spicy foods at first.  · Do not force yourself to have 3 large meals a day. Instead eat smaller amounts more often.  · Take pain medicines with a small amount of solid food, such as crackers or toast, to avoid nausea.     Call your surgeon if  · You still have pain an hour after taking medicine. The medicine may not be strong enough.  · You feel too sleepy, dizzy, or groggy. The medicine may be too strong.  · You have side effects like nausea, vomiting, or skin changes, such as rash, itching, or hives.       If you have obstructive sleep apnea  You were given anesthesia medicine during surgery to keep you comfortable and free of pain. After surgery, you may have more apnea spells because of this medicine and other medicines you were given. The spells may last longer than usual.   At home:  · Keep using the continuous positive airway pressure (CPAP) device when you sleep. Unless your health care provider tells you not to, use it when you sleep, day or night. CPAP is a common device used to treat obstructive sleep apnea.  · Talk with your provider before taking any pain medicine, muscle relaxants, or sedatives. Your provider will tell you about the possible dangers of taking these medicines.  © 2889-4219 [employer]. [address] [place] [address]. All rights reserved. This information is not intended as a substitute for professional medical care. Always follow your healthcare professional's instructions.    Post op instructions for prevention of DVT  What is deep vein thrombosis?  Deep vein thrombosis (DVT) is the medical term for blood clots in the deep veins of the leg.  These blood clots can be dangerous.  A DVT can block a blood vessel and keep blood from getting where it needs to go.   Another problem is that the clot can travel to other parts of the body such as the lungs.  A clot that travels to the lungs is called a pulmonary embolus (PE) and can cause serious problems with breathing which can lead to death.  Am I at risk for DVT/PE?  If you are not very active, you are at risk of DVT.  Anyone confined to bed, sitting for long periods of time, recovering from surgery, etc. increases the risk of DVT.  Other risk factors are cancer diagnosis, certain medications, estrogen replacement in any form,older age, obesity, pregnancy, smoking, history of clotting disorders, and dehydration.  How will I know if I have a DVT?   Swelling in the lower leg   Pain   Warmth, redness, hardness or bulging of the vein  If you have any of these symptoms, call your doctors office right away.  Some people will not have any symptoms until the clot moves to the lungs.  What are the symptoms of a PE?   Panting, shortness of breath, or trouble breathing   Sharp, knife-like chest pain when you breathe   Coughing or coughing up blood   Rapid heartbeat  If you have any of these symptoms or get worse quickly, call 911 for emergency treatment.  How can I prevent a DVT?   Avoid long periods of inactivity and dont cross your legs--get up and walk around every hour or so.   Stay active--walking after surgery is highly encouraged.  This means you should get out of the house and walk in the neighborhood.  Going up and down stairs will not impair healing (unless advised against such activity by your doctor).     Drink plenty of noncaffeinated, nonalcoholic fluids each day to prevent dehydration.   Wear special support stockings, if they have been advised by your doctor.   If you travel, stop at least once an hour and walk around.   Avoid smoking (assistance with stopping is available through your healthcare provider)  Always notify your doctor if you are not able to follow the post operative instructions that are  given to you at the time of discharge.  It may be necessary to prescribe one of the medications available to prevent DVT.    We hope your stay was comfortable as you heal now, mend and rest.    For we have enjoyed taking care of you by giving your our best.    And as you get better, by regaining your health and strength;   We count it as a privilege to have served you and hope your time at [facility] was well spent.      Thank  You!!!

## 2019-09-26 NOTE — TRANSFER OF CARE
Anesthesia Transfer of Care Note    Patient: Mo Escobar    Procedure(s) Performed: Procedure(s) (LRB):  TURP, USING THULIUM LASER / quanta laser (N/A)  TURP (TRANSURETHRAL RESECTION OF PROSTATE) (N/A)    Patient location: ICU    Anesthesia Type: general    Transport from OR: Transported from OR on 2-3 L/min O2 by NC with adequate spontaneous ventilation    Post assessment: no apparent anesthetic complications and tolerated procedure well    Post vital signs: stable    Level of consciousness: awake, alert and oriented    Nausea/Vomiting: no nausea/vomiting    Complications: none    Transfer of care protocol was followed      Last vitals:   Visit Vitals  BP (!) 155/72   Pulse 64   Temp 36.8 °C (98.2 °F) (Skin)   Resp 17   Ht 6' (1.829 m)   Wt 132.9 kg (293 lb)   SpO2 96%   BMI 39.74 kg/m²

## 2019-09-27 ENCOUNTER — TELEPHONE (OUTPATIENT)
Dept: UROLOGY | Facility: CLINIC | Age: 59
End: 2019-09-27

## 2019-09-27 VITALS
DIASTOLIC BLOOD PRESSURE: 71 MMHG | TEMPERATURE: 98 F | OXYGEN SATURATION: 97 % | HEART RATE: 53 BPM | SYSTOLIC BLOOD PRESSURE: 147 MMHG | RESPIRATION RATE: 16 BRPM | HEIGHT: 72 IN | BODY MASS INDEX: 39.68 KG/M2 | WEIGHT: 293 LBS

## 2019-09-27 NOTE — TELEPHONE ENCOUNTER
Please communicate with pt and dr jha office re lovenox bridging postop.    Bridged preop and held lovenox 48 hours.  Cannot have any anticoagulation until 48h postop and was doing lovenox BID.    Initial plan was to resume coumadin 48 hours postop, but patient under impression to bridge back.    My postop instructions were to resume coumadin Sunday Morning, though discussed would clarify with hemeonc and if needing lovenox at start to bridge back could start lovenox with PM dose Saturday    Cotter remaining in place until Tuesday to make sure no issues with gross hematuria on restarting bloodthinners. Has 3way with 3rd port plugged just in case. Urine was quite clear for entire recovery and at discharge.

## 2019-09-28 NOTE — OP NOTE
[facility] Urology Operative Report     Date: 09/26/2019     Staff Surgeon: Mike Mcconnell MD     Pre-Op Diagnosis:   BPH with obstruction  Gross hematuria  Clotting disorder     Post-Op Diagnosis:   Same    Procedure(s) Performed:   1. Transurethral thulium laser vaporization of prostate, [place]  2. Transurethral electrical vaporization of prostate, bipolar button electrode     Specimen(s): none     Anesthesia: General endotracheal     Findings: significant prostate obstruction with hypervascularity    Estimated Blood Loss: minimal     Drains: 22fr 3-way barker with 3rd port plugged     Complications: none     Indications for procdure:  Mr Young is a 60 yo M chronically anticoagulated for clotting disorder with history of significant BPH and gross hematuria of prostatic source.  Gross hematuria was intermittent ever since TURP in 2014 by outside MD. Saw him during hospital consultation for gross hematuria and retention in June 2017 after which I performed TURP/laser vaporization on 6/22/17 after negative prostate biopsy (with negative confirm MDx) for 92g gland. 20g resected, including median lobe, and remainder laser vaporized.  Was voiding well for a long time without hematuria which then started to recur intermittently and has been managed with finasteride, though more recently presented in urinary retention 5/20/19 of which urine that drained was clear, but later returned with clot retention per Barker catheter requiring manual irrigation x2 days, and ultimately Barker catheter was removed. Had recurrence of GH and clot retention on planned cystoscope day 7/9/19 and instead had significant manual irrigation to resolve clot retention. Since barker has been out, has been urinating ok but has had intermittent episodes of GH for which he has held coumadin a few days and increased hydration. Most recently 3 days ago. Historically episodes of GH had been after activity such as riding mower, boating, etc. Spontaneously  recently, though last episode this past weekend was with sons digging ditches. When GH started recurring in May 2019, ucx and cytology negative. 3T MRI had volume 85g. Cystoscopy 8/27/19 found: significant prostatic regrowth with asymmetric ingrowth/regrowth of bilateral lateral lobes and significant ballvalving obstructing intravesical median lobe, seen similar to pre-TURP presentation. Prostatic varices and hypervascularity seen along median lobe and within proximal prostatic urethra. Left ureteral orifice clearly seen. Right obscured by median lobe. Had consultation re: robotic suprapubic prostatectomy, and after risk benefit discussion elected to proceed with further transurethral management, using laser as well for hemostatic effect. Lovenox bridge arranged which was held for 48 hours preop.  After discussion of all risks and benefits, appropriate informed consent was obtained.     Procedure in detail:  After appropriate informed consent was obtained, the patient was taken back to the cystoscopy suite and placed in the lithotomy position. A WHO-approved time out was performed and preoperative antibiotics were given. He was then prepped and draped in the usual sterile fashion.        As I had recently perform cystoscopic evaluation, resectoscope was then assembled with visual obturator and passed into bladder via urethra noting normal anterior urethra and prostatic urethra with significant lateral lobe obstruction with hypervascularity and prominent blood vessels as well as moderate intravesical extension with bullous edema especially anteriorly, and asymmetric at bladder neck, though bilateral ureteral orifices seen in orthotopic position on trigone, right clearly, and left with prostatic tissue growing towards but not obstrucing it.      The bipolar resectoscope visual obturator was exchanged at this time for the handpiece with button electrode for initial use near bladder neck and trigone to vaporize tissue  away in this area for clear visualization before safe laser use. Loop avoided given significant hypervascularity to minimize opening venous sinuses. Button electrode also used to vaporize moderate anterior tissue, especially edematous component as not only hardest to reach with laser but also to take superficial irritated edematous tissue down before laser use. Surface of inferior channel from 5-7 o clock vaporized back to verumontanum as not only hypervascular but also for clear path of laser vaporization.     Concept Inboxnta laser scope which was assembled and passed per urethra into the bladder. His bladder was drained via the sheath, and laser vaporization was used to take down his bilateral lateral lobes systematically as well as remainder of moderate anterior obstructing tissue. Extensive laser vaporization performed until channel unobstructed both laterally and anteriorly. 150-180 joules energy used to perform laser vaporization of his large prostatic tissue burden after which low energy settings used to spot coagulate. When majority of prostate tissue vaporized and channel unobstructed, switched back to resectoscope with button for generous coagulation effect on surface of resection before barker placement.     Intermittently throughout the procedure, the verumontanum was visualized to that all resection remained proximal to it, and the trigone was intermittently inspected to see that all resection was a safe distance away from the ureteral orifices which were seen clearly and bilaterally the entire procedure.  After extensive, though adequate laser vaporization and bipolar vaporization of his obstructing prostate tissue, a patent open prostatic urethra was viewed with the scope placed at the verumontanum, with open patent bladder neck.  With the water and suction off there was mild anterior bleeding which was spot coagulated with the buttom, after which it appeared to be hemostatic with water and suction off, so the  scope was removed from the bladder.      A 22-Romanian Barker 3 way barker catheter with 3rd port plugged (given his previous history of delayed bleeding with bloodthinner use) was placed with 30cc in balloon, and 60cc cath tip syringe used to irrigate the bladder, all of which was clear.  His urine was noted clear at time of irrigation and very small punctate pinpoint fragments of prostate tissue residual from vaporization were irrigated free from his bladder. Barker catheter was placed to drainage bag. Urine in the barker bag was clear.       The patient tolerated the procedure well with no complications and was awakened and transferred to the recovery room in stable condition.       Disposition: Home today status post uncomplicated procedure as above. He will be given a course of postoperative prophylactic antibiotics and he will return on Tuesday 10/1 for barker removal nurse visit by 0900, as he will resume lovenox bridge to coumadin in 48 hours, then will make approximate 3 month MD followup.

## 2019-10-01 ENCOUNTER — CLINICAL SUPPORT (OUTPATIENT)
Dept: UROLOGY | Facility: CLINIC | Age: 59
End: 2019-10-01
Payer: MEDICARE

## 2019-10-01 DIAGNOSIS — N40.1 BPH WITH OBSTRUCTION/LOWER URINARY TRACT SYMPTOMS: ICD-10-CM

## 2019-10-01 DIAGNOSIS — R33.9 URINARY RETENTION: Primary | ICD-10-CM

## 2019-10-01 DIAGNOSIS — N13.8 BPH WITH OBSTRUCTION/LOWER URINARY TRACT SYMPTOMS: ICD-10-CM

## 2019-10-01 NOTE — PROGRESS NOTES
Patient here today to have his catheter removed post laser turp.      38ml saline removed from catheter balloon.   Catheter removed with no difficulty.   Leg bag contains 75ml of yellow/clear urine.   Patient instructed to drink 6-8 eight oz glasses on non-cafeinated beverages.  If unable to urinate by 2:30 this afternoon, come back to the clinic to have catheter replaced.

## 2019-10-10 ENCOUNTER — TELEPHONE (OUTPATIENT)
Dept: HEMATOLOGY/ONCOLOGY | Facility: CLINIC | Age: 59
End: 2019-10-10

## 2019-10-10 NOTE — TELEPHONE ENCOUNTER
Spoke with the patient and he had his procedure done. He has been on Coumadin since Sunday. Stopped Lovenox but I instructed him on the importance of continuing the Lovenox until INR is therapeutic. He verbalized understanding. He will increase to 6mg and recheck INR Monday.

## 2019-10-14 ENCOUNTER — HOSPITAL ENCOUNTER (INPATIENT)
Facility: HOSPITAL | Age: 59
LOS: 2 days | Discharge: HOME | DRG: 699 | End: 2019-10-18
Attending: HOSPITALIST | Admitting: HOSPITALIST
Payer: MEDICARE

## 2019-10-14 ENCOUNTER — OFFICE VISIT (OUTPATIENT)
Dept: UROLOGY | Facility: CLINIC | Age: 59
End: 2019-10-14
Payer: MEDICARE

## 2019-10-14 VITALS
SYSTOLIC BLOOD PRESSURE: 100 MMHG | WEIGHT: 284.38 LBS | BODY MASS INDEX: 38.52 KG/M2 | HEART RATE: 102 BPM | HEIGHT: 72 IN | TEMPERATURE: 98 F | RESPIRATION RATE: 18 BRPM | DIASTOLIC BLOOD PRESSURE: 67 MMHG

## 2019-10-14 DIAGNOSIS — R31.0 GROSS HEMATURIA: ICD-10-CM

## 2019-10-14 DIAGNOSIS — R07.9 CHEST PAIN: ICD-10-CM

## 2019-10-14 DIAGNOSIS — D50.9 MICROCYTIC ANEMIA: ICD-10-CM

## 2019-10-14 DIAGNOSIS — R33.8 CLOT RETENTION OF URINE: Primary | ICD-10-CM

## 2019-10-14 DIAGNOSIS — Z15.89 HETEROZYGOUS MTHFR MUTATION C677T: Primary | ICD-10-CM

## 2019-10-14 DIAGNOSIS — Z79.01 LONG TERM (CURRENT) USE OF ANTICOAGULANTS: ICD-10-CM

## 2019-10-14 DIAGNOSIS — I50.9 HEART FAILURE: ICD-10-CM

## 2019-10-14 DIAGNOSIS — R31.9 HEMATURIA: ICD-10-CM

## 2019-10-14 DIAGNOSIS — D50.0 ANEMIA DUE TO CHRONIC BLOOD LOSS: ICD-10-CM

## 2019-10-14 PROBLEM — D62 ACUTE BLOOD LOSS ANEMIA: Status: ACTIVE | Noted: 2019-10-14

## 2019-10-14 LAB
ALBUMIN SERPL BCP-MCNC: 3.7 G/DL (ref 3.5–5.2)
ALP SERPL-CCNC: 47 U/L (ref 55–135)
ALT SERPL W/O P-5'-P-CCNC: 45 U/L (ref 10–44)
ANION GAP SERPL CALC-SCNC: 10 MMOL/L (ref 8–16)
APTT BLDCRRT: 33.8 SEC (ref 21–32)
AST SERPL-CCNC: 26 U/L (ref 10–40)
BASOPHILS # BLD AUTO: 0.02 K/UL (ref 0–0.2)
BASOPHILS NFR BLD: 0.3 % (ref 0–1.9)
BILIRUB SERPL-MCNC: 0.3 MG/DL (ref 0.1–1)
BUN SERPL-MCNC: 11 MG/DL (ref 6–20)
CALCIUM SERPL-MCNC: 9.4 MG/DL (ref 8.7–10.5)
CHLORIDE SERPL-SCNC: 103 MMOL/L (ref 95–110)
CO2 SERPL-SCNC: 23 MMOL/L (ref 23–29)
CREAT SERPL-MCNC: 0.8 MG/DL (ref 0.5–1.4)
DIFFERENTIAL METHOD: ABNORMAL
EOSINOPHIL # BLD AUTO: 0 K/UL (ref 0–0.5)
EOSINOPHIL NFR BLD: 0.3 % (ref 0–8)
ERYTHROCYTE [DISTWIDTH] IN BLOOD BY AUTOMATED COUNT: 13.5 % (ref 11.5–14.5)
EST. GFR  (AFRICAN AMERICAN): >60 ML/MIN/1.73 M^2
EST. GFR  (NON AFRICAN AMERICAN): >60 ML/MIN/1.73 M^2
GLUCOSE SERPL-MCNC: 125 MG/DL (ref 70–110)
HCT VFR BLD AUTO: 29.8 % (ref 40–54)
HGB BLD-MCNC: 9.7 G/DL (ref 14–18)
IMM GRANULOCYTES # BLD AUTO: 0.03 K/UL (ref 0–0.04)
INR PPP: 2.1 (ref 0.8–1.2)
LYMPHOCYTES # BLD AUTO: 1.2 K/UL (ref 1–4.8)
LYMPHOCYTES NFR BLD: 15 % (ref 18–48)
MCH RBC QN AUTO: 27.2 PG (ref 27–31)
MCHC RBC AUTO-ENTMCNC: 32.6 G/DL (ref 32–36)
MCV RBC AUTO: 84 FL (ref 82–98)
MONOCYTES # BLD AUTO: 0.4 K/UL (ref 0.3–1)
MONOCYTES NFR BLD: 5 % (ref 4–15)
NEUTROPHILS # BLD AUTO: 6.3 K/UL (ref 1.8–7.7)
NEUTROPHILS NFR BLD: 79 % (ref 38–73)
NRBC BLD-RTO: 0 /100 WBC
PLATELET # BLD AUTO: 325 K/UL (ref 150–350)
PMV BLD AUTO: 10.3 FL (ref 9.2–12.9)
POTASSIUM SERPL-SCNC: 4.5 MMOL/L (ref 3.5–5.1)
PROT SERPL-MCNC: 6.5 G/DL (ref 6–8.4)
PROTHROMBIN TIME: 21.2 SEC (ref 9–12.5)
RBC # BLD AUTO: 3.57 M/UL (ref 4.6–6.2)
SODIUM SERPL-SCNC: 136 MMOL/L (ref 136–145)
WBC # BLD AUTO: 7.99 K/UL (ref 3.9–12.7)

## 2019-10-14 PROCEDURE — 36415 COLL VENOUS BLD VENIPUNCTURE: CPT

## 2019-10-14 PROCEDURE — 99999 PR PBB SHADOW E&M-EST. PATIENT-LVL III: CPT | Mod: PBBFAC,,, | Performed by: UROLOGY

## 2019-10-14 PROCEDURE — 51703 PR INSERTION OF TEMPORARY INDWELLING BLADDER CATHETERIZATION, COMPLICA: ICD-10-PCS | Mod: 22,58,S$PBB, | Performed by: UROLOGY

## 2019-10-14 PROCEDURE — 85025 COMPLETE CBC W/AUTO DIFF WBC: CPT

## 2019-10-14 PROCEDURE — 96374 THER/PROPH/DIAG INJ IV PUSH: CPT

## 2019-10-14 PROCEDURE — 51703 INSERT BLADDER CATH COMPLEX: CPT | Mod: 22,58,S$PBB, | Performed by: UROLOGY

## 2019-10-14 PROCEDURE — 25500020 PHARM REV CODE 255: Performed by: HOSPITALIST

## 2019-10-14 PROCEDURE — G0379 DIRECT REFER HOSPITAL OBSERV: HCPCS

## 2019-10-14 PROCEDURE — 99213 OFFICE O/P EST LOW 20 MIN: CPT | Mod: PBBFAC,PN | Performed by: UROLOGY

## 2019-10-14 PROCEDURE — 25000003 PHARM REV CODE 250: Performed by: HOSPITALIST

## 2019-10-14 PROCEDURE — 94761 N-INVAS EAR/PLS OXIMETRY MLT: CPT

## 2019-10-14 PROCEDURE — 96361 HYDRATE IV INFUSION ADD-ON: CPT

## 2019-10-14 PROCEDURE — 99024 POSTOP FOLLOW-UP VISIT: CPT | Mod: POP,,, | Performed by: UROLOGY

## 2019-10-14 PROCEDURE — 85610 PROTHROMBIN TIME: CPT | Mod: 91

## 2019-10-14 PROCEDURE — 80053 COMPREHEN METABOLIC PANEL: CPT

## 2019-10-14 PROCEDURE — 96376 TX/PRO/DX INJ SAME DRUG ADON: CPT

## 2019-10-14 PROCEDURE — 96375 TX/PRO/DX INJ NEW DRUG ADDON: CPT

## 2019-10-14 PROCEDURE — 85730 THROMBOPLASTIN TIME PARTIAL: CPT

## 2019-10-14 PROCEDURE — 51703 INSERT BLADDER CATH COMPLEX: CPT | Mod: PBBFAC,PN | Performed by: UROLOGY

## 2019-10-14 PROCEDURE — 99024 PR POST-OP FOLLOW-UP VISIT: ICD-10-PCS | Mod: POP,,, | Performed by: UROLOGY

## 2019-10-14 PROCEDURE — G0378 HOSPITAL OBSERVATION PER HR: HCPCS

## 2019-10-14 PROCEDURE — 63600175 PHARM REV CODE 636 W HCPCS: Performed by: HOSPITALIST

## 2019-10-14 PROCEDURE — 99999 PR PBB SHADOW E&M-EST. PATIENT-LVL III: ICD-10-PCS | Mod: PBBFAC,,, | Performed by: UROLOGY

## 2019-10-14 RX ORDER — DEXTROAMPHETAMINE SACCHARATE, AMPHETAMINE ASPARTATE, DEXTROAMPHETAMINE SULFATE AND AMPHETAMINE SULFATE 7.5; 7.5; 7.5; 7.5 MG/1; MG/1; MG/1; MG/1
30 TABLET ORAL DAILY
COMMUNITY

## 2019-10-14 RX ORDER — ONDANSETRON 2 MG/ML
4 INJECTION INTRAMUSCULAR; INTRAVENOUS EVERY 8 HOURS PRN
Status: DISCONTINUED | OUTPATIENT
Start: 2019-10-14 | End: 2019-10-18 | Stop reason: HOSPADM

## 2019-10-14 RX ORDER — LEVOTHYROXINE SODIUM 112 UG/1
112 TABLET ORAL DAILY
Status: DISCONTINUED | OUTPATIENT
Start: 2019-10-15 | End: 2019-10-18 | Stop reason: HOSPADM

## 2019-10-14 RX ORDER — IBUPROFEN 200 MG
24 TABLET ORAL
Status: DISCONTINUED | OUTPATIENT
Start: 2019-10-14 | End: 2019-10-18 | Stop reason: HOSPADM

## 2019-10-14 RX ORDER — MORPHINE SULFATE 2 MG/ML
2 INJECTION, SOLUTION INTRAMUSCULAR; INTRAVENOUS EVERY 4 HOURS PRN
Status: DISCONTINUED | OUTPATIENT
Start: 2019-10-14 | End: 2019-10-15

## 2019-10-14 RX ORDER — SODIUM CHLORIDE 9 MG/ML
INJECTION, SOLUTION INTRAVENOUS CONTINUOUS
Status: DISCONTINUED | OUTPATIENT
Start: 2019-10-14 | End: 2019-10-18

## 2019-10-14 RX ORDER — ENOXAPARIN SODIUM 150 MG/ML
130 INJECTION SUBCUTANEOUS
COMMUNITY

## 2019-10-14 RX ORDER — WARFARIN 1 MG/1
TABLET ORAL
Qty: 90 TABLET | Refills: 2 | Status: ON HOLD | OUTPATIENT
Start: 2019-10-14 | End: 2019-10-18 | Stop reason: HOSPADM

## 2019-10-14 RX ORDER — GLUCAGON 1 MG
1 KIT INJECTION
Status: DISCONTINUED | OUTPATIENT
Start: 2019-10-14 | End: 2019-10-18 | Stop reason: HOSPADM

## 2019-10-14 RX ORDER — MORPHINE SULFATE 10 MG/ML
2 INJECTION INTRAMUSCULAR; INTRAVENOUS; SUBCUTANEOUS EVERY 4 HOURS PRN
Status: DISCONTINUED | OUTPATIENT
Start: 2019-10-14 | End: 2019-10-14 | Stop reason: SDUPTHER

## 2019-10-14 RX ORDER — SODIUM CHLORIDE 0.9 % (FLUSH) 0.9 %
3 SYRINGE (ML) INJECTION EVERY 6 HOURS
Status: DISCONTINUED | OUTPATIENT
Start: 2019-10-14 | End: 2019-10-18 | Stop reason: HOSPADM

## 2019-10-14 RX ORDER — ACETAMINOPHEN 500 MG
1000 TABLET ORAL EVERY 8 HOURS PRN
Status: DISCONTINUED | OUTPATIENT
Start: 2019-10-14 | End: 2019-10-18 | Stop reason: HOSPADM

## 2019-10-14 RX ORDER — DEXTROAMPHETAMINE SACCHARATE, AMPHETAMINE ASPARTATE, DEXTROAMPHETAMINE SULFATE AND AMPHETAMINE SULFATE 7.5; 7.5; 7.5; 7.5 MG/1; MG/1; MG/1; MG/1
30 TABLET ORAL DAILY
Status: DISCONTINUED | OUTPATIENT
Start: 2019-10-15 | End: 2019-10-14

## 2019-10-14 RX ORDER — DULOXETIN HYDROCHLORIDE 30 MG/1
30 CAPSULE, DELAYED RELEASE ORAL DAILY
Status: DISCONTINUED | OUTPATIENT
Start: 2019-10-15 | End: 2019-10-18 | Stop reason: HOSPADM

## 2019-10-14 RX ORDER — IBUPROFEN 200 MG
16 TABLET ORAL
Status: DISCONTINUED | OUTPATIENT
Start: 2019-10-14 | End: 2019-10-18 | Stop reason: HOSPADM

## 2019-10-14 RX ORDER — DEXTROMETHORPHAN HYDROBROMIDE, GUAIFENESIN 5; 100 MG/5ML; MG/5ML
1950 LIQUID ORAL EVERY 8 HOURS
COMMUNITY

## 2019-10-14 RX ORDER — AMOXICILLIN 250 MG
1 CAPSULE ORAL 2 TIMES DAILY
Status: DISCONTINUED | OUTPATIENT
Start: 2019-10-14 | End: 2019-10-18 | Stop reason: HOSPADM

## 2019-10-14 RX ORDER — LIDOCAINE HYDROCHLORIDE 20 MG/ML
JELLY TOPICAL
Status: DISCONTINUED | OUTPATIENT
Start: 2019-10-14 | End: 2019-10-18 | Stop reason: HOSPADM

## 2019-10-14 RX ADMIN — SODIUM CHLORIDE: 0.9 INJECTION, SOLUTION INTRAVENOUS at 04:10

## 2019-10-14 RX ADMIN — MORPHINE SULFATE 2 MG: 2 INJECTION, SOLUTION INTRAMUSCULAR; INTRAVENOUS at 04:10

## 2019-10-14 RX ADMIN — IOHEXOL 100 ML: 350 INJECTION, SOLUTION INTRAVENOUS at 03:10

## 2019-10-14 RX ADMIN — MORPHINE SULFATE 2 MG: 2 INJECTION, SOLUTION INTRAMUSCULAR; INTRAVENOUS at 11:10

## 2019-10-14 RX ADMIN — SENNOSIDES, DOCUSATE SODIUM 1 TABLET: 50; 8.6 TABLET, FILM COATED ORAL at 10:10

## 2019-10-14 RX ADMIN — CEFTRIAXONE 1 G: 1 INJECTION, SOLUTION INTRAVENOUS at 04:10

## 2019-10-14 RX ADMIN — ACETAMINOPHEN 1000 MG: 500 TABLET ORAL at 10:10

## 2019-10-14 NOTE — PROGRESS NOTES
CBI at low titrated rate with bladder draining clear to light pink.  Manual irrigation with CBI paused still clear red. No clots aspirated.  Some resistance with balloon drawn towards bladder neck - either organized dependent clot near bladder neck or balloon into prostatic fossa.  Significantly improved since clinic visit earlier and would manage conservatively with CBI at this time.  Hold bloodthinners minimum 24 hours at this time pending resolution.  NPO after midnight as precaution only.  CBI with NS  - ok to manually irrigate prn with Sterile water prn poor drainage or increasing cbi requirements  Will check formal bladder ultrasound to screen for presence of organized clot

## 2019-10-14 NOTE — MEDICAL/APP STUDENT
[facility]  History & Physical    Subjective:      Chief Complaint/Reason for Admission: Urinary rentention and hematuria    Mr. Escobar is a 59 year old man with a past medical history of thrombophilia with PE and DVTs, hypothyroidism, anemia, and sleep apnea who presented to the ED on 10/13 at night with hematuria and urinary rentention. Patient said he was urinating last night when he thought he saw a clot of blood. Since that clot it has become more and more difficult for him to urinate with hematuria. He began to have pelvic pain which he describes at bladder spasms with bearing down to try to pee. Eventually he was unable to urinate at all and presented to the ED. In the ED they were able to insert a catheter which revealed gross hematuria. The bladder was irrigated with 4 L of sterile saline. Patient was discharged from the ED and had an appointment with Dr. Woo the next morning on 10/14. Dr. Woo noted blood in the bag at presentation and irrigated the bladder in the office. He was diaphoretic and pale in office. Mr. Escobar was directly admitted for continuous bladder irrigation and coagulation workup.    Patient had a TURP on 9/26 by Dr. Woo. Pt had his anti-coag stopped before the procedure but was advised to restart it 48 hrs post-op with lovenox bridge to warfarin due to his hypercoagulable state. Pt had his Cotter pulled on 10/1 and had no hematuria at that time. Plan was for patient to bridge for a week but INR were lower than goal. Warfarin was increased from 5 mg to 6 mg on Friday and has been using Lovenox since 48 hours post-op because sub-goal INR.  Patient said he has had gross hematuria with clots in the past. He also endorses new onset SOB, dizziness with standing, and weakness since the hematuria began last night. He also has pelvic pain since last night and low back pain which is chronic.     Past Medical History:   Diagnosis Date    Acute deep vein thrombosis  (DVT) of popliteal vein of right lower extremity 2017    Anemia due to chronic blood loss 10/3/2017    Anemia due to chronic blood loss 10/3/2017    Anticoagulant long-term use     Blood clotting disorder     Dr. Garrido    DVT (deep venous thrombosis)     Cotter catheter in place     Heterozygous MTHFR mutation [id number] 2017    Heterozygous MTHFR mutation [id number] 2017    Microcytic anemia 10/3/2017    Pulmonary emboli     Pulmonary embolism       - Due to clotting disorder    Sleep apnea     uses c-pap    Thyroid disease      Past Surgical History:   Procedure Laterality Date    BACK SURGERY      CARDIAC CATHETERIZATION          CYSTOSCOPY N/A 2019    Procedure: CYSTOSCOPY;  Surgeon: Mike Mcconnell MD;  Location: [facility] OR;  Service: Urology;  Laterality: N/A;    HAND TENDON SURGERY      IRRIGATION OF BLADDER N/A 2019    Procedure: IRRIGATION, BLADDER;  Surgeon: Mike Mcconnell MD;  Location: [facility] OR;  Service: Urology;  Laterality: N/A;    TRANSURETHRAL RESECTION OF PROSTATE N/A 2019    Procedure: TURP (TRANSURETHRAL RESECTION OF PROSTATE);  Surgeon: Mike Mcconnell MD;  Location: [facility] OR;  Service: Urology;  Laterality: N/A;    TRANSURETHRAL RESECTION OF PROSTATE (TURP) USING THULIUM LASER N/A 2019    Procedure: TURP, USING THULIUM LASER / quanta laser;  Surgeon: Mike Mcconnell MD;  Location: [facility] OR;  Service: Urology;  Laterality: N/A;  QUANTA LASER; [id number] confirmed with Kelsie scanlon [facility]/prostate laser  330pm -- estimated case time 9am -- WILL NEED TO CONFIRM/UPDATE TIME CLOSER TO     Family History   Problem Relation Age of Onset    Cancer Mother     No Known Problems Father      Social History     Tobacco Use    Smoking status: Former Smoker     Packs/day: 1.00     Years: 7.00     Pack years: 7.00     Types: Cigarettes     Last attempt to quit:      Years since quittin.7    Smokeless tobacco: Never Used    Substance Use Topics    Alcohol use: No    Drug use: No   Lives with wife and younger son (25)  On disability, former   Good mobility  Has 3 sons and 1 daughter    -3/4 have same hypercoag. disorder     PTA Medications   Medication Sig    acetaminophen (TYLENOL) 650 MG TbSR Take 1,950 mg by mouth every 8 (eight) hours.    dextroamphetamine-amphetamine (ADDERALL) 30 mg Tab Take 30 mg by mouth once daily.    DULoxetine (CYMBALTA) 30 MG capsule Take 30 mg by mouth once daily.    enoxaparin (LOVENOX) 150 mg/mL Syrg Inject 130 mg into the skin every 12 (twelve) hours.     levothyroxine (SYNTHROID) 112 MCG tablet Take 112 mcg by mouth once daily.    lidocaine HCL 2% (XYLOCAINE) 2 % jelly Apply topically as needed. For barker irritation    warfarin (COUMADIN) 1 MG tablet TAKE 1 TO 4 TABLETS BY MOUTH DAILY AS DIRECTED BY PHYSICIAN    warfarin (COUMADIN) 5 MG tablet TAKE ONE TABLET BY MOUTH ONCE DAILY    oxyCODONE-acetaminophen (PERCOCET) 5-325 mg per tablet Take 1 tablet by mouth every 6 (six) hours as needed (pain not relieved by otc agents). (Patient not taking: Reported on 10/14/2019)    [DISCONTINUED] dextroamphetamine-amphetamine 30 mg Tab Take 1 tablet by mouth 2 (two) times daily.     Review of patient's allergies indicates:  No Known Allergies     Review of Systems   Constitutional: Negative for chills and fever.   HENT: Negative for congestion and sore throat.    Respiratory: Positive for shortness of breath. Negative for cough.    Cardiovascular: Negative for chest pain, palpitations and orthopnea.   Gastrointestinal: Negative for abdominal pain, blood in stool, constipation, diarrhea, melena, nausea and vomiting.   Genitourinary: Positive for dysuria and hematuria.   Musculoskeletal: Positive for back pain.   Neurological: Positive for dizziness. Negative for tingling and sensory change.       Objective:      Vital Signs (Most Recent)  Temp: 98.4 °F (36.9 °C) (10/14/19 1354)  Pulse: (!) 57  (10/14/19 1354)  Resp: 20 (10/14/19 1354)  BP: 127/62 (10/14/19 1354)  SpO2: 98 % (10/14/19 1354)    Vital Signs Range (Last 24H):  Temp:  [97.5 °F (36.4 °C)-98.4 °F (36.9 °C)]   Pulse:  []   Resp:  [18-22]   BP: (100-176)/()   SpO2:  [96 %-98 %]     Physical Exam   General:Patient was lying comfortably in bed  HEENT:No epistaxis, np rhintis  CVS:regular rate and rhythm, S1 and S2 are heard, no added heart sounds or murmurs, No JVD, mild bilateral ankle swelling  Resp:Lungs clear and symmetric  GI:Brusing throughout the abdomen, abdo non-distended and non-tender, active bowel sounds, pelvic pain to palpitations  :Grossly bloody urine in barker bag  MSK:Normal ROM of upper and lower limbs  Skin: Bruising on the abdomen from lovenox injections    Neuro: Alert, grossly moving upper and lower limbs    Labs  PT=23.5  INR=2.3  UA: >100 RBCs, 15 WBCs, culture pending         Assessment:    Mr. Escobar is a 59 year old man here for acute urinary retention and gross hematuria here for continuous bladder irrigation and investigation of hematuria.  Active Hospital Problems    Diagnosis  POA    Hematuria [R31.9]  Yes      Resolved Hospital Problems   No resolved problems to display.       Plan:    Hematuria   Patient has had gross hematuria since last night with acute urinary rentention  Patient had his warfarin dose increased on Friday and has been on Lovenox since 9/28  INR 2.3  PT=23.5  Holding warfarin and lovenox  Pt on continous bladder irrigation  Trend INR   Could give Vit K if needed for cysto, reassess in 24 hrs off anti-coag  Order CBC    -Patient has had gross hematuria and SOB and dizziness    FOBT to check for GI bleeds  Follow culture of urine  Urology recommends ceftriaxone 1 g q24h    Hypothyroidism   Continue home levothyroxine 112 mcg     Low back pain  Patient takes 650 mgs of tylenol TID  CMP to check LFTs   PRN tylenol, PRN morphine    Depression/Anxiety  Continue duloxetine 30 mg daily

## 2019-10-14 NOTE — PROGRESS NOTES
[facility] UROLOGY    Emergency add on clinic visit.  59-year-old man known to me for history of prostatic obstruction and prostatic source bleeding with recurrent gross hematuria and prostate regrowth, with clotting disorder and chronic anticoagulation managed by Hematology.  TURP 9/26/19.  Cotter removed 10/1/19  Postoperatively was using a Lovenox bridge until he was therapeutic on his INR from his Coumadin, which he had not been yet as of last week and his Coumadin dose was increased on Friday and he continue to use Lovenox.    Yesterday he did pass a blood clot on progressed to raghu gross hematuria with clots and went to the emergency department last night.  He did take Coumadin yesterday morning and Lovenox yesterday morning and evening.  Emergency department notes indicate unable to place larger catheter in place a 16 American coude Cotter catheter and manually irrigated 4 L.    Patient called in this morning with gross blood coming from the bag and pain in the bladder and presents in clot retention  On triage, blood pressure 100/67, pulse 102, diaphoretic.  Indicated he has had a blood clot before and felt similarly.    Cotter catheter bag with gross raghu blood and blood clots.  Indicate this is the 4th Cotter bag of pure blood    Postvoid residual bladder scan 158 cc despite Cotter catheter drainage.  Blood visible in bag.  Given overall clinical appearance discuss going to the emergency department, however after resting felt better and repeat vital signs improved with pulse of 66 and blood pressure of 119/77.  He had gotten his color back, and while he was in clinic we did discuss trouble shooting his catheter and manually irrigating before presentation to the hospital for further evaluation    A 60 cc catheter tip syringe with sterile water was used to flush and irrigate his current indwelling 16 American coude Cotter catheter to temporize in and get it draining.  He did experience mild relief however there was  significant gross blood coming from this catheter, and since ER reportedly cannot place larger catheter, therefore I removed this catheter and used a 24 Setswana coude-tip hematuria 3way Barker catheter, using aseptic technique, and after instilling xylocaine jelly per urethra, and was able to advance into the bladder with bloody urine draining.  Balloon only inflated with 10 cc of sterile water and 60 cc catheter tip syringe used to manually irrigate until Barker was draining.  Third port of the catheter was plugged with a Barker plug during this to facilitate manual irrigation and I did use 2 L of sterile water to manually irrigate and did not remove significant clots, though there was resistance at the bladder neck near the balloon.  Ultimately started continuous bladder irrigation with a 3 L bag of normal saline.  - significant time spent in complex barker change beyond expected due to gross hematuria and need for manual irrigation    I discussed the case with hospitalist, Dr. Mondragon, for direct admit to the hospital for continuous bladder irrigation and also further workup given his history of coagulopathy and blood clots, as well as close monitoring, and reversal of his coagulopathy to help resolve his bleeding.  Should this not resolve with reversing coagulopathy, continuous bladder irrigation, manual bladder irrigation, may need clot evacuation in the OR.  Would not do this until his INR comes down.    Will follow while in the hospital.

## 2019-10-14 NOTE — NURSING
Met pt in the outpatient registration area as pt was brought to the hospital by a nurse. Pt escorted to room 315A via wheel chair. CBI in progress upon admit with a 3 way catheter.

## 2019-10-15 PROBLEM — Z79.01 CHRONIC ANTICOAGULATION: Status: ACTIVE | Noted: 2019-10-15

## 2019-10-15 LAB
ALBUMIN SERPL BCP-MCNC: 3.4 G/DL (ref 3.5–5.2)
ANION GAP SERPL CALC-SCNC: 8 MMOL/L (ref 8–16)
ANION GAP SERPL CALC-SCNC: 8 MMOL/L (ref 8–16)
BASOPHILS # BLD AUTO: 0.04 K/UL (ref 0–0.2)
BASOPHILS NFR BLD: 0.6 % (ref 0–1.9)
BUN SERPL-MCNC: 10 MG/DL (ref 6–20)
BUN SERPL-MCNC: 10 MG/DL (ref 6–20)
CALCIUM SERPL-MCNC: 8.7 MG/DL (ref 8.7–10.5)
CALCIUM SERPL-MCNC: 8.7 MG/DL (ref 8.7–10.5)
CHLORIDE SERPL-SCNC: 107 MMOL/L (ref 95–110)
CHLORIDE SERPL-SCNC: 107 MMOL/L (ref 95–110)
CO2 SERPL-SCNC: 22 MMOL/L (ref 23–29)
CO2 SERPL-SCNC: 22 MMOL/L (ref 23–29)
CREAT SERPL-MCNC: 0.8 MG/DL (ref 0.5–1.4)
CREAT SERPL-MCNC: 0.8 MG/DL (ref 0.5–1.4)
DIFFERENTIAL METHOD: ABNORMAL
EOSINOPHIL # BLD AUTO: 0.2 K/UL (ref 0–0.5)
EOSINOPHIL NFR BLD: 2.4 % (ref 0–8)
ERYTHROCYTE [DISTWIDTH] IN BLOOD BY AUTOMATED COUNT: 13.8 % (ref 11.5–14.5)
EST. GFR  (AFRICAN AMERICAN): >60 ML/MIN/1.73 M^2
EST. GFR  (AFRICAN AMERICAN): >60 ML/MIN/1.73 M^2
EST. GFR  (NON AFRICAN AMERICAN): >60 ML/MIN/1.73 M^2
EST. GFR  (NON AFRICAN AMERICAN): >60 ML/MIN/1.73 M^2
FERRITIN SERPL-MCNC: 13 NG/ML (ref 20–300)
GLUCOSE SERPL-MCNC: 158 MG/DL (ref 70–110)
GLUCOSE SERPL-MCNC: 158 MG/DL (ref 70–110)
HCT VFR BLD AUTO: 27.3 % (ref 40–54)
HGB BLD-MCNC: 8.7 G/DL (ref 14–18)
IMM GRANULOCYTES # BLD AUTO: 0.04 K/UL (ref 0–0.04)
INR PPP: 1.6 (ref 0.8–1.2)
IRON SERPL-MCNC: 37 UG/DL (ref 45–160)
LYMPHOCYTES # BLD AUTO: 1.7 K/UL (ref 1–4.8)
LYMPHOCYTES NFR BLD: 26.8 % (ref 18–48)
MCH RBC QN AUTO: 26.8 PG (ref 27–31)
MCHC RBC AUTO-ENTMCNC: 31.9 G/DL (ref 32–36)
MCV RBC AUTO: 84 FL (ref 82–98)
MONOCYTES # BLD AUTO: 0.4 K/UL (ref 0.3–1)
MONOCYTES NFR BLD: 5.9 % (ref 4–15)
NEUTROPHILS # BLD AUTO: 4 K/UL (ref 1.8–7.7)
NEUTROPHILS NFR BLD: 63.7 % (ref 38–73)
NRBC BLD-RTO: 0 /100 WBC
PHOSPHATE SERPL-MCNC: 2.4 MG/DL (ref 2.7–4.5)
PLATELET # BLD AUTO: 298 K/UL (ref 150–350)
PMV BLD AUTO: 10.6 FL (ref 9.2–12.9)
POTASSIUM SERPL-SCNC: 3.8 MMOL/L (ref 3.5–5.1)
POTASSIUM SERPL-SCNC: 3.8 MMOL/L (ref 3.5–5.1)
PROTHROMBIN TIME: 16.8 SEC (ref 9–12.5)
RBC # BLD AUTO: 3.25 M/UL (ref 4.6–6.2)
SATURATED IRON: 8 % (ref 20–50)
SODIUM SERPL-SCNC: 137 MMOL/L (ref 136–145)
SODIUM SERPL-SCNC: 137 MMOL/L (ref 136–145)
TOTAL IRON BINDING CAPACITY: 451 UG/DL (ref 250–450)
TRANSFERRIN SERPL-MCNC: 305 MG/DL (ref 200–375)
WBC # BLD AUTO: 6.27 K/UL (ref 3.9–12.7)

## 2019-10-15 PROCEDURE — 82728 ASSAY OF FERRITIN: CPT

## 2019-10-15 PROCEDURE — 51700 PR IRRIGATION, BLADDER: ICD-10-PCS | Mod: 78,,, | Performed by: UROLOGY

## 2019-10-15 PROCEDURE — 81000 URINALYSIS NONAUTO W/SCOPE: CPT

## 2019-10-15 PROCEDURE — G0378 HOSPITAL OBSERVATION PER HR: HCPCS

## 2019-10-15 PROCEDURE — 85610 PROTHROMBIN TIME: CPT

## 2019-10-15 PROCEDURE — 96376 TX/PRO/DX INJ SAME DRUG ADON: CPT

## 2019-10-15 PROCEDURE — 25000003 PHARM REV CODE 250: Performed by: HOSPITALIST

## 2019-10-15 PROCEDURE — 85025 COMPLETE CBC W/AUTO DIFF WBC: CPT

## 2019-10-15 PROCEDURE — 96361 HYDRATE IV INFUSION ADD-ON: CPT

## 2019-10-15 PROCEDURE — 80069 RENAL FUNCTION PANEL: CPT

## 2019-10-15 PROCEDURE — 36415 COLL VENOUS BLD VENIPUNCTURE: CPT

## 2019-10-15 PROCEDURE — A4216 STERILE WATER/SALINE, 10 ML: HCPCS | Performed by: HOSPITALIST

## 2019-10-15 PROCEDURE — 51700 IRRIGATION OF BLADDER: CPT | Mod: 78,,, | Performed by: UROLOGY

## 2019-10-15 PROCEDURE — 87086 URINE CULTURE/COLONY COUNT: CPT

## 2019-10-15 PROCEDURE — 94761 N-INVAS EAR/PLS OXIMETRY MLT: CPT

## 2019-10-15 PROCEDURE — 83540 ASSAY OF IRON: CPT

## 2019-10-15 PROCEDURE — 63600175 PHARM REV CODE 636 W HCPCS: Performed by: HOSPITALIST

## 2019-10-15 RX ORDER — HYDROMORPHONE HYDROCHLORIDE 2 MG/ML
0.5 INJECTION, SOLUTION INTRAMUSCULAR; INTRAVENOUS; SUBCUTANEOUS EVERY 4 HOURS PRN
Status: DISCONTINUED | OUTPATIENT
Start: 2019-10-15 | End: 2019-10-18 | Stop reason: HOSPADM

## 2019-10-15 RX ORDER — DOCUSATE SODIUM 100 MG/1
100 CAPSULE, LIQUID FILLED ORAL 2 TIMES DAILY
Status: DISCONTINUED | OUTPATIENT
Start: 2019-10-15 | End: 2019-10-15

## 2019-10-15 RX ADMIN — LIDOCAINE HYDROCHLORIDE: 20 JELLY TOPICAL at 12:10

## 2019-10-15 RX ADMIN — HYDROMORPHONE HYDROCHLORIDE 0.5 MG: 2 INJECTION, SOLUTION INTRAMUSCULAR; INTRAVENOUS; SUBCUTANEOUS at 11:10

## 2019-10-15 RX ADMIN — SODIUM CHLORIDE: 0.9 INJECTION, SOLUTION INTRAVENOUS at 06:10

## 2019-10-15 RX ADMIN — ACETAMINOPHEN 1000 MG: 500 TABLET ORAL at 06:10

## 2019-10-15 RX ADMIN — Medication 3 ML: at 11:10

## 2019-10-15 RX ADMIN — SENNOSIDES, DOCUSATE SODIUM 1 TABLET: 50; 8.6 TABLET, FILM COATED ORAL at 08:10

## 2019-10-15 RX ADMIN — LEVOTHYROXINE SODIUM 112 MCG: 112 TABLET ORAL at 09:10

## 2019-10-15 RX ADMIN — SENNOSIDES, DOCUSATE SODIUM 1 TABLET: 50; 8.6 TABLET, FILM COATED ORAL at 09:10

## 2019-10-15 RX ADMIN — DULOXETINE 30 MG: 30 CAPSULE, DELAYED RELEASE ORAL at 09:10

## 2019-10-15 RX ADMIN — CEFTRIAXONE 1 G: 1 INJECTION, SOLUTION INTRAVENOUS at 03:10

## 2019-10-15 NOTE — PLAN OF CARE
Met with pt to complete his assessment. Pt's wife was at bedside.  Pt, who is independent with his self care, has a cpap at home, denies home health agency and lives with his wife and adult son.  Pt's PCP is Eleonora Renteria NP, urologist is Dr. Cowan, hematology is Dr. Garrido and insurance is Medicare.  Pt's discharge disposition is home with no anticipated needs.        10/15/19 1450   Discharge Assessment   Assessment Type Discharge Planning Assessment   Confirmed/corrected address and phone number on facesheet? Yes   Assessment information obtained from? Patient   Prior to hospitilization cognitive status: Alert/Oriented   Prior to hospitalization functional status: Independent;Assistive Equipment   Current cognitive status: Alert/Oriented   Current Functional Status: Independent   Lives With spouse;child(karan), adult   Able to Return to Prior Arrangements yes   Is patient able to care for self after discharge? Yes   Who are your caregiver(s) and their phone number(s)?   (wife, Gregoria Escobar, [phone])   Patient's perception of discharge disposition home or selfcare   Readmission Within the Last 30 Days no previous admission in last 30 days   Patient currently being followed by outpatient case management? No   Patient currently receives any other outside agency services? No   Equipment Currently Used at Home CPAP   Do you have any problems affording any of your prescribed medications? No  (pharmacy is [facility] on [address] in [place])   Is the patient taking medications as prescribed? yes   Does the patient have transportation home? Yes   Transportation Anticipated family or friend will provide   Does the patient receive services at the Coumadin Clinic? No   Discharge Plan A Home with family   DME Needed Upon Discharge  none   Patient/Family in Agreement with Plan yes

## 2019-10-15 NOTE — NURSING
Manual irrigation complete with ease and no clots noted, clear to light pink with irrigation.  Irrigation port plugged per Dr. Mcconnell request.  Pt tolerated the irrigation well and denies need for anything.

## 2019-10-15 NOTE — SUBJECTIVE & OBJECTIVE
Interval History:   No clots overnight /free urine flow/pain resolved wrt gu issues unless he moves   afebrile.   C/p back pain-improved with sitting up       Review of Systems   Constitutional: Negative for chills and fever.   HENT: Negative for congestion and nosebleeds.    Respiratory: Negative for cough and shortness of breath.    Cardiovascular: Negative for chest pain, palpitations and leg swelling.   Gastrointestinal: Negative for blood in stool and constipation.   Genitourinary: Positive for hematuria. Negative for difficulty urinating and dysuria.   Musculoskeletal: Positive for back pain. Negative for arthralgias.     Objective:     Vital Signs (Most Recent):  Temp: 97.5 °F (36.4 °C) (10/15/19 0241)  Pulse: 66 (10/15/19 0241)  Resp: 18 (10/15/19 0241)  BP: (!) 116/58 (10/15/19 0241)  SpO2: 96 % (10/15/19 0241) Vital Signs (24h Range):  Temp:  [97.5 °F (36.4 °C)-99.1 °F (37.3 °C)] 97.5 °F (36.4 °C)  Pulse:  [] 66  Resp:  [18-20] 18  SpO2:  [95 %-98 %] 96 %  BP: (100-132)/(58-67) 116/58     Weight: 128.4 kg (283 lb 1.6 oz)  Body mass index is 38.4 kg/m².    Intake/Output Summary (Last 24 hours) at 10/15/2019 0727  Last data filed at 10/15/2019 0611  Gross per 24 hour   Intake 550 ml   Output 2400 ml   Net -1850 ml      Physical Exam   Constitutional: He is oriented to person, place, and time. He appears well-developed and well-nourished. No distress.   HENT:   Head: Normocephalic and atraumatic.   Right Ear: External ear normal.   Left Ear: External ear normal.   Nose: Nose normal.   Mouth/Throat: Oropharynx is clear and moist. No oropharyngeal exudate.   Eyes: Conjunctivae and EOM are normal. Right eye exhibits no discharge. Left eye exhibits no discharge. No scleral icterus.   Neck: Normal range of motion. Neck supple. No thyromegaly present.   Cardiovascular: Normal rate, regular rhythm, normal heart sounds and intact distal pulses. Exam reveals no gallop and no friction rub.   No murmur  heard.  Pulmonary/Chest: Effort normal and breath sounds normal. No respiratory distress. He has no wheezes.   Abdominal: Soft. Bowel sounds are normal. He exhibits no distension and no mass. There is no tenderness. There is no rebound and no guarding.   Genitourinary:   Genitourinary Comments: Cotter with red  urine -no noted clots    Musculoskeletal: Normal range of motion. He exhibits no edema or tenderness.   Lymphadenopathy:     He has no cervical adenopathy.   Neurological: He is alert and oriented to person, place, and time. No cranial nerve deficit. Coordination normal.   Skin: Skin is warm and dry. Capillary refill takes less than 2 seconds. No rash noted. No erythema.   Psychiatric: He has a normal mood and affect. His behavior is normal. Judgment and thought content normal.   Nursing note and vitals reviewed.      Significant Labs: All pertinent labs within the past 24 hours have been reviewed.    Significant Imaging: I have reviewed and interpreted all pertinent imaging results/findings within the past 24 hours.

## 2019-10-15 NOTE — H&P
[facility] Medicine  History & Physical    Patient Name: Mo Escobar  MRN: [id number]  Admission Date: 10/14/2019  Attending Physician: Sandy Mondragon MD   Primary Care Provider: Eleonora Mccabe NP         Patient information was obtained from patient, past medical records, Discussed with Dr Mcconnell  and ER records.     Subjective:     Principal Problem:Gross hematuria    Chief Complaint: No chief complaint on file.       HPI: Mr. Escobar is a 59 year old man with a past medical history of thrombophilia with PE and DVTs, hypothyroidism, anemia, and CAIT, bph, morbid obesity, chronic back pain who is 3 weeks s/p 3rd TURP who was directly admitted from DR Mcconnell office for noted gross hematuria. He had a barker catheter placed in ED the evening prior for urinary retention. A 3 way catheter was placed by Dr Mcconnell and bladder irrigation inititated in his office however the patient became sob and diaphoretic so sent for admission, pain control, eval of sob and continued CBI. He had been on lovenox with bridge to coumadin for his coagulopathy and INR in ED was 2.3. All anticoagulation will be held and his hematologist Dr Garrido notified.     At the time of my exam his urine was pink tinged and he had no further pain. He reports there was a clot irrigated and his symptoms resolved with that . He denied fever/chills, chest pain  Or n/v or dysuria.     Past Medical History:   Diagnosis Date    Acute deep vein thrombosis (DVT) of popliteal vein of right lower extremity 6/27/2017    Anemia due to chronic blood loss 10/3/2017    Anemia due to chronic blood loss 10/3/2017    Anticoagulant long-term use     Blood clotting disorder     Dr. Garrido    DVT (deep venous thrombosis)     Barker catheter in place     Heterozygous MTHFR mutation [id number] 6/27/2017    Heterozygous MTHFR mutation C677T 6/27/2017    Microcytic anemia 10/3/2017    Pulmonary emboli     Pulmonary embolism     2014  -  Due to clotting disorder    Sleep apnea     uses c-pap    Thyroid disease        Past Surgical History:   Procedure Laterality Date    BACK SURGERY  1989    CARDIAC CATHETERIZATION      2014    CYSTOSCOPY N/A 8/27/2019    Procedure: CYSTOSCOPY;  Surgeon: Mike Mcconnell MD;  Location: [facility] OR;  Service: Urology;  Laterality: N/A;    HAND TENDON SURGERY  1987    IRRIGATION OF BLADDER N/A 7/9/2019    Procedure: IRRIGATION, BLADDER;  Surgeon: Mike Mcconnell MD;  Location: [facility] OR;  Service: Urology;  Laterality: N/A;    TRANSURETHRAL RESECTION OF PROSTATE N/A 9/26/2019    Procedure: TURP (TRANSURETHRAL RESECTION OF PROSTATE);  Surgeon: Mike Mcconnell MD;  Location: [facility] OR;  Service: Urology;  Laterality: N/A;    TRANSURETHRAL RESECTION OF PROSTATE (TURP) USING THULIUM LASER N/A 9/26/2019    Procedure: TURP, USING THULIUM LASER / quanta laser;  Surgeon: Mike Mcconnell MD;  Location: [facility] OR;  Service: Urology;  Laterality: N/A;  QUANTA LASER; [id number] confirmed with Kelsie at [facility]/prostate laser 9/5 330pm -- estimated case time 9am -- WILL NEED TO CONFIRM/UPDATE TIME CLOSER TO       Review of patient's allergies indicates:  No Known Allergies    Current Facility-Administered Medications on File Prior to Encounter   Medication    lidocaine HCl 2% urojet     Current Outpatient Medications on File Prior to Encounter   Medication Sig    acetaminophen (TYLENOL) 650 MG TbSR Take 1,950 mg by mouth every 8 (eight) hours.    dextroamphetamine-amphetamine (ADDERALL) 30 mg Tab Take 30 mg by mouth once daily.    DULoxetine (CYMBALTA) 30 MG capsule Take 30 mg by mouth once daily.    enoxaparin (LOVENOX) 150 mg/mL Syrg Inject 130 mg into the skin every 12 (twelve) hours.     levothyroxine (SYNTHROID) 112 MCG tablet Take 112 mcg by mouth once daily.    lidocaine HCL 2% (XYLOCAINE) 2 % jelly Apply topically as needed. For barker irritation    warfarin (COUMADIN) 1 MG tablet TAKE 1 TO 4 TABLETS BY  MOUTH DAILY AS DIRECTED BY PHYSICIAN    warfarin (COUMADIN) 5 MG tablet TAKE ONE TABLET BY MOUTH ONCE DAILY    oxyCODONE-acetaminophen (PERCOCET) 5-325 mg per tablet Take 1 tablet by mouth every 6 (six) hours as needed (pain not relieved by otc agents). (Patient not taking: Reported on 10/14/2019)     Family History     Problem Relation (Age of Onset)    Cancer Mother    No Known Problems Father        Tobacco Use    Smoking status: Former Smoker     Packs/day: 1.00     Years: 7.00     Pack years: 7.00     Types: Cigarettes     Last attempt to quit:      Years since quittin.7    Smokeless tobacco: Never Used   Substance and Sexual Activity    Alcohol use: No    Drug use: No    Sexual activity: Yes     Partners: Female     Review of Systems   Constitutional: Negative for chills, fever and unexpected weight change.   HENT: Negative for congestion and sore throat.    Eyes: Negative for discharge and itching.   Respiratory: Positive for shortness of breath. Negative for cough.    Cardiovascular: Negative for chest pain and leg swelling.   Gastrointestinal: Negative for abdominal distention and abdominal pain.   Endocrine: Negative for cold intolerance and heat intolerance.   Genitourinary: Positive for difficulty urinating, dysuria and hematuria.   Musculoskeletal: Negative for arthralgias and back pain.   Skin: Negative for pallor and rash.   Allergic/Immunologic: Negative for environmental allergies and food allergies.   Neurological: Negative for dizziness and weakness.   Hematological: Negative for adenopathy. Does not bruise/bleed easily.   Psychiatric/Behavioral: Negative for agitation and decreased concentration.     Objective:     Vital Signs (Most Recent):  Temp: 97.5 °F (36.4 °C) (10/15/19 0241)  Pulse: 66 (10/15/19 0241)  Resp: 18 (10/15/19 0241)  BP: (!) 116/58 (10/15/19 0241)  SpO2: 96 % (10/15/19 0241) Vital Signs (24h Range):  Temp:  [97.5 °F (36.4 °C)-99.1 °F (37.3 °C)] 97.5 °F (36.4  °C)  Pulse:  [] 66  Resp:  [18-20] 18  SpO2:  [95 %-98 %] 96 %  BP: (100-132)/(58-67) 116/58     Weight: 128.4 kg (283 lb 1.6 oz)  Body mass index is 38.4 kg/m².    Physical Exam   Constitutional: He is oriented to person, place, and time. He appears well-developed and well-nourished. He appears distressed.   HENT:   Head: Normocephalic and atraumatic.   Right Ear: External ear normal.   Left Ear: External ear normal.   Nose: Nose normal.   Mouth/Throat: Oropharynx is clear and moist. No oropharyngeal exudate.   Eyes: Conjunctivae and EOM are normal. Right eye exhibits no discharge. Left eye exhibits no discharge. No scleral icterus.   Neck: Normal range of motion. Neck supple. No thyromegaly present.   Cardiovascular: Normal rate, regular rhythm, normal heart sounds and intact distal pulses. Exam reveals no gallop and no friction rub.   No murmur heard.  Pulmonary/Chest: Effort normal. No respiratory distress. He has no wheezes.   Abdominal: Soft. He exhibits no distension and no mass. There is no tenderness. There is no rebound and no guarding.   Genitourinary:   Genitourinary Comments: Cotter with pink-tinged urine    Musculoskeletal: Normal range of motion. He exhibits no edema or tenderness.   Lymphadenopathy:     He has no cervical adenopathy.   Neurological: He is alert and oriented to person, place, and time. No cranial nerve deficit. Coordination normal.   Skin: Skin is warm and dry. No rash noted. No erythema.   Psychiatric: He has a normal mood and affect. His behavior is normal. Judgment and thought content normal.   Nursing note and vitals reviewed.        CRANIAL NERVES     CN III, IV, VI   Extraocular motions are normal.        Significant Labs:   CBC:   Recent Labs   Lab 10/14/19  1721   WBC 7.99   HGB 9.7*   HCT 29.8*        CMP:   Recent Labs   Lab 10/14/19  1721      K 4.5      CO2 23   *   BUN 11   CREATININE 0.8   CALCIUM 9.4   PROT 6.5   ALBUMIN 3.7   BILITOT 0.3    ALKPHOS 47*   AST 26   ALT 45*   ANIONGAP 10   EGFRNONAA >60     Urine Studies:   Recent Labs   Lab 10/13/19  2359   COLORU Red*   APPEARANCEUA Cloudy*   PHUR SEE COMMENT   SPECGRAV SEE COMMENT   PROTEINUA SEE COMMENT   GLUCUA SEE COMMENT   KETONESU SEE COMMENT   BILIRUBINUA SEE COMMENT   OCCULTUA SEE COMMENT   NITRITE SEE COMMENT   UROBILINOGEN SEE COMMENT   LEUKOCYTESUR SEE COMMENT   RBCUA >100*   WBCUA 15*   BACTERIA Occasional   SQUAMEPITHEL 0       Significant Imaging: I have reviewed all pertinent imaging results/findings within the past 24 hours.    Assessment/Plan:     * Gross hematuria  Following with urology-3 way/CBI and trending h/h  Bladder us as follows:  Findings suggesting organized hematoma in the bladder around the catheter measuring approximately 3.5 cm.    Anticoagulation on hold -consider cystoscopy per urology   Empiric rocephin   Urine culture obtained in ED 10/13-will follow       Chronic anticoagulation  Chronic 2/2 coagulopathy-was on lovenox with bridge to coumadin. -currently both on hold 2/2 gross hematuria post op turp.   Trending INR       Acute blood loss anemia  Acute 2/2 hematuria  Monitor and transfuse if he becomes hemodynamically unstable or H/H < 7/21  Anticoagulation on hold /hematology consulted -see chronic anticoagulation      BMI 40.0-44.9, adult  Morbid obesity complicates all aspects of disease management from diagnostic modalities to treatment. Weight loss encouraged and health benefits explained to patient.        Heterozygous MTHFR mutation [id number]  See chronic anticoag   CTA chest -neg for Acute PE       Chronic bilateral low back pain with sciatica  Chronic stable, continue neuropathic meds/pain control       Obstructive sleep apnea syndrome  Chronic, stable, continue cpap /oxygen         VTE Risk Mitigation (From admission, onward)         Ordered     Place LOREN hose  Until discontinued      10/14/19 1529     Place sequential compression device  Until discontinued       10/14/19 1529     IP VTE HIGH RISK PATIENT  Once      10/14/19 1529                   Sandy Mondragon MD  Department of Hospital Medicine   [facility]

## 2019-10-15 NOTE — ASSESSMENT & PLAN NOTE
Following with urology-3 way/CBI and trending h/h  Bladder us as follows:  Findings suggesting organized hematoma in the bladder around the catheter measuring approximately 3.5 cm.    Anticoagulation on hold -consider cystoscopy per urology   Empiric rocephin   Urine culture obtained in ED 10/13-will follow

## 2019-10-15 NOTE — PROGRESS NOTES
Urology Progress Note:    Pt up in chair with CBI clamped and urine koolaid/fruit punch clear red draining well  Mild occ bladder spasms  Pain largely relieved sitting up  CT PE protocol negative  H/H on admit 9.7/29.8.   Bloodthinners held. INR still 2.1 on admit  US bladder with likely 3cm clot near balloon  After US yesterday large piece of clot aspirated  CBI wide after that and CBI requirement decreased overnight to almost clamped  After clamping and up to chair, more red as above    Manually irrigated barker with 60 cc cath tip syringe and sterile water.  Irrigated clear pink to clear. Mild resistance still near bladder neck, but with lubrication/manipulation of barker in/out bladder and keeping 120cc working in bladder, irrigated to clear. No clot aspirated.    Keep barker clamped this morning, and ambulate with clamping trial to observe urine. If fruit punch or clearer after ambulating, ok to resume diet.  Will observe clamped and ambulating, and preferentially use manual irrigation to clear to see how he does off of CBI.  Would observe today and consider keeping in house until safe to resume bloodthinners and monitor once full dose lovenox resumed with barker in place.   Would continue to hold bloodthinners today until sees how he tolerates clamping trial

## 2019-10-15 NOTE — NURSING
Pt has been sitting up in chair all morning, he has ambulated in [place], he is currently standing at bedside brushing his teeth, no changes are noted to urine color if anything it is getting slightly lighter in color but is still a dark red color like this morning.  He denies pain.

## 2019-10-15 NOTE — SUBJECTIVE & OBJECTIVE
Past Medical History:   Diagnosis Date    Acute deep vein thrombosis (DVT) of popliteal vein of right lower extremity 6/27/2017    Anemia due to chronic blood loss 10/3/2017    Anemia due to chronic blood loss 10/3/2017    Anticoagulant long-term use     Blood clotting disorder     Dr. Garrido    DVT (deep venous thrombosis)     Cotter catheter in place     Heterozygous MTHFR mutation [id number] 6/27/2017    Heterozygous MTHFR mutation C677T 6/27/2017    Microcytic anemia 10/3/2017    Pulmonary emboli     Pulmonary embolism     2014  - Due to clotting disorder    Sleep apnea     uses c-pap    Thyroid disease        Past Surgical History:   Procedure Laterality Date    BACK SURGERY  1989    CARDIAC CATHETERIZATION      2014    CYSTOSCOPY N/A 8/27/2019    Procedure: CYSTOSCOPY;  Surgeon: Mike Mcconnell MD;  Location: [facility] OR;  Service: Urology;  Laterality: N/A;    HAND TENDON SURGERY  1987    IRRIGATION OF BLADDER N/A 7/9/2019    Procedure: IRRIGATION, BLADDER;  Surgeon: Mike Mcconnell MD;  Location: [facility] OR;  Service: Urology;  Laterality: N/A;    TRANSURETHRAL RESECTION OF PROSTATE N/A 9/26/2019    Procedure: TURP (TRANSURETHRAL RESECTION OF PROSTATE);  Surgeon: Mike Mcconnell MD;  Location: [facility] OR;  Service: Urology;  Laterality: N/A;    TRANSURETHRAL RESECTION OF PROSTATE (TURP) USING THULIUM LASER N/A 9/26/2019    Procedure: TURP, USING THULIUM LASER / quanta laser;  Surgeon: Mike Mcconnell MD;  Location: [facility] OR;  Service: Urology;  Laterality: N/A;  QUANTA LASER; [id number] confirmed with Kelsie at [facility]/prostate laser 9/5 330pm -- estimated case time 9am -- WILL NEED TO CONFIRM/UPDATE TIME CLOSER TO       Review of patient's allergies indicates:  No Known Allergies    Current Facility-Administered Medications on File Prior to Encounter   Medication    lidocaine HCl 2% urojet     Current Outpatient Medications on File Prior to Encounter   Medication Sig    acetaminophen  (TYLENOL) 650 MG TbSR Take 1,950 mg by mouth every 8 (eight) hours.    dextroamphetamine-amphetamine (ADDERALL) 30 mg Tab Take 30 mg by mouth once daily.    DULoxetine (CYMBALTA) 30 MG capsule Take 30 mg by mouth once daily.    enoxaparin (LOVENOX) 150 mg/mL Syrg Inject 130 mg into the skin every 12 (twelve) hours.     levothyroxine (SYNTHROID) 112 MCG tablet Take 112 mcg by mouth once daily.    lidocaine HCL 2% (XYLOCAINE) 2 % jelly Apply topically as needed. For barker irritation    warfarin (COUMADIN) 1 MG tablet TAKE 1 TO 4 TABLETS BY MOUTH DAILY AS DIRECTED BY PHYSICIAN    warfarin (COUMADIN) 5 MG tablet TAKE ONE TABLET BY MOUTH ONCE DAILY    oxyCODONE-acetaminophen (PERCOCET) 5-325 mg per tablet Take 1 tablet by mouth every 6 (six) hours as needed (pain not relieved by otc agents). (Patient not taking: Reported on 10/14/2019)     Family History     Problem Relation (Age of Onset)    Cancer Mother    No Known Problems Father        Tobacco Use    Smoking status: Former Smoker     Packs/day: 1.00     Years: 7.00     Pack years: 7.00     Types: Cigarettes     Last attempt to quit: 2012     Years since quittin.7    Smokeless tobacco: Never Used   Substance and Sexual Activity    Alcohol use: No    Drug use: No    Sexual activity: Yes     Partners: Female     Review of Systems   Constitutional: Negative for chills, fever and unexpected weight change.   HENT: Negative for congestion and sore throat.    Eyes: Negative for discharge and itching.   Respiratory: Positive for shortness of breath. Negative for cough.    Cardiovascular: Negative for chest pain and leg swelling.   Gastrointestinal: Negative for abdominal distention and abdominal pain.   Endocrine: Negative for cold intolerance and heat intolerance.   Genitourinary: Positive for difficulty urinating, dysuria and hematuria.   Musculoskeletal: Negative for arthralgias and back pain.   Skin: Negative for pallor and rash.   Allergic/Immunologic:  Negative for environmental allergies and food allergies.   Neurological: Negative for dizziness and weakness.   Hematological: Negative for adenopathy. Does not bruise/bleed easily.   Psychiatric/Behavioral: Negative for agitation and decreased concentration.     Objective:     Vital Signs (Most Recent):  Temp: 97.5 °F (36.4 °C) (10/15/19 0241)  Pulse: 66 (10/15/19 0241)  Resp: 18 (10/15/19 0241)  BP: (!) 116/58 (10/15/19 0241)  SpO2: 96 % (10/15/19 0241) Vital Signs (24h Range):  Temp:  [97.5 °F (36.4 °C)-99.1 °F (37.3 °C)] 97.5 °F (36.4 °C)  Pulse:  [] 66  Resp:  [18-20] 18  SpO2:  [95 %-98 %] 96 %  BP: (100-132)/(58-67) 116/58     Weight: 128.4 kg (283 lb 1.6 oz)  Body mass index is 38.4 kg/m².    Physical Exam   Constitutional: He is oriented to person, place, and time. He appears well-developed and well-nourished. He appears distressed.   HENT:   Head: Normocephalic and atraumatic.   Right Ear: External ear normal.   Left Ear: External ear normal.   Nose: Nose normal.   Mouth/Throat: Oropharynx is clear and moist. No oropharyngeal exudate.   Eyes: Conjunctivae and EOM are normal. Right eye exhibits no discharge. Left eye exhibits no discharge. No scleral icterus.   Neck: Normal range of motion. Neck supple. No thyromegaly present.   Cardiovascular: Normal rate, regular rhythm, normal heart sounds and intact distal pulses. Exam reveals no gallop and no friction rub.   No murmur heard.  Pulmonary/Chest: Effort normal. No respiratory distress. He has no wheezes.   Abdominal: Soft. He exhibits no distension and no mass. There is no tenderness. There is no rebound and no guarding.   Genitourinary:   Genitourinary Comments: Cotter with pink-tinged urine    Musculoskeletal: Normal range of motion. He exhibits no edema or tenderness.   Lymphadenopathy:     He has no cervical adenopathy.   Neurological: He is alert and oriented to person, place, and time. No cranial nerve deficit. Coordination normal.   Skin: Skin  is warm and dry. No rash noted. No erythema.   Psychiatric: He has a normal mood and affect. His behavior is normal. Judgment and thought content normal.   Nursing note and vitals reviewed.        CRANIAL NERVES     CN III, IV, VI   Extraocular motions are normal.        Significant Labs:   CBC:   Recent Labs   Lab 10/14/19  1721   WBC 7.99   HGB 9.7*   HCT 29.8*        CMP:   Recent Labs   Lab 10/14/19  1721      K 4.5      CO2 23   *   BUN 11   CREATININE 0.8   CALCIUM 9.4   PROT 6.5   ALBUMIN 3.7   BILITOT 0.3   ALKPHOS 47*   AST 26   ALT 45*   ANIONGAP 10   EGFRNONAA >60     Urine Studies:   Recent Labs   Lab 10/13/19  2359   COLORU Red*   APPEARANCEUA Cloudy*   PHUR SEE COMMENT   SPECGRAV SEE COMMENT   PROTEINUA SEE COMMENT   GLUCUA SEE COMMENT   KETONESU SEE COMMENT   BILIRUBINUA SEE COMMENT   OCCULTUA SEE COMMENT   NITRITE SEE COMMENT   UROBILINOGEN SEE COMMENT   LEUKOCYTESUR SEE COMMENT   RBCUA >100*   WBCUA 15*   BACTERIA Occasional   SQUAMEPITHEL 0       Significant Imaging: I have reviewed all pertinent imaging results/findings within the past 24 hours.

## 2019-10-15 NOTE — ASSESSMENT & PLAN NOTE
Acute 2/2 hematuria  Monitor and transfuse if he becomes hemodynamically unstable or H/H < 7/21  Anticoagulation on hold /hematology consulted -see chronic anticoagulation

## 2019-10-15 NOTE — HPI
Mr. Escobar is a 59 year old man with a past medical history of thrombophilia with PE and DVTs, hypothyroidism, anemia, and CAIT, bph, morbid obesity, chronic back pain who is 3 weeks s/p 3rd TURP who was directly admitted from DR Mcconnell office for noted gross hematuria. He had a barker catheter placed in ED the evening prior for urinary retention. A 3 way catheter was placed by Dr Mcconnell and bladder irrigation inititated in his office however the patient became sob and diaphoretic so sent for admission, pain control, eval of sob and continued CBI. He had been on lovenox with bridge to coumadin for his coagulopathy and INR in ED was 2.3. All anticoagulation will be held and his hematologist Dr Garrido notified.     At the time of my exam his urine was pink tinged and he had no further pain. He reports there was a clot irrigated and his symptoms resolved with that . He denied fever/chills, chest pain  Or n/v or dysuria.

## 2019-10-15 NOTE — ASSESSMENT & PLAN NOTE
Chronic 2/2 coagulopathy-was on lovenox with bridge to coumadin. -currently both on hold 2/2 gross hematuria post op turp.   Trending INR

## 2019-10-15 NOTE — NURSING
Pt's CBI was clamped after speaking with Dr. Mcconnell about pt's night with pt's urine draining freely without clots or complication at a moderate to low rate as of this a.m.  Pt's main complaint is his back pain 8/10 unrelieved by his PRN medication.  The pt was updated on the plan to clamp his catheter this morning and was then assisted OOB to the bedside chair.  Pt reported some immediate difference in his back pain stating he was much more comfortable sitting up.  PRN Tylenol was given, a heat pack and pillow were placed behind the pt's back and his bed linens were changed at this time.  Pt's PIV remains in place and patent with IVF NS infusing as ordered.  Call bell given to the pt and he was instructed to call for assistance prior to getting out of the chair and verbalized an understanding.

## 2019-10-15 NOTE — NURSING
Pt ambulated in [place], no changes noted to urine in tube during ambulation.  He denies any increase or changes in pain during ambulation.  Dr. Mcconnell stated during his rounds this morning that if the urine stayed a Agustin-aid color or less after he ambulated he could have breakfast, I did not note any changes in the urine, no clots noted and it is draining well.  Pt denies pain after ambulation as well.

## 2019-10-15 NOTE — PLAN OF CARE
10/15/19 0738   PRE-TX-O2   O2 Device (Oxygen Therapy) room air   SpO2 95 %   Pulse Oximetry Type Intermittent   $ Pulse Oximetry - Multiple Charge Pulse Oximetry - Multiple   Pulse 65   Resp 18

## 2019-10-15 NOTE — CONSULTS
[facility]   Hematology/Oncology  Inpatient Consult Note          Patient Name: Mo Escobar  MRN: [id number]  Admission Date: 10/14/2019  Hospital Length of Stay: 0 days  Code Status: Full Code   Attending Provider: Sandy Mondragon MD  Referring Provider: Mike Mcconnell MD  Consulting Provider: Dion Garrido MD  Primary Care Physician: Eleonora Mccabe NP  Principal Problem:Gross hematuria    Consults  Subjective:     Chief Complaint: hematuria        History Present Illness:  59 y.o. male known to my hematology service with diagnosis of chronic clot disorder with prior bilateral pulmonary emboli with underlying MTHFR-A and MTHFR-C heterozygous condition. He has been on coumadin long term per direction of Dr Best with cardiology. He was admitted to [facility] from urology clinic with hematuria. His coumadin is currently on hold and urology is planning a repeat cystoscope once the INR is lower. He denies any current CP, SOB, HA's or N/V. The hematuria has resolved. I discussed with Dr Mondragon in person today.         Past Medical/Surgical History:  Past Medical History:   Diagnosis Date    Acute deep vein thrombosis (DVT) of popliteal vein of right lower extremity 6/27/2017    Anemia due to chronic blood loss 10/3/2017    Anemia due to chronic blood loss 10/3/2017    Anticoagulant long-term use     Blood clotting disorder     Dr. Garrido    DVT (deep venous thrombosis)     Cotter catheter in place     Heterozygous MTHFR mutation [id number] 6/27/2017    Heterozygous MTHFR mutation [id number] 6/27/2017    Microcytic anemia 10/3/2017    Pulmonary emboli     Pulmonary embolism     2014  - Due to clotting disorder    Sleep apnea     uses c-pap    Thyroid disease      Past Surgical History:   Procedure Laterality Date    BACK SURGERY  1989    CARDIAC CATHETERIZATION      2014    CYSTOSCOPY N/A 8/27/2019    Procedure: CYSTOSCOPY;  Surgeon: Mike Mcconnell MD;  Location: [facility] OR;  Service:  Urology;  Laterality: N/A;    HAND TENDON SURGERY  1987    IRRIGATION OF BLADDER N/A 2019    Procedure: IRRIGATION, BLADDER;  Surgeon: Mike Mcconnell MD;  Location: [facility] OR;  Service: Urology;  Laterality: N/A;    TRANSURETHRAL RESECTION OF PROSTATE N/A 2019    Procedure: TURP (TRANSURETHRAL RESECTION OF PROSTATE);  Surgeon: Mkie Mcconnell MD;  Location: [facility] OR;  Service: Urology;  Laterality: N/A;    TRANSURETHRAL RESECTION OF PROSTATE (TURP) USING THULIUM LASER N/A 2019    Procedure: TURP, USING THULIUM LASER / quanta laser;  Surgeon: Mike Mcconnell MD;  Location: [facility] OR;  Service: Urology;  Laterality: N/A;  QUANTA LASER; [id number] confirmed with Kelsie at [facility]/prostate laser  330pm -- estimated case time 9am -- WILL NEED TO CONFIRM/UPDATE TIME CLOSER TO         Allergies:  Review of patient's allergies indicates:  No Known Allergies    Social/Family History:  Social History     Socioeconomic History    Marital status:      Spouse name: Not on file    Number of children: Not on file    Years of education: Not on file    Highest education level: Not on file   Occupational History    Not on file   Social Needs    Financial resource strain: Not on file    Food insecurity:     Worry: Not on file     Inability: Not on file    Transportation needs:     Medical: Not on file     Non-medical: Not on file   Tobacco Use    Smoking status: Former Smoker     Packs/day: 1.00     Years: 7.00     Pack years: 7.00     Types: Cigarettes     Last attempt to quit:      Years since quittin.7    Smokeless tobacco: Never Used   Substance and Sexual Activity    Alcohol use: No    Drug use: No    Sexual activity: Yes     Partners: Female   Lifestyle    Physical activity:     Days per week: Not on file     Minutes per session: Not on file    Stress: Not on file   Relationships    Social connections:     Talks on phone: Not on file     Gets together: Not on file      Attends Christian service: Not on file     Active member of club or organization: Not on file     Attends meetings of clubs or organizations: Not on file     Relationship status: Not on file   Other Topics Concern    Not on file   Social History Narrative    Not on file     Family History   Problem Relation Age of Onset    Cancer Mother     No Known Problems Father          ROS:    GEN: normal without any fever, night sweats or weight loss  HEENT: normal with no HA's, sore throat, stiff neck, changes in vision  CV: normal with no CP, SOB, PND, HERRERA or orthopnea  PULM: normal with no SOB, cough, hemoptysis, sputum or pleuritic pain  GI: normal with no abdominal pain, nausea, vomiting, constipation, diarrhea, melanotic stools, BRBPR, or hematemesis  : hematuria  BREAST: normal with no mass, discharge, pain  SKIN: normal with no rash, erythema, bruising, or swelling        Medications:  Continuous Infusions:   sodium chloride 0.9% 125 mL/hr at 10/14/19 1622     Scheduled Meds:   cefTRIAXone (ROCEPHIN) IVPB  1 g Intravenous Q24H    DULoxetine  30 mg Oral Daily    levothyroxine  112 mcg Oral Daily    senna-docusate 8.6-50 mg  1 tablet Oral BID    sodium chloride 0.9%  3 mL Intravenous Q6H     PRN Meds:acetaminophen, dextrose 50%, dextrose 50%, glucagon (human recombinant), glucose, glucose, HYDROmorphone, lidocaine HCL 2%, ondansetron, promethazine (PHENERGAN) IVPB         Objective:       Physical Exam:    Vitals:  Blood pressure (!) 111/51, pulse 65, temperature 97.1 °F (36.2 °C), resp. rate 18, height 6' (1.829 m), weight 128.4 kg (283 lb 1.6 oz), SpO2 95 %.    GEN: no apparent distress, comfortable; AAOx3; overweight  HEAD: atraumatic and normocephalic  EYES: no pallor, no icterus, PERRLA  ENT: OMM, no pharyngeal erythema, external ears WNL; no nasal discharge; no thrush  NECK: no masses, thyroid normal, trachea midline, no LAD/LN's, supple  CV: RRR with no murmur; normal pulse; normal S1 and S2; no pedal  edema  CHEST: Normal respiratory effort; CTAB; normal breath sounds; no wheeze or crackles  ABDOM: nontender and nondistended; soft; normal bowel sounds; no rebound/guarding  MUSC/Skeletal: ROM normal; no crepitus; joints normal; no deformities or arthropathy  EXTREM: no clubbing, cyanosis, inflammation or swelling  SKIN: no rashes, lesions, ulcers, petechiae or subcutaneous nodules  : barker  NEURO: grossly intact; motor/sensory WNL; AAOx3; no tremors  PSYCH: normal mood, affect and behavior  LYMPH: normal cervical, supraclavicular, axillary and groin LN's      Lab Review:   Lab Results   Component Value Date    WBC 6.27 10/15/2019    HGB 8.7 (L) 10/15/2019    HCT 27.3 (L) 10/15/2019    MCV 84 10/15/2019     10/15/2019     CMP  Sodium   Date Value Ref Range Status   10/15/2019 137 136 - 145 mmol/L Final   10/15/2019 137 136 - 145 mmol/L Final   06/27/2017 137 134 - 144 mmol/L      Potassium   Date Value Ref Range Status   10/15/2019 3.8 3.5 - 5.1 mmol/L Final   10/15/2019 3.8 3.5 - 5.1 mmol/L Final     Chloride   Date Value Ref Range Status   10/15/2019 107 95 - 110 mmol/L Final   10/15/2019 107 95 - 110 mmol/L Final   06/27/2017 100 98 - 110 mmol/L      CO2   Date Value Ref Range Status   10/15/2019 22 (L) 23 - 29 mmol/L Final   10/15/2019 22 (L) 23 - 29 mmol/L Final     Glucose   Date Value Ref Range Status   10/15/2019 158 (H) 70 - 110 mg/dL Final   10/15/2019 158 (H) 70 - 110 mg/dL Final   06/27/2017 128 (H) 70 - 99 mg/dL      BUN, Bld   Date Value Ref Range Status   10/15/2019 10 6 - 20 mg/dL Final   10/15/2019 10 6 - 20 mg/dL Final     Creatinine   Date Value Ref Range Status   10/15/2019 0.8 0.5 - 1.4 mg/dL Final   10/15/2019 0.8 0.5 - 1.4 mg/dL Final   06/27/2017 1.15 0.60 - 1.40 mg/dL      Calcium   Date Value Ref Range Status   10/15/2019 8.7 8.7 - 10.5 mg/dL Final   10/15/2019 8.7 8.7 - 10.5 mg/dL Final     Total Protein   Date Value Ref Range Status   10/14/2019 6.5 6.0 - 8.4 g/dL Final      Albumin   Date Value Ref Range Status   10/15/2019 3.4 (L) 3.5 - 5.2 g/dL Final   06/27/2017 4.2 3.1 - 4.7 g/dL      Total Bilirubin   Date Value Ref Range Status   10/14/2019 0.3 0.1 - 1.0 mg/dL Final     Comment:     For infants and newborns, interpretation of results should be based  on gestational age, weight and in agreement with clinical  observations.  Premature Infant recommended reference ranges:  Up to 24 hours.............<8.0 mg/dL  Up to 48 hours............<12.0 mg/dL  3-5 days..................<15.0 mg/dL  6-29 days.................<15.0 mg/dL       Alkaline Phosphatase   Date Value Ref Range Status   10/14/2019 47 (L) 55 - 135 U/L Final     AST   Date Value Ref Range Status   10/14/2019 26 10 - 40 U/L Final     ALT   Date Value Ref Range Status   10/14/2019 45 (H) 10 - 44 U/L Final     Anion Gap   Date Value Ref Range Status   10/15/2019 8 8 - 16 mmol/L Final   10/15/2019 8 8 - 16 mmol/L Final     eGFR if    Date Value Ref Range Status   10/15/2019 >60 >60 mL/min/1.73 m^2 Final   10/15/2019 >60 >60 mL/min/1.73 m^2 Final     eGFR if non    Date Value Ref Range Status   10/15/2019 >60 >60 mL/min/1.73 m^2 Final     Comment:     Calculation used to obtain the estimated glomerular filtration  rate (eGFR) is the CKD-EPI equation.      10/15/2019 >60 >60 mL/min/1.73 m^2 Final     Comment:     Calculation used to obtain the estimated glomerular filtration  rate (eGFR) is the CKD-EPI equation.            Diagnostic Results:  CTA Chest Non Coronary [[id number]] Resulted: 10/14/19 1623   Order Status: Completed Updated: 10/14/19 1625   Narrative:     EXAMINATION:  CTA CHEST NON CORONARY    CLINICAL HISTORY:  sob, clotting disorder;    TECHNIQUE:  Low dose axial images, sagittal and coronal reformations were obtained from the thoracic inlet to the lung bases following the IV administration of 100 mL of Omnipaque 350.  Contrast timing was optimized to evaluate the pulmonary  arteries.  MIP images were performed.    COMPARISON:  10/8/2018    FINDINGS:  There is good pulmonary artery enhancement.  There are no intraluminal filling defects in pulmonary artery suggesting pulmonary artery embolism.  There is somewhat limited evaluation particular for small peripheral intraluminal filling defects with beam hardening.    There is dependent hypoventilatory change and there is slightly greater opacifications somewhat mosaic appearance of the lung bases which could reflect hypoventilatory change but suggest mild basilar infiltrate.    There are no acute findings in the visualized structures base of the neck    There is no significant hilar or mediastinal adenopathy    There is no pleural or pericardial effusion.    No acute findings in the visualized upper abdomen   Impression:       No intraluminal filling defects in pulmonary artery suggesting pulmonary artery embolism    Hypoventilatory changes bilaterally more so on the basis with the appearance suggest mild mosaic appearance of the chest and mild basilar infiltrate.  This is new compared to the prior exam.     US Bladder [[id number]] Resulted: 10/14/19 1657   Order Status: Completed Updated: 10/14/19 1700   Narrative:     EXAMINATION:  US BLADDER    CLINICAL HISTORY:  clot retention, improved on cbi - evaluate for organized clot near bladder neck/barker balloon;  Other retention of urine    TECHNIQUE:  Transabdominal imaging of the bladder    FINDINGS:  Bladder is measured at 12.8 x 8.8 x 8.8 cm for a volume of 516 cc.  There is a Barker catheter in the bladder.  There is heterogeneous ill-defined, irregular, approximately 3.5 cm hypoechoic area around the suggesting hematoma around the catheter with differential including mass.  Low level echoes elsewhere suggest small amount of scattered debris in the bladder.   Impression:       Findings suggesting organized hematoma in the bladder around the catheter measuring approximately 3.5 cm.          Assessment/Plan:     IMPRESSION:  (1) 59 y.o. male known to my hematology service with diagnosis of chronic clot disorder with prior bilateral pulmonary emboli  - he has underlying clot disorder with MTHFR-A and MTHFR-C heterozygous condition  - he has been on coumadin long term per direction of Dr Best with cardiology  - latest INR was 1.6        (2) Recurrent hematuria issues - he has been under the care of Dr Mcconnell with   - prior cyctoscope and TURP with Dr Mcconnell; everything looked good per patient  - he has plans to see Dr Eduardo in [place] about his prostate in the near future     (3) Hx/of Recurrent clot event with prior DVT in the right popliteal vein  - he has been on coumadin per direction of Dr Best     (4) Pulmonary HTN hx - followed by Dr Robles with Pulmonary     (5) Hx/of gout issues     (6) Anemia - chronic with acute exacerbation with blood loss from hematuria  - microcytic indices  - he has been on oral iron   - hgb currently at 8.7        Active Diagnoses:    Diagnosis Date Noted POA    PRINCIPAL PROBLEM:  Gross hematuria [R31.0] 06/13/2017 Yes    Chronic anticoagulation [Z79.01] 10/15/2019 Not Applicable    Acute blood loss anemia [D62] 10/14/2019 Yes    BMI 40.0-44.9, adult [Z68.41] 09/15/2017 Not Applicable    Heterozygous MTHFR mutation [id number] [E72.12] 06/27/2017 Yes    Chronic bilateral low back pain with sciatica [M54.40, G89.29] 12/13/2016 Yes    Chronic diastolic congestive heart failure [I50.32] 12/13/2016 Yes    Pulmonary hypertension due to left ventricular diastolic dysfunction [I27.22] 12/13/2016 Yes    Chronic diastolic heart failure [I50.32] 11/17/2016 Yes    Obstructive sleep apnea syndrome [G47.33] 11/16/2016 Yes    Neuropathic pain [M79.2] 11/16/2016 Yes    Morbid obesity [E66.01] 11/16/2016 Yes      Problems Resolved During this Admission:           PLAN:   1. Recommend holding coumadin and consider placing him on lovenox until the hematuria resolves  or is under control and after the cystocope  2. Monitor labs and transfuse as needed  3. Check iron panel with next labs  4. Urology as per  directives - discussed with Dr Mcconnell and he plans to do cystoscope on Thursday am and will resume lovenox later that afternoon  5. Will follow with you        Thank you for your consult.      Dion Garrido MD  Hematology/Oncology  [facility]

## 2019-10-15 NOTE — PLAN OF CARE
Pt has remained free from injury this shift, he is ambulatory with stand by assist.  He has been medicated for pain this shift with prn medication.  No elevated temp this shift and lab is assessed daily for changes.  He is eating and drinking well.  Bladder irrigation as needed.  He was encouraged to call for assistance with return demonstration on use of call light.

## 2019-10-16 ENCOUNTER — ANESTHESIA EVENT (OUTPATIENT)
Dept: SURGERY | Facility: HOSPITAL | Age: 59
DRG: 699 | End: 2019-10-16
Payer: MEDICARE

## 2019-10-16 LAB
ABO + RH BLD: NORMAL
ALBUMIN SERPL BCP-MCNC: 2.9 G/DL (ref 3.5–5.2)
ANION GAP SERPL CALC-SCNC: 6 MMOL/L (ref 8–16)
ANION GAP SERPL CALC-SCNC: 6 MMOL/L (ref 8–16)
BACTERIA #/AREA URNS HPF: ABNORMAL /HPF
BASOPHILS # BLD AUTO: 0.02 K/UL (ref 0–0.2)
BASOPHILS NFR BLD: 0.5 % (ref 0–1.9)
BILIRUB UR QL STRIP: ABNORMAL
BLD GP AB SCN CELLS X3 SERPL QL: NORMAL
BLD PROD TYP BPU: NORMAL
BLD PROD TYP BPU: NORMAL
BLOOD UNIT EXPIRATION DATE: NORMAL
BLOOD UNIT EXPIRATION DATE: NORMAL
BLOOD UNIT TYPE CODE: 6200
BLOOD UNIT TYPE CODE: 6200
BLOOD UNIT TYPE: NORMAL
BLOOD UNIT TYPE: NORMAL
BUN SERPL-MCNC: 8 MG/DL (ref 6–20)
BUN SERPL-MCNC: 8 MG/DL (ref 6–20)
CALCIUM SERPL-MCNC: 8 MG/DL (ref 8.7–10.5)
CALCIUM SERPL-MCNC: 8 MG/DL (ref 8.7–10.5)
CHLORIDE SERPL-SCNC: 110 MMOL/L (ref 95–110)
CHLORIDE SERPL-SCNC: 110 MMOL/L (ref 95–110)
CLARITY UR: ABNORMAL
CO2 SERPL-SCNC: 26 MMOL/L (ref 23–29)
CO2 SERPL-SCNC: 26 MMOL/L (ref 23–29)
CODING SYSTEM: NORMAL
CODING SYSTEM: NORMAL
COLOR UR: ABNORMAL
CREAT SERPL-MCNC: 0.8 MG/DL (ref 0.5–1.4)
CREAT SERPL-MCNC: 0.8 MG/DL (ref 0.5–1.4)
DIFFERENTIAL METHOD: ABNORMAL
DISPENSE STATUS: NORMAL
DISPENSE STATUS: NORMAL
EOSINOPHIL # BLD AUTO: 0.2 K/UL (ref 0–0.5)
EOSINOPHIL NFR BLD: 3.9 % (ref 0–8)
ERYTHROCYTE [DISTWIDTH] IN BLOOD BY AUTOMATED COUNT: 14 % (ref 11.5–14.5)
EST. GFR  (AFRICAN AMERICAN): >60 ML/MIN/1.73 M^2
EST. GFR  (AFRICAN AMERICAN): >60 ML/MIN/1.73 M^2
EST. GFR  (NON AFRICAN AMERICAN): >60 ML/MIN/1.73 M^2
EST. GFR  (NON AFRICAN AMERICAN): >60 ML/MIN/1.73 M^2
GLUCOSE SERPL-MCNC: 108 MG/DL (ref 70–110)
GLUCOSE SERPL-MCNC: 108 MG/DL (ref 70–110)
GLUCOSE UR QL STRIP: ABNORMAL
HCT VFR BLD AUTO: 22.8 % (ref 40–54)
HGB BLD-MCNC: 7.2 G/DL (ref 14–18)
HGB UR QL STRIP: ABNORMAL
HYALINE CASTS #/AREA URNS LPF: 0 /LPF
IMM GRANULOCYTES # BLD AUTO: 0.05 K/UL (ref 0–0.04)
INR PPP: 1.2 (ref 0.8–1.2)
KETONES UR QL STRIP: ABNORMAL
LEUKOCYTE ESTERASE UR QL STRIP: ABNORMAL
LYMPHOCYTES # BLD AUTO: 1.6 K/UL (ref 1–4.8)
LYMPHOCYTES NFR BLD: 37.2 % (ref 18–48)
MCH RBC QN AUTO: 26.9 PG (ref 27–31)
MCHC RBC AUTO-ENTMCNC: 31.6 G/DL (ref 32–36)
MCV RBC AUTO: 85 FL (ref 82–98)
MICROSCOPIC COMMENT: ABNORMAL
MONOCYTES # BLD AUTO: 0.3 K/UL (ref 0.3–1)
MONOCYTES NFR BLD: 6.8 % (ref 4–15)
NEUTROPHILS # BLD AUTO: 2.2 K/UL (ref 1.8–7.7)
NEUTROPHILS NFR BLD: 50.5 % (ref 38–73)
NITRITE UR QL STRIP: ABNORMAL
NRBC BLD-RTO: 0 /100 WBC
NUM UNITS TRANS PACKED RBC: NORMAL
NUM UNITS TRANS PACKED RBC: NORMAL
PH UR STRIP: ABNORMAL [PH] (ref 5–8)
PHOSPHATE SERPL-MCNC: 3.4 MG/DL (ref 2.7–4.5)
PLATELET # BLD AUTO: 230 K/UL (ref 150–350)
PMV BLD AUTO: 10.7 FL (ref 9.2–12.9)
POTASSIUM SERPL-SCNC: 3.9 MMOL/L (ref 3.5–5.1)
POTASSIUM SERPL-SCNC: 3.9 MMOL/L (ref 3.5–5.1)
PROT UR QL STRIP: ABNORMAL
PROTHROMBIN TIME: 12.3 SEC (ref 9–12.5)
RBC # BLD AUTO: 2.68 M/UL (ref 4.6–6.2)
RBC #/AREA URNS HPF: >100 /HPF (ref 0–4)
SODIUM SERPL-SCNC: 142 MMOL/L (ref 136–145)
SODIUM SERPL-SCNC: 142 MMOL/L (ref 136–145)
SP GR UR STRIP: ABNORMAL (ref 1–1.03)
SQUAMOUS #/AREA URNS HPF: 1 /HPF
URN SPEC COLLECT METH UR: ABNORMAL
UROBILINOGEN UR STRIP-ACNC: ABNORMAL EU/DL
WBC # BLD AUTO: 4.38 K/UL (ref 3.9–12.7)
WBC #/AREA URNS HPF: 45 /HPF (ref 0–5)

## 2019-10-16 PROCEDURE — 63600175 PHARM REV CODE 636 W HCPCS: Performed by: HOSPITALIST

## 2019-10-16 PROCEDURE — 36430 TRANSFUSION BLD/BLD COMPNT: CPT

## 2019-10-16 PROCEDURE — 12000002 HC ACUTE/MED SURGE SEMI-PRIVATE ROOM

## 2019-10-16 PROCEDURE — P9016 RBC LEUKOCYTES REDUCED: HCPCS

## 2019-10-16 PROCEDURE — 86850 RBC ANTIBODY SCREEN: CPT

## 2019-10-16 PROCEDURE — 94761 N-INVAS EAR/PLS OXIMETRY MLT: CPT

## 2019-10-16 PROCEDURE — A4216 STERILE WATER/SALINE, 10 ML: HCPCS | Performed by: HOSPITALIST

## 2019-10-16 PROCEDURE — 25000003 PHARM REV CODE 250: Performed by: HOSPITALIST

## 2019-10-16 PROCEDURE — 96361 HYDRATE IV INFUSION ADD-ON: CPT

## 2019-10-16 PROCEDURE — 86920 COMPATIBILITY TEST SPIN: CPT

## 2019-10-16 PROCEDURE — 85025 COMPLETE CBC W/AUTO DIFF WBC: CPT

## 2019-10-16 PROCEDURE — 96376 TX/PRO/DX INJ SAME DRUG ADON: CPT

## 2019-10-16 PROCEDURE — 36415 COLL VENOUS BLD VENIPUNCTURE: CPT

## 2019-10-16 PROCEDURE — 85610 PROTHROMBIN TIME: CPT

## 2019-10-16 PROCEDURE — 80069 RENAL FUNCTION PANEL: CPT

## 2019-10-16 RX ORDER — SODIUM CHLORIDE 0.9 % (FLUSH) 0.9 %
10 SYRINGE (ML) INJECTION
Status: DISCONTINUED | OUTPATIENT
Start: 2019-10-16 | End: 2019-10-18 | Stop reason: HOSPADM

## 2019-10-16 RX ORDER — HYDROCODONE BITARTRATE AND ACETAMINOPHEN 500; 5 MG/1; MG/1
TABLET ORAL
Status: DISCONTINUED | OUTPATIENT
Start: 2019-10-16 | End: 2019-10-18 | Stop reason: HOSPADM

## 2019-10-16 RX ORDER — FUROSEMIDE 10 MG/ML
20 INJECTION INTRAMUSCULAR; INTRAVENOUS
Status: DISCONTINUED | OUTPATIENT
Start: 2019-10-16 | End: 2019-10-18 | Stop reason: HOSPADM

## 2019-10-16 RX ADMIN — HYDROMORPHONE HYDROCHLORIDE 0.5 MG: 2 INJECTION, SOLUTION INTRAMUSCULAR; INTRAVENOUS; SUBCUTANEOUS at 12:10

## 2019-10-16 RX ADMIN — LEVOTHYROXINE SODIUM 112 MCG: 112 TABLET ORAL at 09:10

## 2019-10-16 RX ADMIN — CEFTRIAXONE 1 G: 1 INJECTION, SOLUTION INTRAVENOUS at 04:10

## 2019-10-16 RX ADMIN — Medication 3 ML: at 05:10

## 2019-10-16 RX ADMIN — Medication 3 ML: at 12:10

## 2019-10-16 RX ADMIN — DULOXETINE 30 MG: 30 CAPSULE, DELAYED RELEASE ORAL at 09:10

## 2019-10-16 RX ADMIN — SODIUM CHLORIDE: 0.9 INJECTION, SOLUTION INTRAVENOUS at 01:10

## 2019-10-16 RX ADMIN — SODIUM CHLORIDE: 0.9 INJECTION, SOLUTION INTRAVENOUS at 09:10

## 2019-10-16 RX ADMIN — SENNOSIDES, DOCUSATE SODIUM 1 TABLET: 50; 8.6 TABLET, FILM COATED ORAL at 08:10

## 2019-10-16 NOTE — PLAN OF CARE
Patient AAO, VSS. Pt verbalized understanding of POC. IVF infusing as ordered. Pain well managed with current pain management plan.  PRN medications utilized. Cotter catheter patent and draining dark red urine.  Purposeful hourly/q2hr rounding done during shift to promote patient safety. Patient free from falls and injury during shift.  Will continue to monitor.

## 2019-10-16 NOTE — PROGRESS NOTES
[facility]   Hematology/Oncology  Inpatient Progress Note          Patient Name: Mo Escobar  MRN: [id number]  Admission Date: 10/14/2019  Hospital Length of Stay: 0 days  Code Status: Full Code   Attending Provider: Sandy Mondragon MD  Consulting Provider: Dion Garrido MD  Primary Care Physician: Eleonora Mccabe NP  Principal Problem:Gross hematuria      Subjective:       Patient ID: Mo Escobar is a 59 y.o. male.    Chief Complaint: No chief complaint on file.        History Present Illness:  Patient has been moved to private room; he has had drop in his hemoglobin and is to get 2 units of blood today; he has had some HA's; no CP, SOB, N or V; barker still in place with red urine which is clearer today; there are no family members at bedside; I talked to his floor nurse      Review of Systems:  GEN: normal without any fever, night sweats or weight loss  HEENT: normal with no HA's, sore throat, stiff neck, changes in vision  CV: normal with no CP, SOB, PND, HERRERA or orthopnea  PULM: normal with no SOB, cough, hemoptysis, sputum or pleuritic pain  GI: normal with no abdominal pain, nausea, vomiting, constipation, diarrhea, melanotic stools, BRBPR, or hematemesis  : hematuria  BREAST: normal with no mass, discharge, pain  SKIN: normal with no rash, erythema, bruising, or swelling      Objective:     Vitals:  Blood pressure (!) 107/52, pulse (!) 56, temperature 97.1 °F (36.2 °C), temperature source Oral, resp. rate 16, height 6' (1.829 m), weight 128.4 kg (283 lb 1.6 oz), SpO2 95 %.    Physical Exam:  GEN: no apparent distress, comfortable; AAOx3; overweight  HEAD: atraumatic and normocephalic  EYES: no pallor, no icterus, PERRLA  ENT: OMM, no pharyngeal erythema, external ears WNL; no nasal discharge; no thrush  NECK: no masses, thyroid normal, trachea midline, no LAD/LN's, supple  CV: RRR with no murmur; normal pulse; normal S1 and S2; no pedal edema  CHEST: Normal respiratory effort; CTAB;  normal breath sounds; no wheeze or crackles  ABDOM: nontender and nondistended; soft; normal bowel sounds; no rebound/guarding  MUSC/Skeletal: ROM normal; no crepitus; joints normal; no deformities or arthropathy  EXTREM: no clubbing, cyanosis, inflammation or swelling  SKIN: no rashes, lesions, ulcers, petechiae or subcutaneous nodules  : barker with reddish urine that is clearer today  NEURO: grossly intact; motor/sensory WNL; AAOx3; no tremors  PSYCH: normal mood, affect and behavior  LYMPH: normal cervical, supraclavicular, axillary and groin LN's        Lab Review:        Lab Results   Component Value Date    WBC 4.38 10/16/2019    HGB 7.2 (L) 10/16/2019    HCT 22.8 (L) 10/16/2019    MCV 85 10/16/2019     10/16/2019     CMP  Sodium   Date Value Ref Range Status   10/16/2019 142 136 - 145 mmol/L Final   10/16/2019 142 136 - 145 mmol/L Final   06/27/2017 137 134 - 144 mmol/L      Potassium   Date Value Ref Range Status   10/16/2019 3.9 3.5 - 5.1 mmol/L Final   10/16/2019 3.9 3.5 - 5.1 mmol/L Final     Chloride   Date Value Ref Range Status   10/16/2019 110 95 - 110 mmol/L Final   10/16/2019 110 95 - 110 mmol/L Final   06/27/2017 100 98 - 110 mmol/L      CO2   Date Value Ref Range Status   10/16/2019 26 23 - 29 mmol/L Final   10/16/2019 26 23 - 29 mmol/L Final     Glucose   Date Value Ref Range Status   10/16/2019 108 70 - 110 mg/dL Final   10/16/2019 108 70 - 110 mg/dL Final   06/27/2017 128 (H) 70 - 99 mg/dL      BUN, Bld   Date Value Ref Range Status   10/16/2019 8 6 - 20 mg/dL Final   10/16/2019 8 6 - 20 mg/dL Final     Creatinine   Date Value Ref Range Status   10/16/2019 0.8 0.5 - 1.4 mg/dL Final   10/16/2019 0.8 0.5 - 1.4 mg/dL Final   06/27/2017 1.15 0.60 - 1.40 mg/dL      Calcium   Date Value Ref Range Status   10/16/2019 8.0 (L) 8.7 - 10.5 mg/dL Final   10/16/2019 8.0 (L) 8.7 - 10.5 mg/dL Final     Total Protein   Date Value Ref Range Status   10/14/2019 6.5 6.0 - 8.4 g/dL Final     Albumin    Date Value Ref Range Status   10/16/2019 2.9 (L) 3.5 - 5.2 g/dL Final   06/27/2017 4.2 3.1 - 4.7 g/dL      Total Bilirubin   Date Value Ref Range Status   10/14/2019 0.3 0.1 - 1.0 mg/dL Final     Comment:     For infants and newborns, interpretation of results should be based  on gestational age, weight and in agreement with clinical  observations.  Premature Infant recommended reference ranges:  Up to 24 hours.............<8.0 mg/dL  Up to 48 hours............<12.0 mg/dL  3-5 days..................<15.0 mg/dL  6-29 days.................<15.0 mg/dL       Alkaline Phosphatase   Date Value Ref Range Status   10/14/2019 47 (L) 55 - 135 U/L Final     AST   Date Value Ref Range Status   10/14/2019 26 10 - 40 U/L Final     ALT   Date Value Ref Range Status   10/14/2019 45 (H) 10 - 44 U/L Final     Anion Gap   Date Value Ref Range Status   10/16/2019 6 (L) 8 - 16 mmol/L Final   10/16/2019 6 (L) 8 - 16 mmol/L Final     eGFR if    Date Value Ref Range Status   10/16/2019 >60 >60 mL/min/1.73 m^2 Final   10/16/2019 >60 >60 mL/min/1.73 m^2 Final     eGFR if non    Date Value Ref Range Status   10/16/2019 >60 >60 mL/min/1.73 m^2 Final     Comment:     Calculation used to obtain the estimated glomerular filtration  rate (eGFR) is the CKD-EPI equation.      10/16/2019 >60 >60 mL/min/1.73 m^2 Final     Comment:     Calculation used to obtain the estimated glomerular filtration  rate (eGFR) is the CKD-EPI equation.              Radiology Diagnostic Studies:           Assessment:     IMPRESSION:    (1) 59 y.o. male known to my hematology service with diagnosis of chronic clot disorder with prior bilateral pulmonary emboli  - he has underlying clot disorder with MTHFR-A and MTHFR-C heterozygous condition  - he has been on coumadin long term per direction of Dr Best with cardiology  - latest INR was 1.2        (2) Recurrent hematuria issues - he has been under the care of Dr Mcconnell with   - prior  cyctoscope and TURP with Dr Mcconnell; everything looked good per patient  - he has plans to see Dr Eduardo in [place] about his prostate in the near future     (3) Hx/of Recurrent clot event with prior DVT in the right popliteal vein  - he has been on coumadin per direction of Dr Best     (4) Pulmonary HTN hx - followed by Dr Robles with Pulmonary     (5) Hx/of gout issues     (6) Anemia - chronic with acute exacerbation with blood loss from hematuria  - microcytic indices  - he has been on oral iron   - hgb currently at 7.2        1. Clot retention of urine    2. Hematuria    3. Chest pain    4. Heart failure    5. Anemia due to chronic blood loss    6. Microcytic anemia           Plan:     PLAN:      1. Recommend holding coumadin and consider placing him on lovenox until the hematuria resolves or is under control and after the cystocope  2. Monitor labs and transfuse as needed - recommended 2 units blood for today  3. Check iron panel with next labs  4. Urology as per  directives - discussed with Dr Mcconnell and he plans to do cystoscope on Thursday am and will resume lovenox later that afternoon  5. Will follow with you               Dion Garrido MD  Hematology/Oncology  [facility]

## 2019-10-16 NOTE — PLAN OF CARE
Plan of care reviewed with patient. Safety precautions maintained. Pt remains free form injury. Cotter catheter in place and draining. Denies pain other than intermittent bladder spasms. Blood transfusion today. NPO after midnight tonight. Ambulates stand by assist. TEDs and SCDs on for VTE prevention. Frequent checks performed q2h. Vital signs stable. Afebrile. AAOx4. Bed locked and low. Siderails raised x2. Non-skid socks on. Call light within reach.

## 2019-10-16 NOTE — PROGRESS NOTES
[facility] Medicine  Progress Note    Patient Name: Mo Escobar  MRN: [id number]  Patient Class: OP- Observation   Admission Date: 10/14/2019  Length of Stay: 0 days  Attending Physician: Sandy Mondragon MD  Primary Care Provider: Eleonora Mccabe NP        Subjective:     Principal Problem:Gross hematuria        HPI:  Mr. Escobar is a 59 year old man with a past medical history of thrombophilia with PE and DVTs, hypothyroidism, anemia, and CAIT, bph, morbid obesity, chronic back pain who is 3 weeks s/p 3rd TURP who was directly admitted from DR Mcconnell office for noted gross hematuria. He had a barker catheter placed in ED the evening prior for urinary retention. A 3 way catheter was placed by Dr Mcconnell and bladder irrigation inititated in his office however the patient became sob and diaphoretic so sent for admission, pain control, eval of sob and continued CBI. He had been on lovenox with bridge to coumadin for his coagulopathy and INR in ED was 2.3. All anticoagulation will be held and his hematologist Dr Garrido notified.     At the time of my exam his urine was pink tinged and he had no further pain. He reports there was a clot irrigated and his symptoms resolved with that . He denied fever/chills, chest pain  Or n/v or dysuria.     Overview/Hospital Course:  No notes on file    Interval History:   No clots overnight /free urine flow/pain resolved wrt gu issues unless he moves   afebrile.   C/p back pain-improved with sitting up       Review of Systems   Constitutional: Negative for chills and fever.   HENT: Negative for congestion and nosebleeds.    Respiratory: Negative for cough and shortness of breath.    Cardiovascular: Negative for chest pain, palpitations and leg swelling.   Gastrointestinal: Negative for blood in stool and constipation.   Genitourinary: Positive for hematuria. Negative for difficulty urinating and dysuria.   Musculoskeletal: Positive for back pain. Negative  for arthralgias.     Objective:     Vital Signs (Most Recent):  Temp: 97.5 °F (36.4 °C) (10/15/19 0241)  Pulse: 66 (10/15/19 0241)  Resp: 18 (10/15/19 0241)  BP: (!) 116/58 (10/15/19 0241)  SpO2: 96 % (10/15/19 0241) Vital Signs (24h Range):  Temp:  [97.5 °F (36.4 °C)-99.1 °F (37.3 °C)] 97.5 °F (36.4 °C)  Pulse:  [] 66  Resp:  [18-20] 18  SpO2:  [95 %-98 %] 96 %  BP: (100-132)/(58-67) 116/58     Weight: 128.4 kg (283 lb 1.6 oz)  Body mass index is 38.4 kg/m².    Intake/Output Summary (Last 24 hours) at 10/15/2019 0727  Last data filed at 10/15/2019 0611  Gross per 24 hour   Intake 550 ml   Output 2400 ml   Net -1850 ml      Physical Exam   Constitutional: He is oriented to person, place, and time. He appears well-developed and well-nourished. No distress.   HENT:   Head: Normocephalic and atraumatic.   Right Ear: External ear normal.   Left Ear: External ear normal.   Nose: Nose normal.   Mouth/Throat: Oropharynx is clear and moist. No oropharyngeal exudate.   Eyes: Conjunctivae and EOM are normal. Right eye exhibits no discharge. Left eye exhibits no discharge. No scleral icterus.   Neck: Normal range of motion. Neck supple. No thyromegaly present.   Cardiovascular: Normal rate, regular rhythm, normal heart sounds and intact distal pulses. Exam reveals no gallop and no friction rub.   No murmur heard.  Pulmonary/Chest: Effort normal and breath sounds normal. No respiratory distress. He has no wheezes.   Abdominal: Soft. Bowel sounds are normal. He exhibits no distension and no mass. There is no tenderness. There is no rebound and no guarding.   Genitourinary:   Genitourinary Comments: Cotter with red  urine -no noted clots    Musculoskeletal: Normal range of motion. He exhibits no edema or tenderness.   Lymphadenopathy:     He has no cervical adenopathy.   Neurological: He is alert and oriented to person, place, and time. No cranial nerve deficit. Coordination normal.   Skin: Skin is warm and dry. Capillary  refill takes less than 2 seconds. No rash noted. No erythema.   Psychiatric: He has a normal mood and affect. His behavior is normal. Judgment and thought content normal.   Nursing note and vitals reviewed.      Significant Labs: All pertinent labs within the past 24 hours have been reviewed.    Significant Imaging: I have reviewed and interpreted all pertinent imaging results/findings within the past 24 hours.      Assessment/Plan:      * Gross hematuria  Following with urology-3 way/CBI and trending h/h  Bladder us as follows:  Findings suggesting organized hematoma in the bladder around the catheter measuring approximately 3.5 cm.    Anticoagulation on hold -consider cystoscopy per urology   Empiric rocephin   Urine culture obtained in ED 10/13-will follow       Chronic anticoagulation  Chronic 2/2 coagulopathy-was on lovenox with bridge to coumadin. -currently both on hold 2/2 gross hematuria post op turp. (3 weeks prior)  Trending INR -down and bleed seems to have resolved   Discussed with urology /hematology -holding anticoagulation in possibility of bleed and need for cysto planned for 10/17 -will resume lovenox post op or on the day if no need for surgery        Acute blood loss anemia  Acute 2/2 hematuria  Monitor and transfuse if he becomes hemodynamically unstable or H/H < 7/21  Anticoagulation on hold /hematology consulted -see chronic anticoagulation      BMI 40.0-44.9, adult  Morbid obesity complicates all aspects of disease management from diagnostic modalities to treatment. Weight loss encouraged and health benefits explained to patient.        Heterozygous MTHFR mutation [id number]  See chronic anticoag   CTA chest -neg for Acute PE       Chronic bilateral low back pain with sciatica  Chronic stable, continue neuropathic meds/pain control   Consider pt/ot while in house but he is ambulatory     Chronic diastolic heart failure  Mild diastolic dysfunction -on past records-no echo in chart   Monitor fluid  status closely-strict I/O to avoid exacerbation       Obstructive sleep apnea syndrome  Chronic, stable, continue cpap /oxygen         VTE Risk Mitigation (From admission, onward)         Ordered     Place LOREN hose  Until discontinued      10/14/19 1529     Place sequential compression device  Until discontinued      10/14/19 1529     IP VTE HIGH RISK PATIENT  Once      10/14/19 1529                      Sandy Mondragon MD  Department of Hospital Medicine   [facility]

## 2019-10-16 NOTE — ASSESSMENT & PLAN NOTE
Mild diastolic dysfunction -on past records-no echo in chart   Monitor fluid status closely-strict I/O to avoid exacerbation

## 2019-10-16 NOTE — UM SECONDARY REVIEW
Physician Advisor External    Level of Care Issue    Referral to EHR for OBS dos 10/15/19    IP for DOS 10/15/19 per EHR review

## 2019-10-16 NOTE — NURSING
Nurse from Dr. Garrido's office called and said Dr. Garrido wants pt to receive 2units blood today and to let hospitalist know. Notified Dr. Mondragon via secure chat. Will call if no response.

## 2019-10-16 NOTE — ASSESSMENT & PLAN NOTE
Chronic stable, continue neuropathic meds/pain control   Consider pt/ot while in house but he is ambulatory

## 2019-10-16 NOTE — PLAN OF CARE
10/16/19 1029   PRE-TX-O2   O2 Device (Oxygen Therapy) room air   SpO2 97 %   Pulse Oximetry Type Intermittent   $ Pulse Oximetry - Multiple Charge Pulse Oximetry - Multiple

## 2019-10-16 NOTE — PROGRESS NOTES
Urine fluctuating clear pink vs koolaid depending on movement but draining with clear consistency. Occ bladder spasms. Mild decline in H/H some of which may be dilutional with IVF since admit  INR 1.6 today  CBI clamped since this am and has been OOB    Extensive risk benefit discussion with pt and wife about further observation, holding vs resuming bloodthinners, further surgical exploration    Plan will be add on Thursday for clot evac/fulg once INR normalizes tomorrow.  If urine completely clears tomorrow would consider downsizing barker and resuming anticoagulation in controlled setting. Given latest trajectory and concern for small residual clot vs slow ooze, if urine not completely clear by tomorrow evening proceed as planned on Thursday when there would be minimal risk of instrumentation.    Assuming goes to OR Thursday, could resume lovenox as early as thurs evening and get thurs pm and fri am dose and if urine clear by midday Friday, DC home with barker and remove as outpatient following week.    Discussed plan with Maribel Mondragon and Hema who are in agreement. Likely to remain on lovenox as outpatient for 1 week per Dr Garrido before bridging back with coumadin

## 2019-10-16 NOTE — ASSESSMENT & PLAN NOTE
Chronic 2/2 coagulopathy-was on lovenox with bridge to coumadin. -currently both on hold 2/2 gross hematuria post op turp. (3 weeks prior)  Trending INR -down and bleed seems to have resolved   Discussed with urology /hematology -holding anticoagulation in possibility of bleed and need for cysto planned for 10/17 -will resume lovenox post op or on the day if no need for surgery

## 2019-10-16 NOTE — PROGRESS NOTES
Hb to 7.2 today Getting 2 u prbc. Mild dizzines with ambulation/HA otherwise asymptomatic  Nursing reports some clots through tubing today  Urine currently draining clear yellow - microclot in tubing    Again risk benefit discussion about observation/exploration.  Though urine clear, has 24 fr 3 way barker in. And INR normal now and off bloodthinners  Dependent on chronic anticoagulation 2/2 clotting disorder, and since needs to restart, will proceed with cystoscopic exploration in AM. At minimum, eval with flexible cystoscope. If no clot residual or areas of concern, downsize barker and leave in place pending anticoag. Possible transurethral fulguration and clot evac. If urine clear throughout day, can restart lovenox in evening.

## 2019-10-16 NOTE — PROGRESS NOTES
Clinical Pharmacy Chart Review Note    Mo Escobar is a 59 y.o. male was rounded on 10/16/2019 by a pharmacist alongside the hospitalist during bedside rounds. The patient was thoroughly reviewed and monitored by the hospitalist and pharmacist to ensure medication for specific disease states and/or problems were properly prescribed, dispensed, administered, and monitored. Identified dosing adjustments were addressed, in addition to assessment of proper dosing based on current renal/hepatic function. Pharmacy will provide medication recommendations whenever necessary to the provider to ensure positive patient outcomes. High risk adverse effects were reviewed will all healthcare providers involved in this patient's care. A medication reconciliation and drug-interaction check has been completed by a pharmacist. Discrepancies will be addressed and resolved on an on-going bases and pharmacy will continue to follow this patient until discharge. Feel free to contact us if you have any questions.    Mira Richard, PharmD  [facility] PGY1 Pharmacy Resident  [phone]

## 2019-10-17 ENCOUNTER — ANESTHESIA (OUTPATIENT)
Dept: SURGERY | Facility: HOSPITAL | Age: 59
DRG: 699 | End: 2019-10-17
Payer: MEDICARE

## 2019-10-17 PROBLEM — R33.8 CLOT RETENTION OF URINE: Status: ACTIVE | Noted: 2017-09-06

## 2019-10-17 LAB
ALBUMIN SERPL BCP-MCNC: 2.9 G/DL (ref 3.5–5.2)
ANION GAP SERPL CALC-SCNC: 6 MMOL/L (ref 8–16)
ANION GAP SERPL CALC-SCNC: 6 MMOL/L (ref 8–16)
BACTERIA UR CULT: NO GROWTH
BASOPHILS # BLD AUTO: 0.04 K/UL (ref 0–0.2)
BASOPHILS NFR BLD: 0.7 % (ref 0–1.9)
BUN SERPL-MCNC: 8 MG/DL (ref 6–20)
BUN SERPL-MCNC: 8 MG/DL (ref 6–20)
CALCIUM SERPL-MCNC: 8.3 MG/DL (ref 8.7–10.5)
CALCIUM SERPL-MCNC: 8.3 MG/DL (ref 8.7–10.5)
CHLORIDE SERPL-SCNC: 111 MMOL/L (ref 95–110)
CHLORIDE SERPL-SCNC: 111 MMOL/L (ref 95–110)
CO2 SERPL-SCNC: 24 MMOL/L (ref 23–29)
CO2 SERPL-SCNC: 24 MMOL/L (ref 23–29)
CREAT SERPL-MCNC: 0.8 MG/DL (ref 0.5–1.4)
CREAT SERPL-MCNC: 0.8 MG/DL (ref 0.5–1.4)
DIFFERENTIAL METHOD: ABNORMAL
EOSINOPHIL # BLD AUTO: 0.2 K/UL (ref 0–0.5)
EOSINOPHIL NFR BLD: 4 % (ref 0–8)
ERYTHROCYTE [DISTWIDTH] IN BLOOD BY AUTOMATED COUNT: 13.7 % (ref 11.5–14.5)
EST. GFR  (AFRICAN AMERICAN): >60 ML/MIN/1.73 M^2
EST. GFR  (AFRICAN AMERICAN): >60 ML/MIN/1.73 M^2
EST. GFR  (NON AFRICAN AMERICAN): >60 ML/MIN/1.73 M^2
EST. GFR  (NON AFRICAN AMERICAN): >60 ML/MIN/1.73 M^2
GLUCOSE SERPL-MCNC: 105 MG/DL (ref 70–110)
GLUCOSE SERPL-MCNC: 105 MG/DL (ref 70–110)
HCT VFR BLD AUTO: 27.5 % (ref 40–54)
HGB BLD-MCNC: 8.9 G/DL (ref 14–18)
IMM GRANULOCYTES # BLD AUTO: 0.07 K/UL (ref 0–0.04)
INR PPP: 1 (ref 0.8–1.2)
LYMPHOCYTES # BLD AUTO: 1.7 K/UL (ref 1–4.8)
LYMPHOCYTES NFR BLD: 29.3 % (ref 18–48)
MCH RBC QN AUTO: 27.1 PG (ref 27–31)
MCHC RBC AUTO-ENTMCNC: 32.4 G/DL (ref 32–36)
MCV RBC AUTO: 84 FL (ref 82–98)
MONOCYTES # BLD AUTO: 0.5 K/UL (ref 0.3–1)
MONOCYTES NFR BLD: 8.8 % (ref 4–15)
NEUTROPHILS # BLD AUTO: 3.3 K/UL (ref 1.8–7.7)
NEUTROPHILS NFR BLD: 56 % (ref 38–73)
NRBC BLD-RTO: 0 /100 WBC
PHOSPHATE SERPL-MCNC: 3.9 MG/DL (ref 2.7–4.5)
PLATELET # BLD AUTO: 252 K/UL (ref 150–350)
PMV BLD AUTO: 10.4 FL (ref 9.2–12.9)
POTASSIUM SERPL-SCNC: 3.7 MMOL/L (ref 3.5–5.1)
POTASSIUM SERPL-SCNC: 3.7 MMOL/L (ref 3.5–5.1)
PROTHROMBIN TIME: 10.2 SEC (ref 9–12.5)
RBC # BLD AUTO: 3.28 M/UL (ref 4.6–6.2)
SODIUM SERPL-SCNC: 141 MMOL/L (ref 136–145)
SODIUM SERPL-SCNC: 141 MMOL/L (ref 136–145)
WBC # BLD AUTO: 5.8 K/UL (ref 3.9–12.7)

## 2019-10-17 PROCEDURE — D9220A PRA ANESTHESIA: Mod: ANES,,, | Performed by: ANESTHESIOLOGY

## 2019-10-17 PROCEDURE — 52000 CYSTOURETHROSCOPY: CPT | Mod: 78,,, | Performed by: UROLOGY

## 2019-10-17 PROCEDURE — 37000008 HC ANESTHESIA 1ST 15 MINUTES: Performed by: UROLOGY

## 2019-10-17 PROCEDURE — 85610 PROTHROMBIN TIME: CPT

## 2019-10-17 PROCEDURE — 80069 RENAL FUNCTION PANEL: CPT

## 2019-10-17 PROCEDURE — 63600175 PHARM REV CODE 636 W HCPCS: Performed by: UROLOGY

## 2019-10-17 PROCEDURE — 25000003 PHARM REV CODE 250: Performed by: NURSE ANESTHETIST, CERTIFIED REGISTERED

## 2019-10-17 PROCEDURE — A4216 STERILE WATER/SALINE, 10 ML: HCPCS | Performed by: HOSPITALIST

## 2019-10-17 PROCEDURE — 12000002 HC ACUTE/MED SURGE SEMI-PRIVATE ROOM

## 2019-10-17 PROCEDURE — 94761 N-INVAS EAR/PLS OXIMETRY MLT: CPT

## 2019-10-17 PROCEDURE — 25000003 PHARM REV CODE 250: Performed by: UROLOGY

## 2019-10-17 PROCEDURE — 71000033 HC RECOVERY, INTIAL HOUR: Performed by: UROLOGY

## 2019-10-17 PROCEDURE — 99900103 DSU ONLY-NO CHARGE-INITIAL HR (STAT): Performed by: UROLOGY

## 2019-10-17 PROCEDURE — 36000706: Performed by: UROLOGY

## 2019-10-17 PROCEDURE — 71000039 HC RECOVERY, EACH ADD'L HOUR: Performed by: UROLOGY

## 2019-10-17 PROCEDURE — 63600175 PHARM REV CODE 636 W HCPCS: Performed by: ANESTHESIOLOGY

## 2019-10-17 PROCEDURE — 36415 COLL VENOUS BLD VENIPUNCTURE: CPT

## 2019-10-17 PROCEDURE — 63600175 PHARM REV CODE 636 W HCPCS: Performed by: INTERNAL MEDICINE

## 2019-10-17 PROCEDURE — D9220A PRA ANESTHESIA: ICD-10-PCS | Mod: CRNA,,, | Performed by: NURSE ANESTHETIST, CERTIFIED REGISTERED

## 2019-10-17 PROCEDURE — D9220A PRA ANESTHESIA: Mod: CRNA,,, | Performed by: NURSE ANESTHETIST, CERTIFIED REGISTERED

## 2019-10-17 PROCEDURE — 99900104 DSU ONLY-NO CHARGE-EA ADD'L HR (STAT): Performed by: UROLOGY

## 2019-10-17 PROCEDURE — 63600175 PHARM REV CODE 636 W HCPCS: Performed by: NURSE ANESTHETIST, CERTIFIED REGISTERED

## 2019-10-17 PROCEDURE — D9220A PRA ANESTHESIA: ICD-10-PCS | Mod: ANES,,, | Performed by: ANESTHESIOLOGY

## 2019-10-17 PROCEDURE — 25000003 PHARM REV CODE 250: Performed by: HOSPITALIST

## 2019-10-17 PROCEDURE — 36000707: Performed by: UROLOGY

## 2019-10-17 PROCEDURE — 52000 PR CYSTOURETHROSCOPY: ICD-10-PCS | Mod: 78,,, | Performed by: UROLOGY

## 2019-10-17 PROCEDURE — 85025 COMPLETE CBC W/AUTO DIFF WBC: CPT

## 2019-10-17 PROCEDURE — 63600175 PHARM REV CODE 636 W HCPCS: Performed by: HOSPITALIST

## 2019-10-17 PROCEDURE — 27200651 HC AIRWAY, LMA: Performed by: NURSE ANESTHETIST, CERTIFIED REGISTERED

## 2019-10-17 PROCEDURE — A4216 STERILE WATER/SALINE, 10 ML: HCPCS | Performed by: UROLOGY

## 2019-10-17 PROCEDURE — 37000009 HC ANESTHESIA EA ADD 15 MINS: Performed by: UROLOGY

## 2019-10-17 RX ORDER — ONDANSETRON 2 MG/ML
INJECTION INTRAMUSCULAR; INTRAVENOUS
Status: DISCONTINUED | OUTPATIENT
Start: 2019-10-17 | End: 2019-10-17

## 2019-10-17 RX ORDER — GLYCOPYRROLATE 0.2 MG/ML
INJECTION INTRAMUSCULAR; INTRAVENOUS
Status: DISCONTINUED | OUTPATIENT
Start: 2019-10-17 | End: 2019-10-17

## 2019-10-17 RX ORDER — LIDOCAINE HCL/PF 100 MG/5ML
SYRINGE (ML) INTRAVENOUS
Status: DISCONTINUED | OUTPATIENT
Start: 2019-10-17 | End: 2019-10-17

## 2019-10-17 RX ORDER — FENTANYL CITRATE 50 UG/ML
INJECTION, SOLUTION INTRAMUSCULAR; INTRAVENOUS
Status: DISCONTINUED | OUTPATIENT
Start: 2019-10-17 | End: 2019-10-17

## 2019-10-17 RX ORDER — FENTANYL CITRATE 50 UG/ML
25 INJECTION, SOLUTION INTRAMUSCULAR; INTRAVENOUS EVERY 5 MIN PRN
Status: DISCONTINUED | OUTPATIENT
Start: 2019-10-17 | End: 2019-10-17 | Stop reason: HOSPADM

## 2019-10-17 RX ORDER — LIDOCAINE HYDROCHLORIDE 10 MG/ML
1 INJECTION, SOLUTION EPIDURAL; INFILTRATION; INTRACAUDAL; PERINEURAL ONCE
Status: DISCONTINUED | OUTPATIENT
Start: 2019-10-17 | End: 2019-10-17 | Stop reason: HOSPADM

## 2019-10-17 RX ORDER — ONDANSETRON 2 MG/ML
4 INJECTION INTRAMUSCULAR; INTRAVENOUS ONCE AS NEEDED
Status: DISCONTINUED | OUTPATIENT
Start: 2019-10-17 | End: 2019-10-17 | Stop reason: HOSPADM

## 2019-10-17 RX ORDER — ENOXAPARIN SODIUM 100 MG/ML
40 INJECTION SUBCUTANEOUS EVERY 24 HOURS
Status: DISCONTINUED | OUTPATIENT
Start: 2019-10-17 | End: 2019-10-17

## 2019-10-17 RX ORDER — ENOXAPARIN SODIUM 100 MG/ML
130 INJECTION SUBCUTANEOUS EVERY 24 HOURS
Status: DISCONTINUED | OUTPATIENT
Start: 2019-10-18 | End: 2019-10-17

## 2019-10-17 RX ORDER — GENTAMICIN SULFATE 80 MG/100ML
80 INJECTION, SOLUTION INTRAVENOUS
Status: COMPLETED | OUTPATIENT
Start: 2019-10-17 | End: 2019-10-17

## 2019-10-17 RX ORDER — OXYCODONE HYDROCHLORIDE 5 MG/1
5 TABLET ORAL ONCE AS NEEDED
Status: DISCONTINUED | OUTPATIENT
Start: 2019-10-17 | End: 2019-10-17 | Stop reason: HOSPADM

## 2019-10-17 RX ORDER — PROPOFOL 10 MG/ML
VIAL (ML) INTRAVENOUS
Status: DISCONTINUED | OUTPATIENT
Start: 2019-10-17 | End: 2019-10-17

## 2019-10-17 RX ORDER — SODIUM CHLORIDE, SODIUM LACTATE, POTASSIUM CHLORIDE, CALCIUM CHLORIDE 600; 310; 30; 20 MG/100ML; MG/100ML; MG/100ML; MG/100ML
INJECTION, SOLUTION INTRAVENOUS CONTINUOUS
Status: DISCONTINUED | OUTPATIENT
Start: 2019-10-17 | End: 2019-10-17

## 2019-10-17 RX ORDER — FINASTERIDE 5 MG/1
5 TABLET, FILM COATED ORAL DAILY
Status: DISCONTINUED | OUTPATIENT
Start: 2019-10-17 | End: 2019-10-18 | Stop reason: HOSPADM

## 2019-10-17 RX ORDER — MIDAZOLAM HYDROCHLORIDE 1 MG/ML
INJECTION, SOLUTION INTRAMUSCULAR; INTRAVENOUS
Status: DISCONTINUED | OUTPATIENT
Start: 2019-10-17 | End: 2019-10-17

## 2019-10-17 RX ORDER — ENOXAPARIN SODIUM 150 MG/ML
130 INJECTION SUBCUTANEOUS EVERY 12 HOURS
Status: DISCONTINUED | OUTPATIENT
Start: 2019-10-17 | End: 2019-10-18 | Stop reason: HOSPADM

## 2019-10-17 RX ADMIN — FENTANYL CITRATE 100 MCG: 50 INJECTION, SOLUTION INTRAMUSCULAR; INTRAVENOUS at 07:10

## 2019-10-17 RX ADMIN — ONDANSETRON 8 MG: 2 INJECTION, SOLUTION INTRAMUSCULAR; INTRAVENOUS at 08:10

## 2019-10-17 RX ADMIN — SODIUM CHLORIDE, SODIUM LACTATE, POTASSIUM CHLORIDE, AND CALCIUM CHLORIDE: .6; .31; .03; .02 INJECTION, SOLUTION INTRAVENOUS at 06:10

## 2019-10-17 RX ADMIN — MIDAZOLAM 2 MG: 1 INJECTION INTRAMUSCULAR; INTRAVENOUS at 07:10

## 2019-10-17 RX ADMIN — Medication 3 ML: at 12:10

## 2019-10-17 RX ADMIN — SENNOSIDES, DOCUSATE SODIUM 1 TABLET: 50; 8.6 TABLET, FILM COATED ORAL at 10:10

## 2019-10-17 RX ADMIN — HYDROMORPHONE HYDROCHLORIDE 0.5 MG: 2 INJECTION, SOLUTION INTRAMUSCULAR; INTRAVENOUS; SUBCUTANEOUS at 01:10

## 2019-10-17 RX ADMIN — GLYCOPYRROLATE 0.2 MG: 0.2 INJECTION, SOLUTION INTRAMUSCULAR; INTRAVENOUS at 08:10

## 2019-10-17 RX ADMIN — HYDROMORPHONE HYDROCHLORIDE 0.5 MG: 2 INJECTION, SOLUTION INTRAMUSCULAR; INTRAVENOUS; SUBCUTANEOUS at 04:10

## 2019-10-17 RX ADMIN — Medication 3 ML: at 05:10

## 2019-10-17 RX ADMIN — CEFTRIAXONE 1 G: 1 INJECTION, SOLUTION INTRAVENOUS at 03:10

## 2019-10-17 RX ADMIN — LEVOTHYROXINE SODIUM 112 MCG: 112 TABLET ORAL at 10:10

## 2019-10-17 RX ADMIN — FENTANYL CITRATE 25 MCG: 0.05 INJECTION, SOLUTION INTRAMUSCULAR; INTRAVENOUS at 08:10

## 2019-10-17 RX ADMIN — SENNOSIDES, DOCUSATE SODIUM 1 TABLET: 50; 8.6 TABLET, FILM COATED ORAL at 08:10

## 2019-10-17 RX ADMIN — LIDOCAINE HYDROCHLORIDE 100 MG: 20 INJECTION, SOLUTION INTRAVENOUS at 07:10

## 2019-10-17 RX ADMIN — SODIUM CHLORIDE: 0.9 INJECTION, SOLUTION INTRAVENOUS at 01:10

## 2019-10-17 RX ADMIN — GENTAMICIN SULFATE 80 MG: 80 INJECTION, SOLUTION INTRAVENOUS at 06:10

## 2019-10-17 RX ADMIN — ENOXAPARIN SODIUM 130 MG: 150 INJECTION SUBCUTANEOUS at 06:10

## 2019-10-17 RX ADMIN — FINASTERIDE 5 MG: 5 TABLET, FILM COATED ORAL at 10:10

## 2019-10-17 RX ADMIN — DULOXETINE 30 MG: 30 CAPSULE, DELAYED RELEASE ORAL at 10:10

## 2019-10-17 RX ADMIN — PROPOFOL 200 MG: 10 INJECTION, EMULSION INTRAVENOUS at 07:10

## 2019-10-17 NOTE — PLAN OF CARE
Patient AAO, VSS. Pt verbalized understanding of POC. IVF infusing as ordered. Patient received 2 units of blood. Transfusion is complete. Cotter catheter patent and draining clear pink tinged urine.  Pain well managed with current pain management plan.  PRN medications utilized. Purposeful hourly/q2hr rounding done during shift to promote patient safety. Patient free from falls and injury during shift.

## 2019-10-17 NOTE — PLAN OF CARE
"S/P cystoscopy with 22Fr coude cath draining clear yellow urine. Admin. 25mg Fentanyl IVP for back pain and decrease to a "3" on 1-10 scale from a '10'. Okay to transfer per Anesthesia. Denies complaints. NAD noted.   "

## 2019-10-17 NOTE — SUBJECTIVE & OBJECTIVE
Interval History:   Pt seen/examined-s/p cysto-no bleed; barker downsized   hgb stable post transfusion  Postop patient is tolerating p.o. and having minimal pain.    Review of Systems   Constitutional: Positive for fatigue. Negative for fever.   Respiratory: Negative for cough and shortness of breath.    Genitourinary: Positive for difficulty urinating and hematuria.   Neurological: Positive for weakness. Negative for dizziness.     Objective:     Vital Signs (Most Recent):  Temp: 97.9 °F (36.6 °C) (10/17/19 0825)  Pulse: 62 (10/17/19 0845)  Resp: 20 (10/17/19 0845)  BP: 125/67 (10/17/19 0845)  SpO2: (!) 94 % (10/17/19 0845) Vital Signs (24h Range):  Temp:  [97.3 °F (36.3 °C)-99.9 °F (37.7 °C)] 97.9 °F (36.6 °C)  Pulse:  [54-79] 62  Resp:  [18-23] 20  SpO2:  [92 %-100 %] 94 %  BP: (114-141)/(56-68) 125/67     Weight: 128.4 kg (283 lb 1.6 oz)  Body mass index is 38.4 kg/m².    Intake/Output Summary (Last 24 hours) at 10/17/2019 0852  Last data filed at 10/17/2019 0548  Gross per 24 hour   Intake 5813.75 ml   Output 4550 ml   Net 1263.75 ml      Physical Exam   Constitutional: He is oriented to person, place, and time. He appears well-developed and well-nourished. No distress.   HENT:   Head: Normocephalic and atraumatic.   Right Ear: External ear normal.   Left Ear: External ear normal.   Nose: Nose normal.   Mouth/Throat: Oropharynx is clear and moist. No oropharyngeal exudate.   Eyes: Conjunctivae are normal. Right eye exhibits no discharge. Left eye exhibits no discharge. No scleral icterus.   Neck: No thyromegaly present.   Cardiovascular: Normal rate and regular rhythm.   Abdominal: He exhibits no distension and no mass. There is no tenderness. There is no rebound and no guarding.   Genitourinary:   Genitourinary Comments: Barker in with clear urine.   Musculoskeletal: He exhibits no edema or tenderness.   Lymphadenopathy:     He has no cervical adenopathy.   Neurological: He is alert and oriented to person,  place, and time.   Skin: Skin is warm and dry. Capillary refill takes less than 2 seconds. No rash noted. No erythema.   Psychiatric: He has a normal mood and affect. His behavior is normal. Judgment and thought content normal.   Nursing note and vitals reviewed.      Significant Labs:   BMP:   Recent Labs   Lab 10/17/19  0440     105     141   K 3.7  3.7   *  111*   CO2 24  24   BUN 8  8   CREATININE 0.8  0.8   CALCIUM 8.3*  8.3*     CBC:   Recent Labs   Lab 10/15/19  0910 10/16/19  0516 10/17/19  0440   WBC 6.27 4.38 5.80   HGB 8.7* 7.2* 8.9*   HCT 27.3* 22.8* 27.5*    230 252       Significant Imaging: I have reviewed and interpreted all pertinent imaging results/findings within the past 24 hours.

## 2019-10-17 NOTE — NURSING
Dr. Mcconnell notified no order for NPO after MN for Cystoscope. Order received for NPO after MN.Will continue to monitor.

## 2019-10-17 NOTE — PLAN OF CARE
10/17/19 1620   PRE-TX-O2   O2 Device (Oxygen Therapy) room air   SpO2 97 %   Pulse Oximetry Type Intermittent   $ Pulse Oximetry - Multiple Charge Pulse Oximetry - Multiple

## 2019-10-17 NOTE — ASSESSMENT & PLAN NOTE
Mild diastolic dysfunction -on past records-no echo in chart   Monitor fluid status closely-strict I/O to avoid exacerbation   Lasix after prbc transfusion

## 2019-10-17 NOTE — HOSPITAL COURSE
60 yo admitted for gross hematuria-see hpi for hx. 3way barker placed by Dr Mcconnell in office and CBI initiated, anti-coag held and urology/hematology followed. Required pain meds intermittently. IV rocephin given . Urine culture neg.   Urine remained pink/bloody so cysto done 10/17 and no bleed/clot noted so barker size changed and lovenox resumed.(was off anticoag x 4 days) --needs to bridge to coumadin per hematology. Dr Garrido follows.    Hgb did trend down and he required 2 units prbc transfusion.   Has fu with Dr Garrido and Jayesh

## 2019-10-17 NOTE — ASSESSMENT & PLAN NOTE
Following with urology-3 way/CBI and trending h/h  Bladder us as follows:  Findings suggesting organized hematoma in the bladder around the catheter measuring approximately 3.5 cm.    Anticoagulation on hold -consider cystoscopy per urology   Empiric rocephin   Urine culture obtained in ED 10/13-negative

## 2019-10-17 NOTE — OP NOTE
[facility] Urology Operative Report    Date: 10/17/2019    Staff Surgeon: Mike Mcconnell MD    Pre-Op Diagnosis: gross hematuria, clot retention    Post-Op Diagnosis: same    Procedure(s) Performed:   Cystoscopy, flexible  Barker exchange    Specimen(s): none    Anesthesia: General LMA anesthesia    Findings: well healing post TURP channel with some barker related edema and no stigmata of active bleeding or areas concerning for bleeding, no residual clot.    Estimated Blood Loss: none    Drains: 22fr coude barker    Complications: none    Indications for procedure:   Mr Young is a 60 yo M chronically anticoagulated for clotting disorder with history of significant BPH and gross hematuria of prostatic source.  Gross hematuria was intermittent ever since TURP in 2014 by outside MD. Est care with me for gross hematuria and retention in June 2017 after which I performed TURP/laser vaporization on 6/22/17 after negative prostate biopsy (with negative confirm MDx) for 92g gland. Was voiding well for a long time without hematuria which then started to recur intermittently and has been managed with finasteride, though more recently presented in urinary retention 5/20/19 of which urine that drained was clear, but later returned with clot retention requiring extensive manual irrigation, which recurred in July 2019. GH workup negative and MRI and cystoscopy found significant hyerpvascular prostate regrowth with Prostatic varices and hypervascularity seen along median lobe and within proximal prostatic urethra. Had consultation re: robotic suprapubic prostatectomy, and after risk benefit discussion elected to proceed with further transurethral management and underwent uncomplicated TURP with laser vaporization on 9/26/19 of which ovenox bridge arranged which was held for 48 hours preop.  he did well postoperatively and Barker catheter was removed 5 days postop of which time urine was clear and has remained clear until  approximately 3 weeks postoperatively when he had recurrence of gross hematuria and clot retention.  He had continued Lovenox bridge with his Coumadin, and INR had not been therapeutic as an outpatient and Coumadin dose was increased last week, and over the weekend he had passage of scab/tissue with clot that quickly yielded clot retention, for which he was manually irrigated and started on continuous bladder irrigation earlier this week and blood thinners were held.  His urine has cleared, though there is still slight clot passage, and with normalization of his INR his urine has largely cleared, but had extensive risk benefit discussion about re-exploration given the need to resume blood thinners due to his baseline clotting disorder, with significant bleeding episode earlier in the week requiring blood transfusion.    Procedure in detail:  After appropriate informed consent was obtained the patient was taken to the operating room and placed in the lithotomy position. He was prepped and draped in standard sterile fashion after removal of his current indwelling 24 [address] 3 way hematuria Cotter catheter, of which urine was clear yellow though there was a moderate blood clot in the Cotter bag.  He was current on Rocephin dosing from the floor, the received 80 mg of gentamicin as perioperative antibiotics.  A WHO approved time-out was performed    A a digital flexible cystoscope was passed per the urethra into the bladder.  Anterior urethra appeared normal without any lesions or narrowings.  Prostatic urethra demonstrated expected post TURP changes with open continuous channel to the bladder. There was mild scarring in tissue sloughing at the anterior urethra near the bladder neck and the remainder of the prostatic urethra otherwise appeared normal with expected postop changes and mild Cotter catheter related edema. The bladder was inspected and had mild trabeculations but no residual clots were seen.  Majority of middle  lobe was completely resected with slight residual edema near the bladder neck.  No stigmata of active bleeding.  The scope was withdrawn into the prostatic urethra and inspected with flow of irrigation off.  No red or inflamed areas, no areas concerning for bleeding.  Scope was withdrawn to the verumontanum and seen to have an open channel without any concern for bleeding in just mild edema and therefore the flexible cystoscope was removed and a 22 Khmer Cotter coude catheter was placed to gravity drainage with 15 cc in a 30 cc balloon.  Urine was draining clear.  60 cc catheter tip syringe was used to manually irrigate which was completely clear.  Cotter was secured to the thigh with a StatLock.    Disposition:   His urine did clear with expected management with Cotter catheter and irrigation.  He has been off any continuous bladder irrigation for a few days and has not required manual irrigation in greater than 24 hr.  His urine did clear and the remaining residual clot did seem to pass.  Prostatic urethra had no concerns for areas of bleeding, and certainly no active bleeding, and was consistent with a healing post TURP channel.  Therefore no fulguration or significant manipulation was done so as to not irritate any of this inflamed tissue and cause any more bleeding.    Cotter catheter placed, and recommend keeping it in place while resuming his anticoagulation inpatient setting.  Full-dose Lovenox is to be restarted at the discretion of Hospital Medicine and hematology and he should be observed for 24 hr with Cotter catheter in place and ambulatory while on blood thinners to make sure urine remains clear before for returning to the outpatient setting.  Cotter catheter should be left in place at discharge, and will remain indwelling for at least 5 days to make sure while he is in the outpatient setting on blood thinners, does not have rebleeding episode.  Would also recommend resuming finasteride 5 mg daily to help  control any prostatic source bleeding.    It is most likely that as he was about 3 weeks postop, he passed a small scab and small mild expected gross hematuria in this postop time frame was exacerbated by the increase in his blood thinners last week.  Resolved with expected management, and will monitor closely in the outpatient setting.

## 2019-10-17 NOTE — PROGRESS NOTES
[facility]   Hematology/Oncology  Inpatient Progress Note          Patient Name: Mo Escobar  MRN: [id number]  Admission Date: 10/14/2019  Hospital Length of Stay: 1 days  Code Status: Full Code   Attending Provider: Sandy Mondragon MD  Consulting Provider: Dion Garrido MD  Primary Care Physician: Eleonora Mccabe NP  Principal Problem:Gross hematuria      Subjective:       Patient ID: Mo Escobar is a 59 y.o. male.    Chief Complaint: hematuria      History Present Illness:  Patient has been moved to private room; he has hemoglobin is up to 8.9 after getting 2 units of blood yesterday; he has had some HA's; no CP, SOB, N or V; barker still in place with urine which is clearer today;his wife is at bedside; I talked to his floor nurse in person and communicated with both Dr Mondragon and Dr Mcconnell      Review of Systems:  GEN: normal without any fever, night sweats or weight loss  HEENT: normal with no HA's, sore throat, stiff neck, changes in vision  CV: normal with no CP, SOB, PND, HERRERA or orthopnea  PULM: normal with no SOB, cough, hemoptysis, sputum or pleuritic pain  GI: normal with no abdominal pain, nausea, vomiting, constipation, diarrhea, melanotic stools, BRBPR, or hematemesis  : hematuria improved  BREAST: normal with no mass, discharge, pain  SKIN: normal with no rash, erythema, bruising, or swelling      Objective:     Vitals:  Blood pressure 127/67, pulse 66, temperature 97.9 °F (36.6 °C), temperature source Skin, resp. rate 11, height 6' (1.829 m), weight 128.4 kg (283 lb 1.6 oz), SpO2 98 %.    Physical Exam:  GEN: no apparent distress, comfortable; AAOx3; overweight  HEAD: atraumatic and normocephalic  EYES: no pallor, no icterus, PERRLA  ENT: OMM, no pharyngeal erythema, external ears WNL; no nasal discharge; no thrush  NECK: no masses, thyroid normal, trachea midline, no LAD/LN's, supple  CV: RRR with no murmur; normal pulse; normal S1 and S2; no pedal edema  CHEST: Normal  respiratory effort; CTAB; normal breath sounds; no wheeze or crackles  ABDOM: nontender and nondistended; soft; normal bowel sounds; no rebound/guarding  MUSC/Skeletal: ROM normal; no crepitus; joints normal; no deformities or arthropathy  EXTREM: no clubbing, cyanosis, inflammation or swelling  SKIN: no rashes, lesions, ulcers, petechiae or subcutaneous nodules  : barker with clearer urine today  NEURO: grossly intact; motor/sensory WNL; AAOx3; no tremors  PSYCH: normal mood, affect and behavior  LYMPH: normal cervical, supraclavicular, axillary and groin LN's        Lab Review:        Lab Results   Component Value Date    WBC 5.80 10/17/2019    HGB 8.9 (L) 10/17/2019    HCT 27.5 (L) 10/17/2019    MCV 84 10/17/2019     10/17/2019     CMP  Sodium   Date Value Ref Range Status   10/17/2019 141 136 - 145 mmol/L Final   10/17/2019 141 136 - 145 mmol/L Final   06/27/2017 137 134 - 144 mmol/L      Potassium   Date Value Ref Range Status   10/17/2019 3.7 3.5 - 5.1 mmol/L Final   10/17/2019 3.7 3.5 - 5.1 mmol/L Final     Chloride   Date Value Ref Range Status   10/17/2019 111 (H) 95 - 110 mmol/L Final   10/17/2019 111 (H) 95 - 110 mmol/L Final   06/27/2017 100 98 - 110 mmol/L      CO2   Date Value Ref Range Status   10/17/2019 24 23 - 29 mmol/L Final   10/17/2019 24 23 - 29 mmol/L Final     Glucose   Date Value Ref Range Status   10/17/2019 105 70 - 110 mg/dL Final   10/17/2019 105 70 - 110 mg/dL Final   06/27/2017 128 (H) 70 - 99 mg/dL      BUN, Bld   Date Value Ref Range Status   10/17/2019 8 6 - 20 mg/dL Final   10/17/2019 8 6 - 20 mg/dL Final     Creatinine   Date Value Ref Range Status   10/17/2019 0.8 0.5 - 1.4 mg/dL Final   10/17/2019 0.8 0.5 - 1.4 mg/dL Final   06/27/2017 1.15 0.60 - 1.40 mg/dL      Calcium   Date Value Ref Range Status   10/17/2019 8.3 (L) 8.7 - 10.5 mg/dL Final   10/17/2019 8.3 (L) 8.7 - 10.5 mg/dL Final     Total Protein   Date Value Ref Range Status   10/14/2019 6.5 6.0 - 8.4 g/dL Final      Albumin   Date Value Ref Range Status   10/17/2019 2.9 (L) 3.5 - 5.2 g/dL Final   06/27/2017 4.2 3.1 - 4.7 g/dL      Total Bilirubin   Date Value Ref Range Status   10/14/2019 0.3 0.1 - 1.0 mg/dL Final     Comment:     For infants and newborns, interpretation of results should be based  on gestational age, weight and in agreement with clinical  observations.  Premature Infant recommended reference ranges:  Up to 24 hours.............<8.0 mg/dL  Up to 48 hours............<12.0 mg/dL  3-5 days..................<15.0 mg/dL  6-29 days.................<15.0 mg/dL       Alkaline Phosphatase   Date Value Ref Range Status   10/14/2019 47 (L) 55 - 135 U/L Final     AST   Date Value Ref Range Status   10/14/2019 26 10 - 40 U/L Final     ALT   Date Value Ref Range Status   10/14/2019 45 (H) 10 - 44 U/L Final     Anion Gap   Date Value Ref Range Status   10/17/2019 6 (L) 8 - 16 mmol/L Final   10/17/2019 6 (L) 8 - 16 mmol/L Final     eGFR if    Date Value Ref Range Status   10/17/2019 >60 >60 mL/min/1.73 m^2 Final   10/17/2019 >60 >60 mL/min/1.73 m^2 Final     eGFR if non    Date Value Ref Range Status   10/17/2019 >60 >60 mL/min/1.73 m^2 Final     Comment:     Calculation used to obtain the estimated glomerular filtration  rate (eGFR) is the CKD-EPI equation.      10/17/2019 >60 >60 mL/min/1.73 m^2 Final     Comment:     Calculation used to obtain the estimated glomerular filtration  rate (eGFR) is the CKD-EPI equation.              Radiology Diagnostic Studies:           Assessment:     IMPRESSION:    (1) 59 y.o. male known to my hematology service with diagnosis of chronic clot disorder with prior bilateral pulmonary emboli  - he has underlying clot disorder with MTHFR-A and MTHFR-C heterozygous condition  - he has been on coumadin long term per direction of Dr Best with cardiology  - latest INR was 1.0        (2) Recurrent hematuria issues - he has been under the care of Dr Mcconnell with    - prior cyctoscope and TURP with Dr Mcconnell; everything looked good per patient  - he has plans to see Dr Eduardo in [place] about his prostate in the near future  - he had scope with Dr Mcconnell this am and found a healing post turp channel with no residual clots     (3) Hx/of Recurrent clot event with prior DVT in the right popliteal vein  - he has been on coumadin per direction of Dr Best     (4) Pulmonary HTN hx - followed by Dr Robles with Pulmonary     (5) Hx/of gout issues     (6) Anemia - chronic with acute exacerbation with blood loss from hematuria  - microcytic indices  - he has been on oral iron   - hgb currently at 8.9        1. Clot retention of urine    2. Hematuria    3. Chest pain    4. Heart failure    5. Anemia due to chronic blood loss    6. Microcytic anemia    7. Gross hematuria           Plan:     PLAN:      1. plan to start lovenox this afternoon  2. Monitor labs and transfuse as needed   3. continue with the oral iron  4. Urology as per  directives - discussed with Dr Mcconnell and he performed a cystoscope this am and will resume lovenox later this afternoon  5. He will need to go home on lovenox with plans to continue for the next 1-3 weeks prior to resuming him back on coumadin  6. Will follow-up in hematology clinic in 1-2 weeks               Dion Garrido MD  Hematology/Oncology  [facility]

## 2019-10-17 NOTE — PROGRESS NOTES
approp transfusion response  inr 1.0 today  Urine clear yellow but passed clots yesterday  Reviewed plan in preop holding and obtained appropriate informed consent

## 2019-10-17 NOTE — TRANSFER OF CARE
Anesthesia Transfer of Care Note    Patient: Mo Escobar    Procedure(s) Performed: Procedure(s) (LRB):  CYSTOSCOPY (N/A)  REPLACEMENT, CATHETER (N/A)    Patient location: PACU    Anesthesia Type: general    Transport from OR: Transported from OR on 2-3 L/min O2 by NC with adequate spontaneous ventilation    Post pain: adequate analgesia    Post assessment: no apparent anesthetic complications and tolerated procedure well    Post vital signs: stable    Level of consciousness: awake    Nausea/Vomiting: no nausea/vomiting    Complications: none    Transfer of care protocol was followed      Last vitals:   Visit Vitals  /64   Pulse (!) 56   Temp 36.7 °C (98.1 °F)   Resp 18   Ht 6' (1.829 m)   Wt 128.4 kg (283 lb 1.6 oz)   SpO2 100%   BMI 38.40 kg/m²

## 2019-10-17 NOTE — ANESTHESIA PREPROCEDURE EVALUATION
10/17/2019  Mo Escobar is a 59 y.o., male.    Pre-op Assessment    I have reviewed the Patient Summary Reports.     I have reviewed the Nursing Notes.   I have reviewed the Medications.     Review of Systems  Anesthesia Hx:  No problems with previous Anesthesia    Social:  Former Smoker    Hematology/Oncology:         -- Anemia: Hematology Comments: DVT, on Coumadin, off one week, Bridge with Lovenox   Cardiovascular:   Exercise tolerance: good CHF    Pulmonary:   Shortness of breath Sleep Apnea, CPAP    Renal/:   S/p turp with gross hematuria    Neurological:   Neuromuscular Disease,        Physical Exam  General:  Morbid Obesity    Airway/Jaw/Neck:  Airway Findings: Mouth Opening: Normal Tongue: Normal  General Airway Assessment: Adult  Mallampati: IV  Improves to III with phonation.  Jaw/Neck Findings:  Neck ROM: Normal ROM      Dental:  Dental Findings: In tact   Chest/Lungs:  Chest/Lungs Findings: Clear to auscultation, Normal Respiratory Rate         Mental Status:  Mental Status Findings:  Alert and Oriented, Cooperative         Anesthesia Plan  Type of Anesthesia, risks & benefits discussed:  Anesthesia Type:  general  Patient's Preference:   Intra-op Monitoring Plan: standard ASA monitors  Intra-op Monitoring Plan Comments:   Post Op Pain Control Plan: IV/PO Opioids PRN and multimodal analgesia  Post Op Pain Control Plan Comments:   Induction:   IV  Beta Blocker:  Patient is not currently on a Beta-Blocker (No further documentation required).       Informed Consent: Patient understands risks and agrees with Anesthesia plan.  Questions answered. Anesthesia consent signed with patient.  ASA Score: 3     Day of Surgery Review of History & Physical:    H&P update referred to the surgeon.         Ready For Surgery From Anesthesia Perspective.

## 2019-10-17 NOTE — ANESTHESIA POSTPROCEDURE EVALUATION
Anesthesia Post Evaluation    Patient: Mo Escobar    Procedure(s) Performed: Procedure(s) (LRB):  CYSTOSCOPY (N/A)  REPLACEMENT, CATHETER (N/A)    Final Anesthesia Type: general  Patient location during evaluation: PACU  Patient participation: Yes- Able to Participate  Level of consciousness: awake and alert  Post-procedure vital signs: reviewed and stable  Pain management: adequate  Airway patency: patent  PONV status at discharge: No PONV  Anesthetic complications: no      Cardiovascular status: hemodynamically stable  Respiratory status: unassisted and room air  Hydration status: euvolemic  Follow-up not needed.          Vitals Value Taken Time   /58 10/17/2019  9:32 AM   Temp 36.1 °C (97 °F) 10/17/2019  9:32 AM   Pulse 54 10/17/2019  9:32 AM   Resp 20 10/17/2019  9:32 AM   SpO2 97 % 10/17/2019  9:32 AM         Event Time     Out of Recovery 10/17/2019 09:35:00          Pain/Abigail Score: Pain Rating Prior to Med Admin: 10 (10/17/2019  8:59 AM)  Pain Rating Post Med Admin: 3 (10/17/2019  9:25 AM)  Abigail Score: 10 (10/17/2019  9:25 AM)

## 2019-10-17 NOTE — ASSESSMENT & PLAN NOTE
Acute 2/2 hematuria and pt on anticoag  Transfuse 2 units prbc today -/hem recommendations also  Monitor and transfuse if he becomes hemodynamically unstable or H/H < 7/21  Anticoagulation on hold /hematology consulted -see chronic anticoagulation

## 2019-10-17 NOTE — PLAN OF CARE
Pt is  AAOX4. POC reviewed with pt. Pt verbalized understanding. VSS. Remains afebrile. IVF infusing per order. Pain controlled with PRN medications. Remains free of injury. Bed low, breaks locked, call light in reach. Will monitor.

## 2019-10-17 NOTE — SUBJECTIVE & OBJECTIVE
Interval History:   Pt seen/examined -feels weak  Intermittent bladder spasm   Informed of anemia-need for prbc transfusion     Review of Systems   Constitutional: Positive for fatigue. Negative for chills and fever.   HENT: Negative for congestion and nosebleeds.    Respiratory: Negative for cough and shortness of breath.    Cardiovascular: Negative for chest pain, palpitations and leg swelling.   Gastrointestinal: Negative for blood in stool and constipation.   Genitourinary: Positive for hematuria. Negative for difficulty urinating and dysuria.   Musculoskeletal: Positive for back pain. Negative for arthralgias.   Neurological: Positive for weakness.     Objective:     Vital Signs (Most Recent):  Temp: 98.5 °F (36.9 °C) (10/17/19 0057)  Pulse: 68 (10/17/19 0057)  Resp: 18 (10/17/19 0057)  BP: (!) 119/56 (10/17/19 0057)  SpO2: 97 % (10/17/19 0057) Vital Signs (24h Range):  Temp:  [97.1 °F (36.2 °C)-99.9 °F (37.7 °C)] 98.5 °F (36.9 °C)  Pulse:  [56-68] 68  Resp:  [16-20] 18  SpO2:  [94 %-98 %] 97 %  BP: (107-141)/(52-64) 119/56     Weight: 128.4 kg (283 lb 1.6 oz)  Body mass index is 38.4 kg/m².    Intake/Output Summary (Last 24 hours) at 10/17/2019 0107  Last data filed at 10/16/2019 2059  Gross per 24 hour   Intake 3275 ml   Output 7150 ml   Net -3875 ml      Physical Exam   Constitutional: He is oriented to person, place, and time. He appears well-developed and well-nourished. No distress.   HENT:   Head: Normocephalic and atraumatic.   Right Ear: External ear normal.   Left Ear: External ear normal.   Nose: Nose normal.   Mouth/Throat: Oropharynx is clear and moist. No oropharyngeal exudate.   Eyes: Conjunctivae and EOM are normal. Right eye exhibits no discharge. Left eye exhibits no discharge. No scleral icterus.   Neck: Normal range of motion. Neck supple. No thyromegaly present.   Cardiovascular: Normal rate, regular rhythm, normal heart sounds and intact distal pulses. Exam reveals no gallop and no  friction rub.   No murmur heard.  Pulmonary/Chest: Effort normal and breath sounds normal. No respiratory distress. He has no wheezes.   Abdominal: Soft. Bowel sounds are normal. He exhibits no distension and no mass. There is no tenderness. There is no rebound and no guarding.   Genitourinary:   Genitourinary Comments: Cotter with red  urine -no noted clots    Musculoskeletal: Normal range of motion. He exhibits no edema or tenderness.   Lymphadenopathy:     He has no cervical adenopathy.   Neurological: He is alert and oriented to person, place, and time. No cranial nerve deficit. Coordination normal.   Skin: Skin is warm and dry. Capillary refill takes less than 2 seconds. No rash noted. No erythema.   Psychiatric: He has a normal mood and affect. His behavior is normal. Judgment and thought content normal.   Nursing note and vitals reviewed.      Significant Labs:   BMP:   Recent Labs   Lab 10/16/19  0516     108     142   K 3.9  3.9     110   CO2 26  26   BUN 8  8   CREATININE 0.8  0.8   CALCIUM 8.0*  8.0*     CBC:   Recent Labs   Lab 10/15/19  0910 10/16/19  0516   WBC 6.27 4.38   HGB 8.7* 7.2*   HCT 27.3* 22.8*    230       Significant Imaging: I have reviewed and interpreted all pertinent imaging results/findings within the past 24 hours.

## 2019-10-17 NOTE — PROGRESS NOTES
[facility] Medicine  Progress Note    Patient Name: Mo Escobar  MRN: [id number]  Patient Class: IP- Inpatient   Admission Date: 10/14/2019  Length of Stay: 1 days  Attending Physician: Sandy Mondragon MD  Primary Care Provider: Eleonora Mccabe NP        Subjective:     Principal Problem:Gross hematuria        HPI:  Mr. Escobar is a 59 year old man with a past medical history of thrombophilia with PE and DVTs, hypothyroidism, anemia, and CAIT, bph, morbid obesity, chronic back pain who is 3 weeks s/p 3rd TURP who was directly admitted from DR Mcconnell office for noted gross hematuria. He had a barker catheter placed in ED the evening prior for urinary retention. A 3 way catheter was placed by Dr Mcconnell and bladder irrigation inititated in his office however the patient became sob and diaphoretic so sent for admission, pain control, eval of sob and continued CBI. He had been on lovenox with bridge to coumadin for his coagulopathy and INR in ED was 2.3. All anticoagulation will be held and his hematologist Dr Garrido notified.     At the time of my exam his urine was pink tinged and he had no further pain. He reports there was a clot irrigated and his symptoms resolved with that . He denied fever/chills, chest pain  Or n/v or dysuria.     Overview/Hospital Course:  No notes on file    Interval History:   Pt seen/examined -feels weak  Intermittent bladder spasm   Informed of anemia-need for prbc transfusion     Review of Systems   Constitutional: Positive for fatigue. Negative for chills and fever.   HENT: Negative for congestion and nosebleeds.    Respiratory: Negative for cough and shortness of breath.    Cardiovascular: Negative for chest pain, palpitations and leg swelling.   Gastrointestinal: Negative for blood in stool and constipation.   Genitourinary: Positive for hematuria. Negative for difficulty urinating and dysuria.   Musculoskeletal: Positive for back pain. Negative for  arthralgias.   Neurological: Positive for weakness.     Objective:     Vital Signs (Most Recent):  Temp: 98.5 °F (36.9 °C) (10/17/19 0057)  Pulse: 68 (10/17/19 0057)  Resp: 18 (10/17/19 0057)  BP: (!) 119/56 (10/17/19 0057)  SpO2: 97 % (10/17/19 0057) Vital Signs (24h Range):  Temp:  [97.1 °F (36.2 °C)-99.9 °F (37.7 °C)] 98.5 °F (36.9 °C)  Pulse:  [56-68] 68  Resp:  [16-20] 18  SpO2:  [94 %-98 %] 97 %  BP: (107-141)/(52-64) 119/56     Weight: 128.4 kg (283 lb 1.6 oz)  Body mass index is 38.4 kg/m².    Intake/Output Summary (Last 24 hours) at 10/17/2019 0107  Last data filed at 10/16/2019 2059  Gross per 24 hour   Intake 3275 ml   Output 7150 ml   Net -3875 ml      Physical Exam   Constitutional: He is oriented to person, place, and time. He appears well-developed and well-nourished. No distress.   HENT:   Head: Normocephalic and atraumatic.   Right Ear: External ear normal.   Left Ear: External ear normal.   Nose: Nose normal.   Mouth/Throat: Oropharynx is clear and moist. No oropharyngeal exudate.   Eyes: Conjunctivae and EOM are normal. Right eye exhibits no discharge. Left eye exhibits no discharge. No scleral icterus.   Neck: Normal range of motion. Neck supple. No thyromegaly present.   Cardiovascular: Normal rate, regular rhythm, normal heart sounds and intact distal pulses. Exam reveals no gallop and no friction rub.   No murmur heard.  Pulmonary/Chest: Effort normal and breath sounds normal. No respiratory distress. He has no wheezes.   Abdominal: Soft. Bowel sounds are normal. He exhibits no distension and no mass. There is no tenderness. There is no rebound and no guarding.   Genitourinary:   Genitourinary Comments: Cotter with red  urine -no noted clots    Musculoskeletal: Normal range of motion. He exhibits no edema or tenderness.   Lymphadenopathy:     He has no cervical adenopathy.   Neurological: He is alert and oriented to person, place, and time. No cranial nerve deficit. Coordination normal.   Skin:  Skin is warm and dry. Capillary refill takes less than 2 seconds. No rash noted. No erythema.   Psychiatric: He has a normal mood and affect. His behavior is normal. Judgment and thought content normal.   Nursing note and vitals reviewed.      Significant Labs:   BMP:   Recent Labs   Lab 10/16/19  0516     108     142   K 3.9  3.9     110   CO2 26  26   BUN 8  8   CREATININE 0.8  0.8   CALCIUM 8.0*  8.0*     CBC:   Recent Labs   Lab 10/15/19  0910 10/16/19  0516   WBC 6.27 4.38   HGB 8.7* 7.2*   HCT 27.3* 22.8*    230       Significant Imaging: I have reviewed and interpreted all pertinent imaging results/findings within the past 24 hours.      Assessment/Plan:      * Gross hematuria  Following with urology-3 way/CBI and trending h/h  Bladder us as follows:  Findings suggesting organized hematoma in the bladder around the catheter measuring approximately 3.5 cm.    Anticoagulation on hold -consider cystoscopy per urology   Empiric rocephin   Urine culture obtained in ED 10/13-negative       Chronic anticoagulation  Chronic 2/2 coagulopathy-was on lovenox with bridge to coumadin. -currently both on hold 2/2 gross hematuria post op turp. (3 weeks prior)  Trending INR -down and bleed seems to have resolved   Discussed with urology /hematology -holding anticoagulation in possibility of bleed and need for cysto planned for 10/17 -will resume lovenox post op or on the day if no need for surgery        Acute blood loss anemia  Acute 2/2 hematuria and pt on anticoag  Transfuse 2 units prbc today -/hem recommendations also  Monitor and transfuse if he becomes hemodynamically unstable or H/H < 7/21  Anticoagulation on hold /hematology consulted -see chronic anticoagulation      BMI 40.0-44.9, adult  Morbid obesity complicates all aspects of disease management from diagnostic modalities to treatment. Weight loss encouraged and health benefits explained to patient.        Heterozygous MTHFR  mutation [id number]  See chronic anticoag   CTA chest -neg for Acute PE   Consulted/following with hematology       Chronic bilateral low back pain with sciatica  Chronic stable, continue neuropathic meds/pain control   Consider pt/ot while in house but he is ambulatory     Chronic diastolic heart failure  Mild diastolic dysfunction -on past records-no echo in chart   Monitor fluid status closely-strict I/O to avoid exacerbation   Lasix after prbc transfusion       Neuropathic pain    Chronic continue lyrica /prn pain meds for acute pain -narcotics     Obstructive sleep apnea syndrome  Chronic, stable, continue cpap /oxygen         VTE Risk Mitigation (From admission, onward)         Ordered     Place LOREN hose  Until discontinued      10/14/19 1529     Place sequential compression device  Until discontinued      10/14/19 1529     IP VTE HIGH RISK PATIENT  Once      10/14/19 1529                      Sandy Mondragon MD  Department of Hospital Medicine   [facility]

## 2019-10-18 VITALS
HEIGHT: 72 IN | DIASTOLIC BLOOD PRESSURE: 60 MMHG | WEIGHT: 283.13 LBS | BODY MASS INDEX: 38.35 KG/M2 | RESPIRATION RATE: 18 BRPM | TEMPERATURE: 98 F | HEART RATE: 63 BPM | SYSTOLIC BLOOD PRESSURE: 130 MMHG | OXYGEN SATURATION: 96 %

## 2019-10-18 PROBLEM — D50.9 IRON DEFICIENCY ANEMIA: Status: ACTIVE | Noted: 2019-10-18

## 2019-10-18 LAB
ANION GAP SERPL CALC-SCNC: 8 MMOL/L (ref 8–16)
BASOPHILS # BLD AUTO: 0.04 K/UL (ref 0–0.2)
BASOPHILS NFR BLD: 0.7 % (ref 0–1.9)
BUN SERPL-MCNC: 6 MG/DL (ref 6–20)
CALCIUM SERPL-MCNC: 8.1 MG/DL (ref 8.7–10.5)
CHLORIDE SERPL-SCNC: 108 MMOL/L (ref 95–110)
CO2 SERPL-SCNC: 24 MMOL/L (ref 23–29)
CREAT SERPL-MCNC: 0.8 MG/DL (ref 0.5–1.4)
DIFFERENTIAL METHOD: ABNORMAL
EOSINOPHIL # BLD AUTO: 0.4 K/UL (ref 0–0.5)
EOSINOPHIL NFR BLD: 6.7 % (ref 0–8)
ERYTHROCYTE [DISTWIDTH] IN BLOOD BY AUTOMATED COUNT: 13.8 % (ref 11.5–14.5)
EST. GFR  (AFRICAN AMERICAN): >60 ML/MIN/1.73 M^2
EST. GFR  (NON AFRICAN AMERICAN): >60 ML/MIN/1.73 M^2
GLUCOSE SERPL-MCNC: 126 MG/DL (ref 70–110)
HCT VFR BLD AUTO: 27.3 % (ref 40–54)
HGB BLD-MCNC: 8.7 G/DL (ref 14–18)
IMM GRANULOCYTES # BLD AUTO: 0.03 K/UL (ref 0–0.04)
INR PPP: 1 (ref 0.8–1.2)
LYMPHOCYTES # BLD AUTO: 1.7 K/UL (ref 1–4.8)
LYMPHOCYTES NFR BLD: 28.8 % (ref 18–48)
MCH RBC QN AUTO: 27.2 PG (ref 27–31)
MCHC RBC AUTO-ENTMCNC: 31.9 G/DL (ref 32–36)
MCV RBC AUTO: 85 FL (ref 82–98)
MONOCYTES # BLD AUTO: 0.5 K/UL (ref 0.3–1)
MONOCYTES NFR BLD: 7.5 % (ref 4–15)
NEUTROPHILS # BLD AUTO: 3.4 K/UL (ref 1.8–7.7)
NEUTROPHILS NFR BLD: 55.8 % (ref 38–73)
NRBC BLD-RTO: 0 /100 WBC
PLATELET # BLD AUTO: 240 K/UL (ref 150–350)
PMV BLD AUTO: 10.1 FL (ref 9.2–12.9)
POTASSIUM SERPL-SCNC: 4 MMOL/L (ref 3.5–5.1)
PROTHROMBIN TIME: 10 SEC (ref 9–12.5)
RBC # BLD AUTO: 3.2 M/UL (ref 4.6–6.2)
SODIUM SERPL-SCNC: 140 MMOL/L (ref 136–145)
WBC # BLD AUTO: 6.01 K/UL (ref 3.9–12.7)

## 2019-10-18 PROCEDURE — 25000003 PHARM REV CODE 250: Performed by: UROLOGY

## 2019-10-18 PROCEDURE — A4216 STERILE WATER/SALINE, 10 ML: HCPCS | Performed by: UROLOGY

## 2019-10-18 PROCEDURE — 63600175 PHARM REV CODE 636 W HCPCS: Performed by: INTERNAL MEDICINE

## 2019-10-18 PROCEDURE — 63600175 PHARM REV CODE 636 W HCPCS: Performed by: UROLOGY

## 2019-10-18 PROCEDURE — 85610 PROTHROMBIN TIME: CPT

## 2019-10-18 PROCEDURE — 85025 COMPLETE CBC W/AUTO DIFF WBC: CPT

## 2019-10-18 PROCEDURE — 36415 COLL VENOUS BLD VENIPUNCTURE: CPT

## 2019-10-18 PROCEDURE — 80048 BASIC METABOLIC PNL TOTAL CA: CPT

## 2019-10-18 PROCEDURE — 94761 N-INVAS EAR/PLS OXIMETRY MLT: CPT

## 2019-10-18 PROCEDURE — 25000003 PHARM REV CODE 250: Performed by: HOSPITALIST

## 2019-10-18 RX ORDER — ASCORBIC ACID 500 MG
500 TABLET ORAL DAILY
COMMUNITY
Start: 2019-10-19

## 2019-10-18 RX ORDER — FINASTERIDE 5 MG/1
5 TABLET, FILM COATED ORAL DAILY
Qty: 30 TABLET | Refills: 2 | Status: SHIPPED | OUTPATIENT
Start: 2019-10-19 | End: 2020-10-18

## 2019-10-18 RX ORDER — FERROUS SULFATE 325(65) MG
325 TABLET, DELAYED RELEASE (ENTERIC COATED) ORAL DAILY
Refills: 0 | COMMUNITY
Start: 2019-10-19

## 2019-10-18 RX ORDER — FERROUS SULFATE 325(65) MG
325 TABLET, DELAYED RELEASE (ENTERIC COATED) ORAL DAILY
Status: DISCONTINUED | OUTPATIENT
Start: 2019-10-18 | End: 2019-10-18 | Stop reason: HOSPADM

## 2019-10-18 RX ORDER — OXYCODONE AND ACETAMINOPHEN 5; 325 MG/1; MG/1
1 TABLET ORAL EVERY 6 HOURS PRN
Qty: 10 TABLET | Refills: 0 | Status: SHIPPED | OUTPATIENT
Start: 2019-10-18

## 2019-10-18 RX ORDER — ASCORBIC ACID 500 MG
500 TABLET ORAL DAILY
Status: DISCONTINUED | OUTPATIENT
Start: 2019-10-18 | End: 2019-10-18 | Stop reason: HOSPADM

## 2019-10-18 RX ADMIN — FINASTERIDE 5 MG: 5 TABLET, FILM COATED ORAL at 08:10

## 2019-10-18 RX ADMIN — Medication 3 ML: at 12:10

## 2019-10-18 RX ADMIN — SODIUM CHLORIDE: 0.9 INJECTION, SOLUTION INTRAVENOUS at 07:10

## 2019-10-18 RX ADMIN — SODIUM CHLORIDE: 0.9 INJECTION, SOLUTION INTRAVENOUS at 12:10

## 2019-10-18 RX ADMIN — ENOXAPARIN SODIUM 130 MG: 150 INJECTION SUBCUTANEOUS at 08:10

## 2019-10-18 RX ADMIN — FERROUS SULFATE TAB EC 325 MG (65 MG FE EQUIVALENT) 325 MG: 325 (65 FE) TABLET DELAYED RESPONSE at 12:10

## 2019-10-18 RX ADMIN — DULOXETINE 30 MG: 30 CAPSULE, DELAYED RELEASE ORAL at 08:10

## 2019-10-18 RX ADMIN — HYDROMORPHONE HYDROCHLORIDE 0.5 MG: 2 INJECTION, SOLUTION INTRAMUSCULAR; INTRAVENOUS; SUBCUTANEOUS at 01:10

## 2019-10-18 RX ADMIN — OXYCODONE HYDROCHLORIDE AND ACETAMINOPHEN 500 MG: 500 TABLET ORAL at 12:10

## 2019-10-18 RX ADMIN — LEVOTHYROXINE SODIUM 112 MCG: 112 TABLET ORAL at 08:10

## 2019-10-18 RX ADMIN — SENNOSIDES, DOCUSATE SODIUM 1 TABLET: 50; 8.6 TABLET, FILM COATED ORAL at 08:10

## 2019-10-18 NOTE — PLAN OF CARE
10/18/19 0744   PRE-TX-O2   O2 Device (Oxygen Therapy) room air   SpO2 (!) 93 %   Pulse Oximetry Type Intermittent

## 2019-10-18 NOTE — NURSING
Pt discharged, reviewed discharge instructions. Pt verbalized understanding. IV removed. Pt belongings gathered and taken with pt. Pt escorted off of unit with hospital staff.

## 2019-10-18 NOTE — DISCHARGE INSTRUCTIONS
Thank you for choosing [facility] for your medical care. The primary doctor who is taking care of you at the time of your discharge is Sandy Mondragon MD.     Your were admitted to the hospital with Gross hematuria.     Please note your discharge instructions, including diet/activity restrictions, follow-up appointments, and medication changes.  If you have any questions about your medical issues, prescriptions, or any other questions, please feel free to contact the [facility] Medicine Dept at [phone] and we will help.    Please direct all long term medication refills and follow up to your primary care provider, Eleonora Mccabe NP. Thank you again for letting us take care of your health care needs.

## 2019-10-18 NOTE — PLAN OF CARE
Patient AAO, VSS. Pt verbalized understanding of POC. IVF infusing as ordered.   Cotter catheter patent and draining clear yellow urine. Pain well managed with current pain management plan.  PRN medications utilized. Purposeful hourly/q2hr rounding done during shift to promote patient safety. Patient free from falls and injury during shift.

## 2019-10-18 NOTE — PLAN OF CARE
Attempted to schedule pt's hospital follow up with Dr. Garrido.  Was informed the MD nurse would call pt with the appointment.  Updated the AVS and pt's nurse Lynn.     10/18/19 1110   Discharge Reassessment   Assessment Type Discharge Planning Reassessment   Discharge Plan A Home with family

## 2019-10-18 NOTE — PLAN OF CARE
10/18/19 1146   Final Note   Assessment Type Final Discharge Note   Anticipated Discharge Disposition Home   Hospital Follow Up  Appt(s) scheduled?   (Updated the AVS.)

## 2019-10-21 ENCOUNTER — PATIENT OUTREACH (OUTPATIENT)
Dept: ADMINISTRATIVE | Facility: CLINIC | Age: 59
End: 2019-10-21

## 2019-10-21 ENCOUNTER — TELEPHONE (OUTPATIENT)
Dept: UROLOGY | Facility: CLINIC | Age: 59
End: 2019-10-21

## 2019-10-21 NOTE — PROGRESS NOTES
Please forward this important TCC information to your provider in order to maximize the post discharge care delivery of this patient.    C3 nurse spoke with Mo Escobar for a TCC post hospital discharge follow up call. The patient does not have a scheduled HOSFU appointment with non [facility] PCP within 7-14 days post hospital discharge date 10/18/2019. C3 nurse was unable to schedule HOSFU appointment in [facility].  Please contact pcp and schedule follow up appointment using HOSFU visit type on or before 11/01/2019.    Respectfully,    Brynn Helton LPN    [facility]    [email or web address]       Please do not reply to this message, as this inbox is not routinely monitored.

## 2019-10-21 NOTE — PATIENT INSTRUCTIONS
Blood in the Urine    Blood in the urine (hematuria) has many possible causes. If it occurs after an injury (such as a car accident or fall), it is most often a sign of bruising to the kidney or bladder. Common causes of blood in the urine include urinary tract infections, kidney stones, inflammation, tumors, or certain other diseases of the kidney or bladder. Menstruation can cause blood to appear in the urine sample, although it is not coming from the urinary tract.  If only a trace amount of blood is present, it will show up on the urine test, even though the urine may be yellow and not pink or red. This may occur with any of the above conditions, as well as heavy exercise or high fever. In this case, your doctor may want to repeat the urine test on another day. This will show if the blood is still present. If it is, then other tests can be done to find out the cause.  Home care  Follow these home care guidelines:  · If your urine does not appear bloody (pink, brown or red) then you do not need to restrict your activity in any way.  · If you can see blood in your urine, rest and avoid heavy exertion until your next exam. Do not use aspirin, blood thinners, or anti-platelet or anti-inflammatory medicines. These include ibuprofen and naproxen. These thin the blood and may increase bleeding.  Follow-up care  Follow up with your healthcare provider, or as advised. If you were injured and had blood in your urine, you should have a repeat urine test in 1 to 2 days. Contact your doctor for this test.  A radiologist will review any X-rays that were taken. You will be told of any new findings that may affect your care.  When to seek medical advice  Call your healthcare provider right away if any of these occur:  · Bright red blood or blood clots in the urine (if you did not have this before)  · Weakness, dizziness or fainting  · New groin, abdominal, or back pain  · Fever of 100.4ºF (38ºC) or higher, or as directed by  your healthcare provider  · Repeated vomiting  · Bleeding from the nose or gums or easy bruising  Date Last Reviewed: 9/1/2016  © 0756-4829 [employer]. [address] [place] [address], [place] [address]. All rights reserved. This information is not intended as a substitute for professional medical care. Always follow your healthcare professional's instructions.

## 2019-10-21 NOTE — TELEPHONE ENCOUNTER
----- Message from Elliot Tiwari sent at 10/21/2019  9:02 AM CDT -----  Contact: pt  Type: Needs Medical Advice    Who Called:  pt    Best Call Back Number: [phone]  Additional Information: pt went to [facility] over the weekend for prostate bleeding and has a catheter. the patient is requesting  A call back to see when he needs to make an appt

## 2019-10-21 NOTE — TELEPHONE ENCOUNTER
Patient was not seen in ED this weekend.  wanted to let MD know that he passed scabbing.  Feels like there may be something else still in there/spasms with no urine passing during the spasm.  Otherwise urine is passing into catheter bag clear/yellow.  He is still on Lovenox.   Patient is scheduled to come in tomorrow for catheter removal.

## 2019-10-22 ENCOUNTER — CLINICAL SUPPORT (OUTPATIENT)
Dept: UROLOGY | Facility: CLINIC | Age: 59
End: 2019-10-22
Payer: MEDICARE

## 2019-10-22 DIAGNOSIS — R33.8 CLOT RETENTION OF URINE: Primary | ICD-10-CM

## 2019-10-22 NOTE — TELEPHONE ENCOUNTER
As long as draining well and clear, still ok to remove barker as planned. Just lubricate at meatus before removing, and remove slowly/gently

## 2019-10-22 NOTE — PROGRESS NOTES
Patient here today to have his catheter removed post clot retention.      14ml saline removed from catheter balloon.   Catheter removed with no difficulty.   Leg bag contains 200ml of clear/yellow urine.   Patient instructed to drink 6-8 eight oz glasses on non-cafeinated beverages.  If unable to urinate by 2:30 this afternoon, come back to the clinic to have catheter replaced.

## 2019-10-24 ENCOUNTER — TELEPHONE (OUTPATIENT)
Dept: HEMATOLOGY/ONCOLOGY | Facility: CLINIC | Age: 59
End: 2019-10-24

## 2019-10-24 NOTE — TELEPHONE ENCOUNTER
----- Message from Amanda Eaton sent at 10/24/2019  1:55 PM CDT -----  The patient said he was in the hospital and was taken off his coumadin around 10-10. He is now discharged and is still on Lovenox. He wants to know how long he should stay on the Lovenox and when he can transition back to coumadin. Please call him back at [phone].

## 2019-10-24 NOTE — TELEPHONE ENCOUNTER
Patient instructed to continue Lovenox until he sees Dr Garrido next week, and that Luanne would call him to set that apt up. Patient verbalizes understanding.     ----- Message from Amanda Eaton sent at 10/24/2019  1:55 PM CDT -----  The patient said he was in the hospital and was taken off his coumadin around 10-10. He is now discharged and is still on Lovenox. He wants to know how long he should stay on the Lovenox and when he can transition back to coumadin. Please call him back at [phone].

## 2019-10-27 NOTE — PROGRESS NOTES
[facility] Hematology/Oncology  PROGRESS NOTE      Subjective:       Patient ID:   NAME: Mo Escobar : 1960     59 y.o. male    Referring Doc:  Jose A/Eleonora Renteria  Other Physicians: Travis Best Pinsky    Chief Complaint:  Clot disorder    History of Present Illness:     Patient returns today for a regularly scheduled follow-up visit.  The patient was recently hospitalized at [facility] with hematuria and was scoped by Dr Mcconnell; he was supposed to be on daily lovenox but self-discontinued this past Friday;   No CP,SOB, HA's or N/V. He is here by himself. No hematuria at this time. He is seeing Dr Mcconnell again in near future.   - No excessive bleeding or bruising          ROS:   GEN: normal without any fever, night sweats or weight loss  HEENT: normal with no HA's, sore throat, stiff neck, changes in vision  CV: normal with no CP, SOB, PND, HERRERA or orthopnea  PULM: normal with no SOB, cough, hemoptysis, sputum or pleuritic pain  GI: normal with no abdominal pain, nausea, vomiting, constipation, diarrhea, melanotic stools, BRBPR, or hematemesis  : normal with no hematuria, dysuria  BREAST: normal with no mass, discharge, pain  SKIN: normal with no rash, erythema, bruising, or swelling    Allergies:  Review of patient's allergies indicates:  No Known Allergies    Medications:    Current Outpatient Medications:     acetaminophen (TYLENOL) 650 MG TbSR, Take 1,950 mg by mouth every 8 (eight) hours., Disp: , Rfl:     ascorbic acid, vitamin C, (VITAMIN C) 500 MG tablet, Take 1 tablet (500 mg total) by mouth once daily., Disp: , Rfl:     dextroamphetamine-amphetamine (ADDERALL) 30 mg Tab, Take 30 mg by mouth once daily., Disp: , Rfl:     DULoxetine (CYMBALTA) 30 MG capsule, Take 30 mg by mouth once daily., Disp: , Rfl: 3    enoxaparin (LOVENOX) 150 mg/mL Syrg, Inject 130 mg into the skin every 12 (twelve) hours. , Disp: , Rfl:     ergocalciferol, vitamin D2, (VITAMIN D ORAL), Take by mouth., Disp: , Rfl:      ferrous sulfate 325 (65 FE) MG EC tablet, Take 1 tablet (325 mg total) by mouth once daily., Disp: , Rfl: 0    finasteride (PROSCAR) 5 mg tablet, Take 1 tablet (5 mg total) by mouth once daily., Disp: 30 tablet, Rfl: 2    iron bis-gly/FA/C/B12/Ca/succ (IRON 21/7 ORAL), Take by mouth., Disp: , Rfl:     levothyroxine (SYNTHROID) 112 MCG tablet, Take 112 mcg by mouth once daily., Disp: , Rfl:     lidocaine HCL 2% (XYLOCAINE) 2 % jelly, Apply topically as needed. For barker irritation, Disp: 5 mL, Rfl: 0    oxyCODONE-acetaminophen (PERCOCET) 5-325 mg per tablet, Take 1 tablet by mouth every 6 (six) hours as needed (pain not relieved by otc agents)., Disp: 10 tablet, Rfl: 0  No current facility-administered medications for this visit.     Facility-Administered Medications Ordered in Other Visits:     lidocaine HCl 2% urojet, , Mucous Membrane, Once, Mike Mcconnell MD    PMHx/PSHx Updates:  See patient's last visit with me on  10/17/2019  See H&P on 8/21/2014        Pathology:  Cancer Staging  No matching staging information was found for the patient.          Objective:     Vitals:  Blood pressure (!) 140/77, pulse 64, temperature 98.7 °F (37.1 °C), temperature source Oral, resp. rate 20, weight 130.6 kg (287 lb 14.4 oz).    Physical Examination:   GEN: no apparent distress, comfortable; AAOx3  HEAD: atraumatic and normocephalic  EYES: no pallor, no icterus, PERRLA  ENT: OMM, no pharyngeal erythema, external ears WNL; no nasal discharge; no thrush  NECK: no masses, thyroid normal, trachea midline, no LAD/LN's, supple  CV: RRR with no murmur; normal pulse; normal S1 and S2; no pedal edema  CHEST: Normal respiratory effort; CTAB; normal breath sounds; no wheeze or crackles  ABDOM: nontender and nondistended; soft; normal bowel sounds; no rebound/guarding  MUSC/Skeletal: ROM normal; no crepitus; joints normal; no deformities or arthropathy  EXTREM: no clubbing, cyanosis, inflammation or swelling  SKIN: no  rashes, lesions, ulcers, petechiae or subcutaneous nodules  : no barker  NEURO: grossly intact; motor/sensory WNL; AAOx3; no tremors  PSYCH: normal mood, affect and behavior  LYMPH: normal cervical, supraclavicular, axillary and groin LN's            Labs:     10/18/2019  Lab Results   Component Value Date    WBC 6.01 10/18/2019    HGB 8.7 (L) 10/18/2019    HCT 27.3 (L) 10/18/2019    MCV 85 10/18/2019     10/18/2019     BMP  Lab Results   Component Value Date     10/18/2019    K 4.0 10/18/2019     10/18/2019    CO2 24 10/18/2019    BUN 6 10/18/2019    CREATININE 0.8 10/18/2019    CALCIUM 8.1 (L) 10/18/2019    ANIONGAP 8 10/18/2019    ESTGFRAFRICA >60 10/18/2019    EGFRNONAA >60 10/18/2019             Radiology/Diagnostic Studies:    Ct Abdomen Pelvis  Without Contrast    Result Date: 9/7/2017  CT Abdomen and Pelvis without contrast Comparison: 06/09/2017 Technique:  Helically acquired axial images of the abdomen and pelvis were acquired without contrast.   Reformatted coronal and sagittal images provided. FINDINGS: The liver, gallbladder, pancreas, spleen, and adrenal glands are unremarkable. There is no evidence for nephrolithiasis or hydroureteronephrosis.  A Barker catheter is present within the urinary bladder, which appears thickwalled and with hazy surrounding fat stranding.  No visible thrombus is seen within the bladder.  The prostate gland appears enlarged.  A prominent 9 mm lymph node is present to the right atrium the bladder. A 3 mm nodule is present within the right lower lobe.  Moderate degenerative changes present at L5-S1.    1.  Decompressed ureter bladder with Barker catheter in place without evidence for intraluminal blood clot.  The bladder wall appears thickened and with hazy stranding fat stranding suggesting cystitis. 2.  3 mm right lower lobe nodule.  In the absence of risk factors, no followup indicated.  Otherwise optional chest CT at 12 months may be performed.  Electronically signed by: Liang Ceron MD Date:     09/07/17 Time:    16:19       I have reviewed all available lab results and radiology reports.    Assessment/Plan:   (1) 57 y.o. male with diagnosis of chronic clot disorder with prior bilateral pulmonary emboli  - he has underlying clot disorder with MTHFR-A and MTHFR-C heterozygous condition  - he has been on coumadin long term per direction of Dr Best with cardiology  - INR's on 6/25/2019 was 2.8     (2) Recurrent hematuria issues after getting repeat TURP with Dr Mcconnell  - recently hospitalized at [facility] with hematuria and was scoped by Dr Mcconnell; he was supposed to be on daily lovenox but self-discontinued this past Friday        - he had been previously hospitalized at [facility] and was previously under the care of Dr Sanchez and is now under the care of Dr Mcconnell with   - prior cyctoscope and TURP with Dr Mcconnell; everything looked good per patient  - minor hematuria residual on occasion  - seen by Dr Eduardo in [place] about his prostate but no surgery was recemmended     (3) Recurrent clot event with DVT in the right popliteal vein  - he has been on coumadin per direction of Dr Best     (4) Pulmonary HTN hx - followed by Dr Robles with Pulmonary     (5) Hx/of gout issues    (6) Prior microcytic anemia - resolved - hgb at 8.7    - he is on oral iron; iron was WNL at 37        1. Acute deep vein thrombosis (DVT) of popliteal vein of right lower extremity     2. Chronic anticoagulation     3. Chronic saddle pulmonary embolism without acute cor pulmonale     4. Acute blood loss anemia     5. Anemia due to chronic blood loss     6. Other iron deficiency anemia     7. Microcytic anemia     8. Heterozygous MTHFR mutation [id number]     9. Heterozygous MTHFR mutation [id number]         PLAN:  1. Check labs this week and weekly  2. Resume coumadin today with 6mg po dialy for two days then drop down to his regular dose of 5mg daily thereafter and check INR at end  of week  3. Continue ICAR-C  4. Encouraged compliance  5. Continue coumadin management   6. F/u with     RTC in 4-6 weeks  Fax note Jayesh Renteria Hickey, [place], [place]    Discussion:     I have explained all of the above in detail and the patient understands all of the current recommendation(s). I have answered all of their questions to the best of my ability and to their complete satisfaction.   The patient is to continue with the current management plan.            Electronically signed by Dion Garrido MD

## 2019-10-28 ENCOUNTER — OFFICE VISIT (OUTPATIENT)
Dept: HEMATOLOGY/ONCOLOGY | Facility: CLINIC | Age: 59
End: 2019-10-28
Payer: MEDICARE

## 2019-10-28 VITALS
RESPIRATION RATE: 20 BRPM | WEIGHT: 287.88 LBS | BODY MASS INDEX: 39.05 KG/M2 | DIASTOLIC BLOOD PRESSURE: 77 MMHG | HEART RATE: 64 BPM | TEMPERATURE: 99 F | SYSTOLIC BLOOD PRESSURE: 140 MMHG

## 2019-10-28 DIAGNOSIS — Z15.89 HETEROZYGOUS MTHFR MUTATION C677T: ICD-10-CM

## 2019-10-28 DIAGNOSIS — I27.82 CHRONIC SADDLE PULMONARY EMBOLISM WITHOUT ACUTE COR PULMONALE: ICD-10-CM

## 2019-10-28 DIAGNOSIS — I26.92 CHRONIC SADDLE PULMONARY EMBOLISM WITHOUT ACUTE COR PULMONALE: ICD-10-CM

## 2019-10-28 DIAGNOSIS — D50.9 MICROCYTIC ANEMIA: ICD-10-CM

## 2019-10-28 DIAGNOSIS — D50.8 OTHER IRON DEFICIENCY ANEMIA: ICD-10-CM

## 2019-10-28 DIAGNOSIS — Z79.01 CHRONIC ANTICOAGULATION: ICD-10-CM

## 2019-10-28 DIAGNOSIS — I82.431 ACUTE DEEP VEIN THROMBOSIS (DVT) OF POPLITEAL VEIN OF RIGHT LOWER EXTREMITY: Primary | ICD-10-CM

## 2019-10-28 DIAGNOSIS — D62 ACUTE BLOOD LOSS ANEMIA: ICD-10-CM

## 2019-10-28 DIAGNOSIS — D50.0 ANEMIA DUE TO CHRONIC BLOOD LOSS: ICD-10-CM

## 2019-10-28 DIAGNOSIS — Z15.89 HETEROZYGOUS MTHFR MUTATION A1298C: ICD-10-CM

## 2019-10-28 PROCEDURE — 99213 OFFICE O/P EST LOW 20 MIN: CPT | Mod: S$GLB,,, | Performed by: INTERNAL MEDICINE

## 2019-10-28 PROCEDURE — 99213 PR OFFICE/OUTPT VISIT, EST, LEVL III, 20-29 MIN: ICD-10-PCS | Mod: S$GLB,,, | Performed by: INTERNAL MEDICINE

## 2019-10-29 NOTE — DISCHARGE SUMMARY
[facility] Hospital Medicine  Discharge Summary      Patient Name: Mo Escobar  MRN: [id number]  Admission Date: 10/14/2019  Hospital Length of Stay: 2 days  Discharge Date and Time: 10/18/2019  2:00 PM  Attending Physician: No att. providers found   Discharging Provider: Sandy Mondragon MD  Primary Care Provider: Eleonora Mccabe NP      HPI:   Mr. Escobar is a 59 year old man with a past medical history of thrombophilia with PE and DVTs, hypothyroidism, anemia, and CAIT, bph, morbid obesity, chronic back pain who is 3 weeks s/p 3rd TURP who was directly admitted from DR Mcconnell office for noted gross hematuria. He had a barker catheter placed in ED the evening prior for urinary retention. A 3 way catheter was placed by Dr Mcconnell and bladder irrigation inititated in his office however the patient became sob and diaphoretic so sent for admission, pain control, eval of sob and continued CBI. He had been on lovenox with bridge to coumadin for his coagulopathy and INR in ED was 2.3. All anticoagulation will be held and his hematologist Dr Garrido notified.     At the time of my exam his urine was pink tinged and he had no further pain. He reports there was a clot irrigated and his symptoms resolved with that . He denied fever/chills, chest pain  Or n/v or dysuria.     Procedure(s) (LRB):  CYSTOSCOPY (N/A)  REPLACEMENT, CATHETER (N/A)      Hospital Course:   58 yo admitted for gross hematuria-see hpi for hx. 3way barker placed by Dr Mcconnell in office and CBI initiated, anti-coag held and urology/hematology followed. Required pain meds intermittently. IV rocephin given . Urine culture neg.   Urine remained pink/bloody so cysto done 10/17 and no bleed/clot noted so barker size changed and lovenox resumed.(was off anticoag x 4 days) --needs to bridge to coumadin per hematology. Dr Garrido follows.    Hgb did trend down and he required 2 units prbc transfusion.   Has fu with Dr Garrido and Jayesh       Consults:     No new Assessment & Plan notes have been filed under this hospital service since the last note was generated.  Service: Hospital Medicine    Final Active Diagnoses:    Diagnosis Date Noted POA    PRINCIPAL PROBLEM:  Gross hematuria [R31.0] 06/13/2017 Yes    Iron deficiency anemia [D50.9] 10/18/2019 Yes    Chronic anticoagulation [Z79.01] 10/15/2019 Not Applicable    Acute blood loss anemia [D62] 10/14/2019 Yes    Hematuria [R31.9] 10/14/2019 Yes    BMI 40.0-44.9, adult [Z68.41] 09/15/2017 Not Applicable    Clot retention of urine [R33.8] 09/06/2017 Yes    Heterozygous MTHFR mutation [id number] [E72.12] 06/27/2017 Yes    Chronic bilateral low back pain with sciatica [M54.40, G89.29] 12/13/2016 Yes    Pulmonary hypertension due to left ventricular diastolic dysfunction [I27.22] 12/13/2016 Yes    Chronic diastolic heart failure [I50.32] 11/17/2016 Yes    Obstructive sleep apnea syndrome [G47.33] 11/16/2016 Yes    Neuropathic pain [M79.2] 11/16/2016 Yes    Morbid obesity [E66.01] 11/16/2016 Yes      Problems Resolved During this Admission:       Discharged Condition: good    Disposition: Home or Self Care    Follow Up:  Follow-up Information     Dion Garrido MD.    Specialties:  Hematology, Hematology and Oncology, Oncology  Why:   one to two weeks; MD nurse to call pt with the appointment  Contact information:  [address] [facility]  [address]  [place] [address]  [phone]             Mike Mcconnell MD.    Specialty:  Urology  Why:  MD will schedule-GO TO HIS OFFICE TUESDAY by 0900   Contact information:  [address] [place] [address] [address]  [address]  [place] [address]  [phone]             Eleonora Mccabe NP.    Specialty:  Family Medicine  Why:  As needed  Contact information:  [address] [facility]  [address]  [place] [address]  [phone]                 Patient Instructions:      Diet Adult Regular     Notify your health care provider if you experience any of the following:  temperature >100.4      Notify your health care provider if you experience any of the following:  persistent nausea and vomiting or diarrhea     Notify your health care provider if you experience any of the following:  severe uncontrolled pain     Notify your health care provider if you experience any of the following:  difficulty breathing or increased cough     Notify your health care provider if you experience any of the following:  increased confusion or weakness     Activity as tolerated     Activity as tolerated   Order Comments: Restrictions per Dr Mcconnell       Significant Diagnostic Studies:   Results for SHERRY VANCE (MRN [id number]) as of 10/29/2019 13:11   Ref. Range 10/18/2019 06:51   WBC Latest Ref Range: 3.90 - 12.70 K/uL 6.01   RBC Latest Ref Range: 4.60 - 6.20 M/uL 3.20 (L)   Hemoglobin Latest Ref Range: 14.0 - 18.0 g/dL 8.7 (L)   Hematocrit Latest Ref Range: 40.0 - 54.0 % 27.3 (L)   MCV Latest Ref Range: 82 - 98 fL 85   MCH Latest Ref Range: 27.0 - 31.0 pg 27.2   MCHC Latest Ref Range: 32.0 - 36.0 g/dL 31.9 (L)   RDW Latest Ref Range: 11.5 - 14.5 % 13.8   Platelets Latest Ref Range: 150 - 350 K/uL 240   Results for SHERRY VANCE (MRN [id number]) as of 10/29/2019 13:11   Ref. Range 10/17/2019 04:40 10/18/2019 05:24   Sodium Latest Ref Range: 136 - 145 mmol/L 141 140   Potassium Latest Ref Range: 3.5 - 5.1 mmol/L 3.7 4.0   Chloride Latest Ref Range: 95 - 110 mmol/L 111 (H) 108   CO2 Latest Ref Range: 23 - 29 mmol/L 24 24   Anion Gap Latest Ref Range: 8 - 16 mmol/L 6 (L) 8   BUN, Bld Latest Ref Range: 6 - 20 mg/dL 8 6   Creatinine Latest Ref Range: 0.5 - 1.4 mg/dL 0.8 0.8   eGFR if non African American Latest Ref Range: >60 mL/min/1.73 m^2 >60 >60   eGFR if  Latest Ref Range: >60 mL/min/1.73 m^2 >60 >60   Glucose Latest Ref Range: 70 - 110 mg/dL 105 126 (H)   Calcium Latest Ref Range: 8.7 - 10.5 mg/dL 8.3 (L) 8.1 (L)   Phosphorus Latest Ref Range: 2.7 - 4.5 mg/dL 3.9    Albumin Latest Ref Range: 3.5 - 5.2  g/dL 2.9 (L)          Pending Diagnostic Studies:     None         Medications:  Reconciled Home Medications:      Medication List      START taking these medications    ascorbic acid (vitamin C) 500 MG tablet  Commonly known as:  VITAMIN C  Take 1 tablet (500 mg total) by mouth once daily.     ferrous sulfate 325 (65 FE) MG EC tablet  Take 1 tablet (325 mg total) by mouth once daily.     finasteride 5 mg tablet  Commonly known as:  PROSCAR  Take 1 tablet (5 mg total) by mouth once daily.        CHANGE how you take these medications    ADDERALL 30 mg Tab  Generic drug:  dextroamphetamine-amphetamine  Take 30 mg by mouth once daily.  What changed:  Another medication with the same name was removed. Continue taking this medication, and follow the directions you see here.        CONTINUE taking these medications    acetaminophen 650 MG Tbsr  Commonly known as:  TYLENOL  Take 1,950 mg by mouth every 8 (eight) hours.     DULoxetine 30 MG capsule  Commonly known as:  CYMBALTA  Take 30 mg by mouth once daily.     enoxaparin 150 mg/mL Syrg  Commonly known as:  LOVENOX  Inject 130 mg into the skin every 12 (twelve) hours.     lidocaine HCL 2% 2 % jelly  Commonly known as:  XYLOCAINE  Apply topically as needed. For barker irritation     oxyCODONE-acetaminophen 5-325 mg per tablet  Commonly known as:  PERCOCET  Take 1 tablet by mouth every 6 (six) hours as needed (pain not relieved by otc agents).     SYNTHROID 112 MCG tablet  Generic drug:  levothyroxine  Take 112 mcg by mouth once daily.        STOP taking these medications    warfarin 1 MG tablet  Commonly known as:  COUMADIN     warfarin 5 MG tablet  Commonly known as:  COUMADIN            Indwelling Lines/Drains at time of discharge:   Lines/Drains/Airways     Drain                 Urethral Catheter 09/26/19 1210 Triple-lumen 22 Fr. 33 days         Urethral Catheter 10/17/19 0811 Latex;Coude 22 Fr. 12 days                Time spent on the discharge of patient: 32  minutes  Patient was seen and examined on the date of discharge and determined to be suitable for discharge.         Sandy Mondragon MD  Department of Hospital Medicine  [facility]

## 2019-11-17 ENCOUNTER — TELEPHONE (OUTPATIENT)
Dept: UROLOGY | Facility: CLINIC | Age: 59
End: 2019-11-17

## 2019-12-04 ENCOUNTER — OFFICE VISIT (OUTPATIENT)
Dept: HEMATOLOGY/ONCOLOGY | Facility: CLINIC | Age: 59
End: 2019-12-04
Payer: MEDICARE

## 2019-12-04 VITALS
DIASTOLIC BLOOD PRESSURE: 72 MMHG | RESPIRATION RATE: 19 BRPM | SYSTOLIC BLOOD PRESSURE: 159 MMHG | BODY MASS INDEX: 39.02 KG/M2 | TEMPERATURE: 99 F | WEIGHT: 287.69 LBS | HEART RATE: 60 BPM

## 2019-12-04 DIAGNOSIS — I82.431 ACUTE DEEP VEIN THROMBOSIS (DVT) OF POPLITEAL VEIN OF RIGHT LOWER EXTREMITY: Primary | ICD-10-CM

## 2019-12-04 DIAGNOSIS — D50.8 OTHER IRON DEFICIENCY ANEMIA: ICD-10-CM

## 2019-12-04 DIAGNOSIS — D62 ACUTE BLOOD LOSS ANEMIA: ICD-10-CM

## 2019-12-04 DIAGNOSIS — Z79.01 CHRONIC ANTICOAGULATION: ICD-10-CM

## 2019-12-04 DIAGNOSIS — Z15.89 HETEROZYGOUS MTHFR MUTATION C677T: ICD-10-CM

## 2019-12-04 DIAGNOSIS — I26.92 CHRONIC SADDLE PULMONARY EMBOLISM WITHOUT ACUTE COR PULMONALE: ICD-10-CM

## 2019-12-04 DIAGNOSIS — I27.82 CHRONIC SADDLE PULMONARY EMBOLISM WITHOUT ACUTE COR PULMONALE: ICD-10-CM

## 2019-12-04 DIAGNOSIS — Z15.89 HETEROZYGOUS MTHFR MUTATION A1298C: ICD-10-CM

## 2019-12-04 DIAGNOSIS — D50.0 ANEMIA DUE TO CHRONIC BLOOD LOSS: ICD-10-CM

## 2019-12-04 DIAGNOSIS — D50.9 MICROCYTIC ANEMIA: ICD-10-CM

## 2019-12-04 PROCEDURE — 99213 PR OFFICE/OUTPT VISIT, EST, LEVL III, 20-29 MIN: ICD-10-PCS | Mod: S$GLB,,, | Performed by: INTERNAL MEDICINE

## 2019-12-04 PROCEDURE — 99213 OFFICE O/P EST LOW 20 MIN: CPT | Mod: S$GLB,,, | Performed by: INTERNAL MEDICINE

## 2019-12-30 NOTE — PROGRESS NOTES
[facility] Urology Progress Note    Mo Escobar is a 59 y.o. male who presents for follow up of BPH/recurrent gross hematuria    He is chronically anticoagulated for clotting disorder with history of significant BPH and gross hematuria of prostatic source.    Gross hematuria was intermittent ever since TURP in 2014 by outside MD. Saw him during hospital consultation for gross hematuria and retention in June 2017 after which I performed TURP/laser vaporization on 6/22/17 after negative prostate biopsy (with negative confirm MDx) for 92g gland. 20g resected, including median lobe, and remainder laser vaporized.    Was voiding well for a long time without hematuria which then started to recur intermittently and has been managed with finasteride, though more recently presented in urinary retention 5/20/19 of which urine that drained was clear, but later returned with clot retention per Barker catheter requiring manual irrigation x2 days, and ultimately Barker catheter was removed. Had recurrence of GH and clot retention on planned cystoscope day 7/9/19 and instead had significant manual irrigation to resolve clot retention. Since barker has been out, has been urinating ok but has had intermittent episodes of GH for which he has held coumadin a few days and increased hydration.  Historically episodes of GH had been after activity such as riding mower, boating, etc. Spontaneously recently, though last episode this past weekend was with sons digging ditches. When GH started recurring in May 2019, ucx and cytology negative. 3T MRI had volume 85g. Cystoscopy 8/27/19 found: significant prostatic regrowth with asymmetric ingrowth/regrowth of bilateral lateral lobes and significant ballvalving obstructing intravesical median lobe, seen similar to pre-TURP presentation. Prostatic varices and hypervascularity seen along median lobe and within proximal prostatic urethra. Left ureteral orifice clearly seen. Right obscured by median  lobe. Had consultation re: robotic suprapubic prostatectomy, and after risk benefit discussion elected to proceed with further transurethral management, using laser as well for hemostatic effect. Lovenox bridge arranged which was held for 48 hours preop.      9/26/19 - thulium laser turp (with button electrode vaporization as well for added hemostasis)  10/1/19 barker removed  10/14/19 presented in clot retention - managed conservatively with cbi and bloodthinner hold and urine cleared by 10/17/19 when inr normalized and taken for cystoscopy which found well healing post TURP channel with some barker related edema and no stigmata of active bleeding or areas concerning for bleeding, no residual clot. Barker replaced and dced 1 day later with no recurrence of GH on lovenox.  10/22/19 barker removed - urine yellow at that time    He returns today noting:  After last surgery had some intermittent GH in first 2 months, and would hold coumadin and it would stop  Was light bleeding.  Had some initial urgency and urge incontinence, which has resolved  No visible blood in urine in last month  Has switched to taking his coumadin at night which has seemed to help  AUA SS: 5/1 (3: urgency, 1: emptying, frequency)  Stream is great and flow is great - feels like he can urinate 20 feet which is an improvement from straining  PVR 39cc      ROS: A comprehensive 10 system review was performed and is negative except as noted above in HPI    PHYSICAL EXAM:    Vitals:    01/03/20 0829   BP: 134/78   Pulse: 69     Body mass index is 39.44 kg/m². Weight: 131.9 kg (290 lb 12.6 oz) Height: 6' (182.9 cm)       General: Alert, cooperative, no distress, appears stated age   Head: Normocephalic, without obvious abnormality, atraumatic   Eyes: PERRL, conjunctiva/corneas clear   Lungs: Respirations unlabored   Heart: Warm and well perfused   Abdomen: soft NT ND  Extremities: Extremities normal, atraumatic, no cyanosis or edema   Skin: Skin color,  texture, turgor normal, no rashes or lesions   Psych: Appropriate   Neurologic: Non-focal       Recent Results (from the past 336 hour(s))   POCT URINE DIPSTICK WITHOUT MICROSCOPE    Collection Time: 01/03/20  8:35 AM   Result Value Ref Range    Color, UA shira clear     Spec Grav UA 1.030     pH, UA 5.5     WBC, UA n     Nitrite, UA n     Protein 300     Glucose, UA n     Ketones, UA n     Urobilinogen, UA n     Bilirubin small     Blood, UA moderate        ASSESSMENT   1. BPH without obstruction/lower urinary tract symptoms  POCT URINE DIPSTICK WITHOUT MICROSCOPE    POCT Bladder Scan   2. History of gross hematuria         Plan    At this time, he is voiding and urinating well.  Some of the initial postoperative urgency from TURP is starting to resolve and he no longer has urge incontinence.  He is pleased with his urinary symptoms at this time and feels well.  He has not had any visible blood in the urine in the last month and has been continuing his daily anticoagulation.  Urinalysis continues to show moderate blood on the dipstick which is expected in the setting of his chronic anticoagulation and lower urinary tract procedural history.  Advised him that if any significant gross hematuria, especially with clots presents again, to hold Coumadin and notify our office immediately.  Otherwise, continue to be cautious around heavy activity and strenuous activity especially such as flap on both, riding lawnmower, ATV, etc which place perineal pressure.  He is suitable for annual follow-up at this time, however given his tenuous history, I will have him return for interim evaluation in 6 months with our nurse practitioner, and plan to see him annually.

## 2020-01-03 ENCOUNTER — OFFICE VISIT (OUTPATIENT)
Dept: UROLOGY | Facility: CLINIC | Age: 60
End: 2020-01-03
Payer: MEDICARE

## 2020-01-03 VITALS
DIASTOLIC BLOOD PRESSURE: 78 MMHG | BODY MASS INDEX: 39.39 KG/M2 | SYSTOLIC BLOOD PRESSURE: 134 MMHG | HEIGHT: 72 IN | HEART RATE: 69 BPM | WEIGHT: 290.81 LBS

## 2020-01-03 DIAGNOSIS — N40.0 BPH WITHOUT OBSTRUCTION/LOWER URINARY TRACT SYMPTOMS: Primary | ICD-10-CM

## 2020-01-03 DIAGNOSIS — Z87.898 HISTORY OF GROSS HEMATURIA: ICD-10-CM

## 2020-01-03 LAB
BILIRUB SERPL-MCNC: ABNORMAL MG/DL
BLOOD URINE, POC: ABNORMAL
COLOR, POC UA: ABNORMAL
GLUCOSE UR QL STRIP: ABNORMAL
KETONES UR QL STRIP: ABNORMAL
LEUKOCYTE ESTERASE URINE, POC: ABNORMAL
NITRITE, POC UA: ABNORMAL
PH, POC UA: 5.5
POC RESIDUAL URINE VOLUME: 39 ML (ref 0–100)
PROTEIN, POC: 300
SPECIFIC GRAVITY, POC UA: 1.03
UROBILINOGEN, POC UA: ABNORMAL

## 2020-01-03 PROCEDURE — 51798 US URINE CAPACITY MEASURE: CPT | Mod: PBBFAC,PN | Performed by: UROLOGY

## 2020-01-03 PROCEDURE — 99213 OFFICE O/P EST LOW 20 MIN: CPT | Mod: S$PBB,,, | Performed by: UROLOGY

## 2020-01-03 PROCEDURE — 81002 URINALYSIS NONAUTO W/O SCOPE: CPT | Mod: PBBFAC,PN | Performed by: UROLOGY

## 2020-01-03 PROCEDURE — 99999 PR PBB SHADOW E&M-EST. PATIENT-LVL III: CPT | Mod: PBBFAC,,, | Performed by: UROLOGY

## 2020-01-03 PROCEDURE — 99999 PR PBB SHADOW E&M-EST. PATIENT-LVL III: ICD-10-PCS | Mod: PBBFAC,,, | Performed by: UROLOGY

## 2020-01-03 PROCEDURE — 99213 PR OFFICE/OUTPT VISIT, EST, LEVL III, 20-29 MIN: ICD-10-PCS | Mod: S$PBB,,, | Performed by: UROLOGY

## 2020-01-03 PROCEDURE — 99213 OFFICE O/P EST LOW 20 MIN: CPT | Mod: PBBFAC,PN,25 | Performed by: UROLOGY

## 2020-01-16 ENCOUNTER — TELEPHONE (OUTPATIENT)
Dept: HEMATOLOGY/ONCOLOGY | Facility: CLINIC | Age: 60
End: 2020-01-16

## 2020-01-16 NOTE — TELEPHONE ENCOUNTER
Patient called with his INR of 1.5.  Patient is taking to Coumadin 5 mg.  Instructed the patient to increase his does to 5.5 mg and re-check next week.

## 2020-01-16 NOTE — TELEPHONE ENCOUNTER
----- Message from Amanda Eaton sent at 1/16/2020  2:57 PM CST -----  The patient said his INR is low and he needs instructions on what dosage of medication to take. [phone]

## 2020-01-27 ENCOUNTER — TELEPHONE (OUTPATIENT)
Dept: HEMATOLOGY/ONCOLOGY | Facility: CLINIC | Age: 60
End: 2020-01-27

## 2020-01-27 NOTE — TELEPHONE ENCOUNTER
The patient called with his INR results of 1.6.  Instructed the patient to increase his Coumadin to 6 mg and we will re-check his INR in 1 week.  Called Coumadin 1 mg po daily #30 with 6 refills to Richard Fuentes  @ [phone].

## 2020-01-27 NOTE — TELEPHONE ENCOUNTER
----- Message from Renate Oseguera sent at 1/27/2020  2:22 PM CST -----  Patient called in stating that he received his BW results from [facility] and his  INR is 1.6. He is requesting that the nurse please contact him with his coumadin instructions at [phone]. Thanks

## 2020-03-02 NOTE — PROGRESS NOTES
[facility] Hematology/Oncology  PROGRESS NOTE      Subjective:       Patient ID:   NAME: Mo Escobar : 1960     59 y.o. male    Referring Doc:  Jose A/Eleonora Renteria  Other Physicians: Travis Best Pinsky    Chief Complaint:  Clot disorder    History of Present Illness:     Patient returns today for a regularly scheduled follow-up visit.  He still has some occasional hematuria.    No CP,SOB, HA's or N/V. He is here by himself. He is seeing Dr Mcconnell again in couple months.  No excessive bleeding or bruising . No current hematuria.           ROS:   GEN: normal without any fever, night sweats or weight loss  HEENT: normal with no HA's, sore throat, stiff neck, changes in vision  CV: normal with no CP, SOB, PND, HERRERA or orthopnea  PULM: normal with no SOB, cough, hemoptysis, sputum or pleuritic pain  GI: normal with no abdominal pain, nausea, vomiting, constipation, diarrhea, melanotic stools, BRBPR, or hematemesis  : normal with no hematuria, dysuria  BREAST: normal with no mass, discharge, pain  SKIN: normal with no rash, erythema, bruising, or swelling    Allergies:  Review of patient's allergies indicates:  No Known Allergies    Medications:    Current Outpatient Medications:     acetaminophen (TYLENOL) 650 MG TbSR, Take 1,950 mg by mouth every 8 (eight) hours., Disp: , Rfl:     ascorbic acid, vitamin C, (VITAMIN C) 500 MG tablet, Take 1 tablet (500 mg total) by mouth once daily., Disp: , Rfl:     dextroamphetamine-amphetamine (ADDERALL) 30 mg Tab, Take 30 mg by mouth once daily., Disp: , Rfl:     DULoxetine (CYMBALTA) 30 MG capsule, Take 30 mg by mouth once daily., Disp: , Rfl: 3    enoxaparin (LOVENOX) 150 mg/mL Syrg, Inject 130 mg into the skin every 12 (twelve) hours. , Disp: , Rfl:     ergocalciferol, vitamin D2, (VITAMIN D ORAL), Take by mouth., Disp: , Rfl:     ferrous sulfate 325 (65 FE) MG EC tablet, Take 1 tablet (325 mg total) by mouth once daily., Disp: , Rfl: 0    finasteride  (PROSCAR) 5 mg tablet, Take 1 tablet (5 mg total) by mouth once daily., Disp: 30 tablet, Rfl: 2    iron bis-gly/FA/C/B12/Ca/succ (IRON 21/7 ORAL), Take by mouth., Disp: , Rfl:     levothyroxine (SYNTHROID) 112 MCG tablet, Take 112 mcg by mouth once daily., Disp: , Rfl:     lidocaine HCL 2% (XYLOCAINE) 2 % jelly, Apply topically as needed. For barker irritation, Disp: 5 mL, Rfl: 0    oxyCODONE-acetaminophen (PERCOCET) 5-325 mg per tablet, Take 1 tablet by mouth every 6 (six) hours as needed (pain not relieved by otc agents)., Disp: 10 tablet, Rfl: 0  No current facility-administered medications for this visit.     Facility-Administered Medications Ordered in Other Visits:     lidocaine HCl 2% urojet, , Mucous Membrane, Once, Mike Mcconnell MD    PMHx/PSHx Updates:  See patient's last visit with me on  12/4/2019  See H&P on 8/21/2014        Pathology:  Cancer Staging  No matching staging information was found for the patient.          Objective:     Vitals:  Blood pressure (!) 143/81, pulse (!) 55, temperature 98.6 °F (37 °C), temperature source Oral, resp. rate 19, weight 131.2 kg (289 lb 4.8 oz).    Physical Examination:   GEN: no apparent distress, comfortable; AAOx3  HEAD: atraumatic and normocephalic  EYES: no pallor, no icterus, PERRLA  ENT: OMM, no pharyngeal erythema, external ears WNL; no nasal discharge; no thrush  NECK: no masses, thyroid normal, trachea midline, no LAD/LN's, supple  CV: RRR with no murmur; normal pulse; normal S1 and S2; no pedal edema  CHEST: Normal respiratory effort; CTAB; normal breath sounds; no wheeze or crackles  ABDOM: nontender and nondistended; soft; normal bowel sounds; no rebound/guarding  MUSC/Skeletal: ROM normal; no crepitus; joints normal; no deformities or arthropathy  EXTREM: no clubbing, cyanosis, inflammation or swelling; left thumb with abrasion  SKIN: no rashes, lesions, ulcers, petechiae or subcutaneous nodules  : no barekr  NEURO: grossly intact;  motor/sensory WNL; AAOx3; no tremors  PSYCH: normal mood, affect and behavior  LYMPH: normal cervical, supraclavicular, axillary and groin LN's            Labs:       Today's pending      10/18/2019  Lab Results   Component Value Date    WBC 6.01 10/18/2019    HGB 8.7 (L) 10/18/2019    HCT 27.3 (L) 10/18/2019    MCV 85 10/18/2019     10/18/2019     BMP  Lab Results   Component Value Date     10/18/2019    K 4.0 10/18/2019     10/18/2019    CO2 24 10/18/2019    BUN 6 10/18/2019    CREATININE 0.8 10/18/2019    CALCIUM 8.1 (L) 10/18/2019    ANIONGAP 8 10/18/2019    ESTGFRAFRICA >60 10/18/2019    EGFRNONAA >60 10/18/2019             Radiology/Diagnostic Studies:    Ct Abdomen Pelvis  Without Contrast    Result Date: 9/7/2017  CT Abdomen and Pelvis without contrast Comparison: 06/09/2017 Technique:  Helically acquired axial images of the abdomen and pelvis were acquired without contrast.   Reformatted coronal and sagittal images provided. FINDINGS: The liver, gallbladder, pancreas, spleen, and adrenal glands are unremarkable. There is no evidence for nephrolithiasis or hydroureteronephrosis.  A Cotter catheter is present within the urinary bladder, which appears thickwalled and with hazy surrounding fat stranding.  No visible thrombus is seen within the bladder.  The prostate gland appears enlarged.  A prominent 9 mm lymph node is present to the right atrium the bladder. A 3 mm nodule is present within the right lower lobe.  Moderate degenerative changes present at L5-S1.    1.  Decompressed ureter bladder with Cotter catheter in place without evidence for intraluminal blood clot.  The bladder wall appears thickened and with hazy stranding fat stranding suggesting cystitis. 2.  3 mm right lower lobe nodule.  In the absence of risk factors, no followup indicated.  Otherwise optional chest CT at 12 months may be performed. Electronically signed by: Liang Ceron MD Date:     09/07/17 Time:    16:19        I have reviewed all available lab results and radiology reports.    Assessment/Plan:   (1) 59 y.o. male with diagnosis of chronic clot disorder with prior bilateral pulmonary emboli  - he has underlying clot disorder with MTHFR-A and MTHFR-C heterozygous condition  - he has been on coumadin long term per direction of Dr Best with cardiology  - INR's on 11/21/2019 was 1.0 - he is on 6mg daily and recheck INR is due today       (2) Recurrent hematuria issues after getting repeat TURP with Dr Mcconnell  - previously hospitalized at [facility] with hematuria and was scoped by Dr Mcconnell      - he had been previously hospitalized at [facility] and was previously under the care of Dr Sanchez and is now under the care of Dr Mcconnell with   - prior cyctoscope and TURP with Dr Mcconnell; everything looked good per patient  - minor hematuria residual on occasion  - seen by Dr Eduardo in [place] about his prostate but no surgery was recommended    - he sees Dr Mcconnell again on Jan 3rd 2020     (3) Recurrent clot event with DVT in the right popliteal vein  - he has been on coumadin per direction of Dr Best     (4) Pulmonary HTN hx - followed by Dr Robles with Pulmonary     (5) Hx/of gout issues    (6) Chronic microcytic anemia - resolved - hgb at 12.7  And much better  - he is on oral iron; iron was WNL at 37        1. Chronic saddle pulmonary embolism without acute cor pulmonale     2. Chronic anticoagulation     3. Acute deep vein thrombosis (DVT) of popliteal vein of right lower extremity     4. Other iron deficiency anemia     5. Microcytic anemia     6. Anemia due to chronic blood loss     7. Acute blood loss anemia     8. Borderline diabetes     9. Heterozygous MTHFR mutation [id number]     10. Heterozygous MTHFR mutation [id number]         PLAN:  1. Check labs with INR and basic labs today  2. Continue coumadin at the 6mg dose; encouraged compliance; check INR as directed  3. Continue ICAR-C  4. Encouraged compliance  5. Continue  coumadin management   6. F/u with     RTC in 3 months  Fax note Jayesh Renteria Hickey, [place], [place]    Discussion:     I have explained all of the above in detail and the patient understands all of the current recommendation(s). I have answered all of their questions to the best of my ability and to their complete satisfaction.   The patient is to continue with the current management plan.            Electronically signed by Dion Garrido MD

## 2020-03-03 ENCOUNTER — OFFICE VISIT (OUTPATIENT)
Dept: HEMATOLOGY/ONCOLOGY | Facility: CLINIC | Age: 60
End: 2020-03-03
Payer: MEDICARE

## 2020-03-03 VITALS
RESPIRATION RATE: 19 BRPM | WEIGHT: 289.31 LBS | DIASTOLIC BLOOD PRESSURE: 81 MMHG | SYSTOLIC BLOOD PRESSURE: 143 MMHG | HEART RATE: 55 BPM | BODY MASS INDEX: 39.24 KG/M2 | TEMPERATURE: 99 F

## 2020-03-03 DIAGNOSIS — D62 ACUTE BLOOD LOSS ANEMIA: ICD-10-CM

## 2020-03-03 DIAGNOSIS — Z79.01 CHRONIC ANTICOAGULATION: ICD-10-CM

## 2020-03-03 DIAGNOSIS — Z15.89 HETEROZYGOUS MTHFR MUTATION A1298C: ICD-10-CM

## 2020-03-03 DIAGNOSIS — I26.92 CHRONIC SADDLE PULMONARY EMBOLISM WITHOUT ACUTE COR PULMONALE: Primary | ICD-10-CM

## 2020-03-03 DIAGNOSIS — D50.9 MICROCYTIC ANEMIA: ICD-10-CM

## 2020-03-03 DIAGNOSIS — D50.8 OTHER IRON DEFICIENCY ANEMIA: ICD-10-CM

## 2020-03-03 DIAGNOSIS — I82.431 ACUTE DEEP VEIN THROMBOSIS (DVT) OF POPLITEAL VEIN OF RIGHT LOWER EXTREMITY: ICD-10-CM

## 2020-03-03 DIAGNOSIS — D50.0 ANEMIA DUE TO CHRONIC BLOOD LOSS: ICD-10-CM

## 2020-03-03 DIAGNOSIS — R73.03 BORDERLINE DIABETES: ICD-10-CM

## 2020-03-03 DIAGNOSIS — Z15.89 HETEROZYGOUS MTHFR MUTATION C677T: ICD-10-CM

## 2020-03-03 DIAGNOSIS — I27.82 CHRONIC SADDLE PULMONARY EMBOLISM WITHOUT ACUTE COR PULMONALE: Primary | ICD-10-CM

## 2020-03-03 PROCEDURE — 99213 PR OFFICE/OUTPT VISIT, EST, LEVL III, 20-29 MIN: ICD-10-PCS | Mod: S$GLB,,, | Performed by: INTERNAL MEDICINE

## 2020-03-03 PROCEDURE — 99213 OFFICE O/P EST LOW 20 MIN: CPT | Mod: S$GLB,,, | Performed by: INTERNAL MEDICINE

## 2020-04-11 NOTE — TELEPHONE ENCOUNTER
----- Message from Amanda Saleh sent at 6/5/2017 10:28 AM CDT -----  Patient stated Dr. Best's office is requesting clearance for patient to resume Warfarin medication/if any questions can call their office at [phone]. Patient would like a call back to confirm received message.  
Spoke with patient and Dr. Best's office to inform both of them that Dr. Mcconnell approved for Warfarin to be restarted. Patient verbalized understanding. No further questions voiced.   
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 At triage patient was hypoxic, was placed on O2.  At peds area, patient sat was 88 at room air, hyperventilating, placed back on O2 at 2LNC tolerated well, O2 sat at %.  IV access inserted, labs sent pending results. Will continue to monitor.

## 2020-04-29 ENCOUNTER — TELEPHONE (OUTPATIENT)
Dept: HEMATOLOGY/ONCOLOGY | Facility: CLINIC | Age: 60
End: 2020-04-29

## 2020-04-29 NOTE — TELEPHONE ENCOUNTER
Called the patient and instructed him that his INR is 2.6.  Patient is presently taking Coumadin 6 mg.  Instructed him to continue with present dose and re-check his INR in 1 month.

## 2020-05-20 ENCOUNTER — TELEPHONE (OUTPATIENT)
Dept: HEMATOLOGY/ONCOLOGY | Facility: CLINIC | Age: 60
End: 2020-05-20

## 2020-05-20 NOTE — TELEPHONE ENCOUNTER
Called the patient and instructed him that his INR is 4.8.  I instructed him to hold his Coumadin for 2 days and re-check his INR on Friday.  Instructed him to watch for signs of bleeding.  I instructed him that if he starts to bleed then he needs to go to the ER.   Patient denies any bleeding at present.

## 2020-05-27 ENCOUNTER — TELEPHONE (OUTPATIENT)
Dept: HEMATOLOGY/ONCOLOGY | Facility: CLINIC | Age: 60
End: 2020-05-27

## 2020-05-28 NOTE — TELEPHONE ENCOUNTER
Patient returned my call.  Instructed the patient to increase his Coumadin from 6 mg to 6.5 mg and re-check in 1 week.

## 2020-06-04 ENCOUNTER — TELEPHONE (OUTPATIENT)
Dept: HEMATOLOGY/ONCOLOGY | Facility: CLINIC | Age: 60
End: 2020-06-04

## 2020-06-04 NOTE — TELEPHONE ENCOUNTER
Called the patient and instructed him that his INR is 4.3.  I instructed him to hold his Coumadin until Monday and re-check it on Monday.

## 2020-06-22 ENCOUNTER — TELEPHONE (OUTPATIENT)
Dept: HEMATOLOGY/ONCOLOGY | Facility: CLINIC | Age: 60
End: 2020-06-22

## 2020-06-22 NOTE — TELEPHONE ENCOUNTER
Called the patient and instructed him that his INR is 2.8.  Patient is on 6 mg of Coumadin.  I instructed him to continue with the 6 mg and re-check his INR in 1 week.

## 2020-07-09 ENCOUNTER — TELEPHONE (OUTPATIENT)
Dept: HEMATOLOGY/ONCOLOGY | Facility: CLINIC | Age: 60
End: 2020-07-09

## 2020-07-09 NOTE — TELEPHONE ENCOUNTER
Called the patient and instructed him that his INR is 4.2.  I instructed the patient to hold his Coumadin for the week-end and re-check his INR on Monday.  I will call him with the results.

## 2020-07-27 ENCOUNTER — TELEPHONE (OUTPATIENT)
Dept: HEMATOLOGY/ONCOLOGY | Facility: CLINIC | Age: 60
End: 2020-07-27

## 2020-07-27 NOTE — TELEPHONE ENCOUNTER
Called the patient and instructed him that his INR is 2.9.  The patient is on Coumadin 6 mg.  I instructed him to continue with the 6 mg and re-check his INR in 1 week.  Patient verbalized understanding.

## 2020-08-11 ENCOUNTER — TELEPHONE (OUTPATIENT)
Dept: HEMATOLOGY/ONCOLOGY | Facility: CLINIC | Age: 60
End: 2020-08-11

## 2020-09-18 ENCOUNTER — TELEPHONE (OUTPATIENT)
Dept: HEMATOLOGY/ONCOLOGY | Facility: CLINIC | Age: 60
End: 2020-09-18

## 2020-09-18 NOTE — TELEPHONE ENCOUNTER
Patient returned my call and I instructed him that his INR 2.9.  Patient is taking 5 mg of Coumadin.  Instructed him to continue taking the 5 mg and re-check in 1 month.

## 2020-11-12 ENCOUNTER — TELEPHONE (OUTPATIENT)
Dept: HEMATOLOGY/ONCOLOGY | Facility: CLINIC | Age: 60
End: 2020-11-12

## 2020-11-13 NOTE — TELEPHONE ENCOUNTER
Called the patient and instructed him on his INR of 2.2.  Patient is taking 5 mg of Coumadin.  I instructed him to continue taking the 5 mg and re-check it in 1 month.

## 2021-06-20 DIAGNOSIS — Z79.01 CHRONIC ANTICOAGULATION: ICD-10-CM

## 2021-06-21 RX ORDER — WARFARIN SODIUM 5 MG/1
TABLET ORAL
Qty: 30 TABLET | Refills: 1 | Status: SHIPPED | OUTPATIENT
Start: 2021-06-21

## 2025-03-20 ENCOUNTER — HOSPITAL ENCOUNTER (EMERGENCY)
Facility: HOSPITAL | Age: 65
Discharge: HOME | End: 2025-03-20
Attending: EMERGENCY MEDICINE
Payer: MEDICARE

## 2025-03-20 VITALS
HEART RATE: 78 BPM | RESPIRATION RATE: 20 BRPM | DIASTOLIC BLOOD PRESSURE: 72 MMHG | OXYGEN SATURATION: 96 % | BODY MASS INDEX: 37.25 KG/M2 | WEIGHT: 275 LBS | TEMPERATURE: 99 F | HEIGHT: 72 IN | SYSTOLIC BLOOD PRESSURE: 147 MMHG

## 2025-03-20 DIAGNOSIS — N50.89 EPIDERMOID CYST OF TESTIS: ICD-10-CM

## 2025-03-20 DIAGNOSIS — Z13.6 SCREENING FOR CARDIOVASCULAR CONDITION: ICD-10-CM

## 2025-03-20 DIAGNOSIS — R31.9 URINARY TRACT INFECTION WITH HEMATURIA, SITE UNSPECIFIED: ICD-10-CM

## 2025-03-20 DIAGNOSIS — N39.0 URINARY TRACT INFECTION WITH HEMATURIA, SITE UNSPECIFIED: ICD-10-CM

## 2025-03-20 DIAGNOSIS — N45.3 ACUTE EPIDIDYMO-ORCHITIS: Primary | ICD-10-CM

## 2025-03-20 DIAGNOSIS — N43.3 HYDROCELE, BILATERAL: ICD-10-CM

## 2025-03-20 DIAGNOSIS — N50.811 TESTICULAR PAIN, RIGHT: ICD-10-CM

## 2025-03-20 LAB
ALBUMIN SERPL BCP-MCNC: 3.4 G/DL (ref 3.5–5.2)
ALP SERPL-CCNC: 102 U/L (ref 40–150)
ALT SERPL W/O P-5'-P-CCNC: 43 U/L (ref 10–44)
ANION GAP SERPL CALC-SCNC: 10 MMOL/L (ref 8–16)
AST SERPL-CCNC: 26 U/L (ref 10–40)
BACTERIA #/AREA URNS HPF: ABNORMAL /HPF
BASOPHILS # BLD AUTO: ABNORMAL K/UL (ref 0–0.2)
BASOPHILS NFR BLD: 0 % (ref 0–1.9)
BILIRUB SERPL-MCNC: 1.3 MG/DL (ref 0.1–1)
BILIRUB UR QL STRIP: NEGATIVE
BUN SERPL-MCNC: 13 MG/DL (ref 8–23)
CALCIUM SERPL-MCNC: 9.5 MG/DL (ref 8.7–10.5)
CHLORIDE SERPL-SCNC: 102 MMOL/L (ref 95–110)
CLARITY UR: ABNORMAL
CO2 SERPL-SCNC: 22 MMOL/L (ref 23–29)
COLOR UR: YELLOW
CREAT SERPL-MCNC: 0.9 MG/DL (ref 0.5–1.4)
DIFFERENTIAL METHOD BLD: ABNORMAL
EOSINOPHIL # BLD AUTO: ABNORMAL K/UL (ref 0–0.5)
EOSINOPHIL NFR BLD: 1 % (ref 0–8)
ERYTHROCYTE [DISTWIDTH] IN BLOOD BY AUTOMATED COUNT: 13.1 % (ref 11.5–14.5)
EST. GFR  (NO RACE VARIABLE): >60 ML/MIN/1.73 M^2
GLUCOSE SERPL-MCNC: 113 MG/DL (ref 70–110)
GLUCOSE UR QL STRIP: NEGATIVE
HCT VFR BLD AUTO: 42.7 % (ref 40–54)
HGB BLD-MCNC: 14.3 G/DL (ref 14–18)
HGB UR QL STRIP: ABNORMAL
HYALINE CASTS #/AREA URNS LPF: 0 /LPF
IMM GRANULOCYTES # BLD AUTO: ABNORMAL K/UL (ref 0–0.04)
IMM GRANULOCYTES NFR BLD AUTO: ABNORMAL % (ref 0–0.5)
INR PPP: 1.2 (ref 0.8–1.2)
KETONES UR QL STRIP: ABNORMAL
LACTATE SERPL-SCNC: 1.2 MMOL/L (ref 0.5–2.2)
LEUKOCYTE ESTERASE UR QL STRIP: ABNORMAL
LYMPHOCYTES # BLD AUTO: ABNORMAL K/UL (ref 1–4.8)
LYMPHOCYTES NFR BLD: 9 % (ref 18–48)
MCH RBC QN AUTO: 29.3 PG (ref 27–31)
MCHC RBC AUTO-ENTMCNC: 33.5 G/DL (ref 32–36)
MCV RBC AUTO: 88 FL (ref 82–98)
MICROSCOPIC COMMENT: ABNORMAL
MONOCYTES # BLD AUTO: ABNORMAL K/UL (ref 0.3–1)
MONOCYTES NFR BLD: 9 % (ref 4–15)
NEUTROPHILS NFR BLD: 81 % (ref 38–73)
NITRITE UR QL STRIP: NEGATIVE
NRBC BLD-RTO: 0 /100 WBC
PH UR STRIP: 6 [PH] (ref 5–8)
PLATELET # BLD AUTO: 207 K/UL (ref 150–450)
PLATELET BLD QL SMEAR: ABNORMAL
PMV BLD AUTO: 9.8 FL (ref 9.2–12.9)
POTASSIUM SERPL-SCNC: 3.8 MMOL/L (ref 3.5–5.1)
PROT SERPL-MCNC: 8 G/DL (ref 6–8.4)
PROT UR QL STRIP: ABNORMAL
PROTHROMBIN TIME: 13.5 SEC (ref 9–12.5)
RBC # BLD AUTO: 4.88 M/UL (ref 4.6–6.2)
RBC #/AREA URNS HPF: >100 /HPF (ref 0–4)
SODIUM SERPL-SCNC: 134 MMOL/L (ref 136–145)
SP GR UR STRIP: 1.03 (ref 1–1.03)
SQUAMOUS #/AREA URNS HPF: 1 /HPF
UNIDENT CRYS URNS QL MICRO: 13
URN SPEC COLLECT METH UR: ABNORMAL
UROBILINOGEN UR STRIP-ACNC: ABNORMAL EU/DL
WBC # BLD AUTO: 9.89 K/UL (ref 3.9–12.7)
WBC #/AREA URNS HPF: >100 /HPF (ref 0–5)
WBC CLUMPS URNS QL MICRO: ABNORMAL
YEAST URNS QL MICRO: ABNORMAL

## 2025-03-20 PROCEDURE — 83605 ASSAY OF LACTIC ACID: CPT

## 2025-03-20 PROCEDURE — 93005 ELECTROCARDIOGRAM TRACING: CPT

## 2025-03-20 PROCEDURE — 85610 PROTHROMBIN TIME: CPT

## 2025-03-20 PROCEDURE — 87491 CHLMYD TRACH DNA AMP PROBE: CPT

## 2025-03-20 PROCEDURE — 36415 COLL VENOUS BLD VENIPUNCTURE: CPT

## 2025-03-20 PROCEDURE — 99285 EMERGENCY DEPT VISIT HI MDM: CPT | Mod: 25

## 2025-03-20 PROCEDURE — 96374 THER/PROPH/DIAG INJ IV PUSH: CPT

## 2025-03-20 PROCEDURE — 80053 COMPREHEN METABOLIC PANEL: CPT

## 2025-03-20 PROCEDURE — 87086 URINE CULTURE/COLONY COUNT: CPT

## 2025-03-20 PROCEDURE — 63600175 PHARM REV CODE 636 W HCPCS

## 2025-03-20 PROCEDURE — 93010 ELECTROCARDIOGRAM REPORT: CPT | Mod: ,,, | Performed by: GENERAL PRACTICE

## 2025-03-20 PROCEDURE — 85025 COMPLETE CBC W/AUTO DIFF WBC: CPT

## 2025-03-20 PROCEDURE — 81000 URINALYSIS NONAUTO W/SCOPE: CPT

## 2025-03-20 PROCEDURE — 87186 SC STD MICRODIL/AGAR DIL: CPT

## 2025-03-20 PROCEDURE — 96375 TX/PRO/DX INJ NEW DRUG ADDON: CPT

## 2025-03-20 RX ORDER — LEVOFLOXACIN 500 MG/1
500 TABLET, FILM COATED ORAL DAILY
Qty: 10 TABLET | Refills: 0 | Status: SHIPPED | OUTPATIENT
Start: 2025-03-20 | End: 2025-03-30

## 2025-03-20 RX ORDER — TRAMADOL HYDROCHLORIDE 50 MG/1
50 TABLET ORAL EVERY 6 HOURS PRN
Qty: 12 TABLET | Refills: 0 | Status: SHIPPED | OUTPATIENT
Start: 2025-03-20

## 2025-03-20 RX ORDER — CEFTRIAXONE 1 G/1
1 INJECTION, POWDER, FOR SOLUTION INTRAMUSCULAR; INTRAVENOUS ONCE
Status: COMPLETED | OUTPATIENT
Start: 2025-03-20 | End: 2025-03-20

## 2025-03-20 RX ORDER — MORPHINE SULFATE 4 MG/ML
4 INJECTION, SOLUTION INTRAMUSCULAR; INTRAVENOUS
Refills: 0 | Status: COMPLETED | OUTPATIENT
Start: 2025-03-20 | End: 2025-03-20

## 2025-03-20 RX ADMIN — MORPHINE SULFATE 4 MG: 4 INJECTION INTRAVENOUS at 12:03

## 2025-03-20 RX ADMIN — CEFTRIAXONE SODIUM 1 G: 1 INJECTION, POWDER, FOR SOLUTION INTRAMUSCULAR; INTRAVENOUS at 01:03

## 2025-03-20 NOTE — ED PROVIDER NOTES
Encounter Date: 3/20/2025       History     Chief Complaint   Patient presents with    Testicle Pain     Right testicular pain and scrotal edema x4 days.     65-year-old man with a past medical history of DVT and PE on warfarin, sleep apnea, and BPH presents to ED for right testicular pain.  Patient states it started on Monday and it has progressively gotten worse.  Complaining of intermittent sharp 5/10 pain.  Tylenol with no relief.  Movement and touch makes it worse.  Denies any trauma.  Denies this ever happening before.  Patient also admits to a fever of 100, body aches, sweats, chills, right testicular swelling and erythema.  Denies cough, congestion, runny nose, sore throat, abdominal pain, nausea, vomiting, diarrhea, constipation, urinary symptoms, penile discharge, penile pain, headaches, dizziness, lightheadedness.      Review of patient's allergies indicates:  No Known Allergies  Past Medical History:   Diagnosis Date    Acute deep vein thrombosis (DVT) of popliteal vein of right lower extremity 6/27/2017    Anemia due to chronic blood loss 10/3/2017    Anemia due to chronic blood loss 10/3/2017    Anticoagulant long-term use     Blood clotting disorder     Dr. Garrido    DVT (deep venous thrombosis)     Cotter catheter in place     Heterozygous MTHFR mutation [id number] 6/27/2017    Heterozygous MTHFR mutation [id number] 6/27/2017    Microcytic anemia 10/3/2017    Pulmonary emboli     Pulmonary embolism     2014  - Due to clotting disorder    Sleep apnea     uses c-pap    Thyroid disease      Past Surgical History:   Procedure Laterality Date    BACK SURGERY  1989    CARDIAC CATHETERIZATION      2014    CYSTOSCOPY N/A 8/27/2019    Procedure: CYSTOSCOPY;  Surgeon: Mike Mcconnell MD;  Location: [facility] OR;  Service: Urology;  Laterality: N/A;    CYSTOSCOPY N/A 10/17/2019    Procedure: CYSTOSCOPY;  Surgeon: Mike Mcconnell MD;  Location: [facility] OR;  Service: Urology;  Laterality: N/A;    HAND TENDON SURGERY  1987     IRRIGATION OF BLADDER N/A 7/9/2019    Procedure: IRRIGATION, BLADDER;  Surgeon: Mike Mcconnell MD;  Location: Replaced by [facility] OR;  Service: Urology;  Laterality: N/A;    REPLACEMENT OF CATHETER N/A 10/17/2019    Procedure: REPLACEMENT, CATHETER;  Surgeon: Mike Mcconnell MD;  Location: [facility] OR;  Service: Urology;  Laterality: N/A;    TRANSURETHRAL RESECTION OF PROSTATE N/A 9/26/2019    Procedure: TURP (TRANSURETHRAL RESECTION OF PROSTATE);  Surgeon: Mike Mcconnell MD;  Location: [facility] OR;  Service: Urology;  Laterality: N/A;    TRANSURETHRAL RESECTION OF PROSTATE (TURP) USING THULIUM LASER N/A 9/26/2019    Procedure: TURP, USING THULIUM LASER / quanta laser;  Surgeon: Mike Mcconnell MD;  Location: [facility] OR;  Service: Urology;  Laterality: N/A;  QUANTA LASER; [id number] confirmed with Kelsie at [facility]/prostate laser 9/5 330pm -- estimated case time 9am -- WILL NEED TO CONFIRM/UPDATE TIME CLOSER TO     Family History   Problem Relation Name Age of Onset    Cancer Mother      No Known Problems Father       Social History[1]  Review of Systems   Constitutional:  Positive for chills, diaphoresis and fever.   HENT:  Negative for congestion, rhinorrhea and sore throat.    Respiratory:  Negative for cough and shortness of breath.    Cardiovascular:  Negative for chest pain.   Gastrointestinal:  Negative for abdominal pain, constipation, diarrhea, nausea and vomiting.   Genitourinary:  Positive for scrotal swelling and testicular pain (right). Negative for decreased urine volume, difficulty urinating, dysuria, frequency, genital sores, hematuria, penile discharge, penile pain, penile swelling and urgency.   Musculoskeletal:  Positive for myalgias (generalized body aches).   Skin:  Negative for rash.   Neurological:  Negative for dizziness, weakness, light-headedness and headaches.       Physical Exam     Initial Vitals   BP Pulse Resp Temp SpO2   03/20/25 1129 03/20/25 1129 03/20/25 1129 03/20/25 1129 03/20/25 1132    (!) 143/70 97 18 98.5 °F (36.9 °C) 97 %      MAP       --                Physical Exam    Nursing note and vitals reviewed.  Constitutional: He appears well-developed and well-nourished. He is not diaphoretic. He is active. He does not appear ill. No distress.   HENT:   Head: Normocephalic and atraumatic.   Right Ear: External ear normal.   Left Ear: External ear normal.   Nose: Nose normal.   Eyes: Lids are normal. Pupils are equal, round, and reactive to light. Right eye exhibits no discharge. Left eye exhibits no discharge. No scleral icterus.   Neck: Phonation normal. Neck supple.   Normal range of motion.   Full passive range of motion without pain.     Pulmonary/Chest: Effort normal. No respiratory distress.   Abdominal: Abdomen is soft. He exhibits no distension. There is no abdominal tenderness. Hernia confirmed negative in the right inguinal area and confirmed negative in the left inguinal area.   Genitourinary:    Penis normal.   Right testis shows swelling and tenderness. Right testis shows no mass. Right testis is descended. Cremasteric reflex is not absent on the right side. Left testis shows tenderness. Left testis shows no mass and no swelling. Left testis is descended. Cremasteric reflex is not absent on the left side.    Genitourinary Comments: No perineum erythema, warmth, or swelling. No hernias appreciated. Right testicular ttp, swelling, and erythema. No mass or abscess appreciated. Normal penis exam.    Nurse chaperone     Musculoskeletal:         General: Normal range of motion.      Cervical back: Full passive range of motion without pain, normal range of motion and neck supple.     Lymphadenopathy: No inguinal adenopathy noted on the right or left side.   Neurological: He is alert and oriented to person, place, and time. He has normal strength. No sensory deficit. GCS eye subscore is 4. GCS verbal subscore is 5. GCS motor subscore is 6.   Skin: Skin is dry. Capillary refill takes less than  2 seconds.         ED Course   Procedures  Labs Reviewed   URINALYSIS, REFLEX TO URINE CULTURE - Abnormal       Result Value    Specimen UA Urine, Clean Catch      Color, UA Yellow      Appearance, UA Hazy (*)     pH, UA 6.0      Specific Gravity, UA 1.030      Protein, UA 2+ (*)     Glucose, UA Negative      Ketones, UA 2+ (*)     Bilirubin (UA) Negative      Occult Blood UA 3+ (*)     Nitrite, UA Negative      Urobilinogen, UA 2.0-3.0 (*)     Leukocytes, UA 3+ (*)     Narrative:     Specimen Source->Urine   CBC W/ AUTO DIFFERENTIAL - Abnormal    WBC 9.89      RBC 4.88      Hemoglobin 14.3      Hematocrit 42.7      MCV 88      MCH 29.3      MCHC 33.5      RDW 13.1      Platelets 207      MPV 9.8      Immature Granulocytes CANCELED      Immature Grans (Abs) CANCELED      Lymph # CANCELED      Mono # CANCELED      Eos # CANCELED      Baso # CANCELED      nRBC 0      Gran % 81.0 (*)     Lymph % 9.0 (*)     Mono % 9.0      Eosinophil % 1.0      Basophil % 0.0      Platelet Estimate Appears normal      Differential Method Automated     COMPREHENSIVE METABOLIC PANEL - Abnormal    Sodium 134 (*)     Potassium 3.8      Chloride 102      CO2 22 (*)     Glucose 113 (*)     BUN 13      Creatinine 0.9      Calcium 9.5      Total Protein 8.0      Albumin 3.4 (*)     Total Bilirubin 1.3 (*)     Alkaline Phosphatase 102      AST 26      ALT 43      eGFR >60      Anion Gap 10     PROTIME-INR - Abnormal    Prothrombin Time 13.5 (*)     INR 1.2     URINALYSIS MICROSCOPIC - Abnormal    RBC, UA >100 (*)     WBC, UA >100 (*)     WBC Clumps, UA Few (*)     Bacteria Moderate (*)     Yeast, UA Moderate (*)     Squam Epithel, UA 1      Hyaline Casts, UA 0      Unclass Ana UA 13      Microscopic Comment SEE COMMENT      Narrative:     Specimen Source->Urine   CULTURE, URINE   LACTIC ACID, PLASMA    Lactate (Lactic Acid) 1.2     C. TRACHOMATIS/N. GONORRHOEAE BY AMP DNA          Imaging Results              US SCROTUM AND TESTICLES WITH  DOPPLER (XPD) (Final result)  Result time 03/20/25 12:53:25   Procedure changed from US Scrotum And Testicles     Final result by Dereck Wilcox Jr., MD (03/20/25 12:53:25)                   Impression:      Increased vascularity of the right testicle and epididymis suggesting epididymo-orchitis.  A septated small right hydrocele is noted and a varicocele is seen.  A 8 mm cyst is seen in the right epididymis.    On the left there is a small hydrocele and a 1.8 cm cyst in the epididymis.      Electronically signed by: Dereck Wilcox MD  Date:    03/20/2025  Time:    12:53               Narrative:    EXAMINATION:  Scrotal ultrasound    CLINICAL HISTORY:  Scrotal swelling and right-sided pain.    TECHNIQUE:  Sagittal and transverse images are obtained through both sides of the scrotum.  Doppler ultrasound is performed of the vessels.    COMPARISON:  None    FINDINGS:  On the right the testicle measures 4.9 x 3.3 x 3.3 cm.  There is homogeneous echogenicity but increased vascular flow within the testicular parenchyma and within the epididymis.  There is a a a small hydrocele with multiple septations.  A 8 mm cyst is seen in the head of the epididymis.  A varicocele is noted.    On the left the testicle measures 5.1 x 2.8 x 2.9 cm.  There is normal vascular flow without a mass or cyst of the testicular parenchyma.  A 1.8 cm cyst is noted in the epididymis and there is a small amount of hydrocele fluid.                                       Medications   morphine injection 4 mg (4 mg Intravenous Given 3/20/25 1200)   cefTRIAXone injection 1 g (1 g Intravenous Given 3/20/25 1321)     Medical Decision Making  65-year-old man with a past medical history of DVT and PE on warfarin, sleep apnea, and BPH presents to ED for right testicular pain  Patient's chart and medical history reviewed.    Ddx:  UTI  Gonorrhea  Chlamydia  Epididymitis   Testicular torsion   Hydrocele  Variocele  Hernia    Patient's vitals reviewed.   Afebrile, no respiratory distress, and nontoxic-appearing in the ED. Patient had No perineum erythema, warmth, or swelling. No hernias appreciated. Right testicular ttp, swelling, and erythema. No mass or abscess appreciated. Normal penis exam. Nurse chaperone.  Discussed this case with Dr. Hernandez.  Patient given a dose of morphine for pain. INR is 1.2; not in therapeutic range for coumadin. CBC unremarkable.  CMP overall unremarkable. Lactic is 1.2.  UA remarkable for infection and blood. US showed Increased vascularity of the right testicle and epididymis suggesting epididymo-orchitis.  A septated small right hydrocele is noted and a varicocele is seen.  A 8 mm cyst is seen in the right epididymis. On the left there is a small hydrocele and a 1.8 cm cyst in the epididymis. Patient given rocephin in ED. EKG obtained for evaluation of QTC for levofloxacin abx therapy. QTC is 401.  Adverse effects discussed with the patient involving Levaquin, he verbalized understanding.  Wife states he has use Cipro and Levaquin in the past.  Discussed all results with patient including incidental cyst and hydroceles as well as acute epididymitis orchitis, he verbalized understanding.  Discussed with patient to rest, ice, and elevate scrotum as needed for symptomatic control.  Patient will also be sent home with short course of tramadol for pain control. I have reviewed the  for this patient. Patient will follow-up with his urologist Dr. Mcconnell. Patient agrees with this plan. Discussed with him strict return precautions, he verbalized understanding. Patient is stable for discharge.     Amount and/or Complexity of Data Reviewed  External Data Reviewed: labs, radiology and notes.  Labs: ordered.  Radiology: ordered.  ECG/medicine tests: ordered.    Risk  Prescription drug management.                                      Clinical Impression:  Final diagnoses:  [N50.811] Testicular pain, right  [N45.3] Acute epididymo-orchitis  (Primary)  [N43.3] Hydrocele, bilateral  [N50.89] Epidermoid cyst of testis  [Z13.6] Screening for cardiovascular condition  [N39.0, R31.9] Urinary tract infection with hematuria, site unspecified          ED Disposition Condition    Discharge Stable          ED Prescriptions       Medication Sig Dispense Start Date End Date Auth. Provider    levoFLOXacin (LEVAQUIN) 500 MG tablet Take 1 tablet (500 mg total) by mouth once daily. for 10 days 10 tablet 3/20/2025 3/30/2025 Holdsworth, Alayna, PA-C    traMADoL (ULTRAM) 50 mg tablet Take 1 tablet (50 mg total) by mouth every 6 (six) hours as needed for Pain. 12 tablet 3/20/2025 -- Holdsworth, Alayna, PA-C          Follow-up Information       Follow up With Specialties Details Why Contact Info    Mike Mcconnell MD Urology Call   [address] [place] [address] [address]  [facility] [address]  [phone]                   [1]   Social History  Tobacco Use    Smoking status: Former     Current packs/day: 0.00     Average packs/day: 1 pack/day for 7.0 years (7.0 ttl pk-yrs)     Types: Cigarettes     Start date:      Quit date:      Years since quittin.2    Smokeless tobacco: Never   Substance Use Topics    Alcohol use: No    Drug use: No        Holdsworth, Alayna, PA-C  [phone]

## 2025-03-20 NOTE — DISCHARGE INSTRUCTIONS

## 2025-03-21 ENCOUNTER — RESULTS FOLLOW-UP (OUTPATIENT)
Dept: EMERGENCY MEDICINE | Facility: HOSPITAL | Age: 65
End: 2025-03-21

## 2025-03-22 LAB
BACTERIA UR CULT: ABNORMAL
C TRACH RRNA SPEC QL NAA+PROBE: NEGATIVE
N GONORRHOEA RRNA SPEC QL NAA+PROBE: NEGATIVE
N.GONORROHEAE, AMP RNA SOURCE: NORMAL
SPECIMEN SOURCE: NORMAL

## 2025-03-29 LAB
OHS QRS DURATION: 88 MS
OHS QTC CALCULATION: 401 MS

## (undated) DEVICE — Device

## (undated) DEVICE — SYR CNTRL MALE LL 10ML

## (undated) DEVICE — PLUG CATHETER STERILE FOLEY

## (undated) DEVICE — SOL IRR NACL .9% 3000ML

## (undated) DEVICE — GLOVE SURG ULTRA TOUCH 7

## (undated) DEVICE — SEE MEDLINE ITEM 157185

## (undated) DEVICE — TUBING ARTHRO IRR 4-LEAD

## (undated) DEVICE — ELECTRODE RESECTION BUTTON LRG

## (undated) DEVICE — SEE MEDLINE ITEM 152651

## (undated) DEVICE — ELECTRODE REM PLYHSV RETURN 9

## (undated) DEVICE — SET CYSTO IRRIGATION UNIV SPIK

## (undated) DEVICE — SEE MEDLINE ITEM 157116

## (undated) DEVICE — CATH COUDE 20F 5CC STAT LOC

## (undated) DEVICE — CATH XRAY LTX 2WAY 22 F 30CC

## (undated) DEVICE — LUBRICANT SURGILUBE 2 OZ

## (undated) DEVICE — CYLINDER 1000 ML GRADUATED

## (undated) DEVICE — CONTAINER SPECIMEN STRL 4OZ

## (undated) DEVICE — GAUZE SPONGE BULKEE 6X6.75IN

## (undated) DEVICE — WATER STERILE INJ 500ML BAG

## (undated) DEVICE — SLEEVE SCD EXPRESS CALF MEDIUM

## (undated) DEVICE — SPONGE GAUZE 16PLY 4X4

## (undated) DEVICE — ADHESIVE MASTISOL VIAL 48/BX

## (undated) DEVICE — SYR 30CC LUER LOCK

## (undated) DEVICE — SEE L#95700

## (undated) DEVICE — SYR 50ML CATH TIP

## (undated) DEVICE — SCRUB 10% POVIDONE IODINE 4OZ

## (undated) DEVICE — LOOP CUTTING RESECT 12 D 24F

## (undated) DEVICE — SPONGE SUPER KERLIX 6X6.75IN

## (undated) DEVICE — TRAY DRY SPONGE SCRUB W FOAM

## (undated) DEVICE — SOL PVP-I SCRUB 7.5% 4OZ

## (undated) DEVICE — TUBING 4-LEAD ARTHOSCOPY

## (undated) DEVICE — DRAPE MINOR PROCEDURE

## (undated) DEVICE — SOL WATER STRL IRR 1000ML

## (undated) DEVICE — SEE MEDLINE ITEM 152487

## (undated) DEVICE — GLOVE SURG ULTRA TOUCH 6

## (undated) DEVICE — DEVICE ANC SW STAT FOLEY 6-24

## (undated) DEVICE — TAPE SILK 3IN

## (undated) DEVICE — SEE MEDLINE ITEM 157117

## (undated) DEVICE — SIGMOIDOSCOPE DISP 19X25 25/BX

## (undated) DEVICE — GLOVE SURGEONS ULTRA TOUCH 6.5

## (undated) DEVICE — SYR ONLY LUER LOCK 20CC

## (undated) DEVICE — SHEET DRAPE MEDIUM

## (undated) DEVICE — SEE L#120831

## (undated) DEVICE — JELLY LUBRICATING STERILE 5 GR

## (undated) DEVICE — BAG URINARY LEG COMBO PACK STA

## (undated) DEVICE — SEE MEDLINE ITEM 146313